# Patient Record
Sex: MALE | Race: WHITE | NOT HISPANIC OR LATINO | Employment: OTHER | URBAN - METROPOLITAN AREA
[De-identification: names, ages, dates, MRNs, and addresses within clinical notes are randomized per-mention and may not be internally consistent; named-entity substitution may affect disease eponyms.]

---

## 2018-01-05 PROBLEM — K83.1 OBSTRUCTIVE JAUNDICE: Status: ACTIVE | Noted: 2018-01-05

## 2018-01-05 NOTE — PLAN OF CARE
[facility] Hospital Medicine Transfer Acceptance Note    Date of Acceptance: 1/5  [facility] Nurse: Marisa Guadarrama  Transferring Provider and Specialty: Evangelista Izaguirre  Transferring Facility/Hospital: [facility]  Reason for transfer to [facility]: EUS/ERCP  Code Status:     Accepting Physician:  Report from Transferring Physician or Mid-Level Provider/Hospital Course:  To do list upon patient arrival:      Please call extension [phone] upon patient arrival to floor for Hospital Medicine admit team assignment and for additional admit orders. If patient is coming from another [facility] facility please also call [phone] to inform the admit team/office that patient has arrived from the [facility] facility to the floor so patient can be evaluated.

## 2018-01-05 NOTE — PLAN OF CARE
Outside Transfer Acceptance Note    Transferring Physician or Mid Level Provider/Speciality: Dr Evangelista Izaguirre    Accepting Physician: Malinda Alejandra     Date of Acceptance: 01/05/2018     Code Status: Full    Transferring Facility/Hospital: [facility]     Reason for Transfer to [facility]: Obstructive Jaundice. Needs EUS/ ERCP    Report from Transferring Physician or Mid-Level provider/Hospital course: 87 M with myelodysplastic syndrome, aortic aneurysm, HTN, renal artery stenosis, CAD s/p stent placement, and gout. He was recently treated for endocarditis (unknown culture) who completed a 6 week course of antibiotics (unknown which antibiotic).     On 12/5, pt presented with abd pain and elevated Bilirubin 8 and elevated LFTs.  Thought to be possible side effect of the antibiotics and was sent home. He represented on the 19th and bilirubin has continued to trend up to 21. ERCP showed biliary stenosis that was not amenable to stenting.     Discussed with GI who recommend transfer here for ERCP / EUS    To do list upon patient arrival: Consult [facility] / GI    Please call extension [phone] upon patient arrival to floor for Hospital Medicine admit team assignment and for additional admit orders. If patient is coming from another [facility] facility please also call [phone] to inform the admit team/office that patient has arrived from the [facility] facility to the floor so patient can be evaluated.

## 2018-01-06 PROBLEM — I25.10 CAD (CORONARY ARTERY DISEASE): Status: ACTIVE | Noted: 2018-01-06

## 2018-01-06 PROBLEM — M10.9 GOUT: Chronic | Status: ACTIVE | Noted: 2018-01-06

## 2018-01-06 PROBLEM — D46.9 MDS (MYELODYSPLASTIC SYNDROME): Status: ACTIVE | Noted: 2018-01-06

## 2018-01-06 PROBLEM — N40.1 BENIGN PROSTATIC HYPERPLASIA WITH LOWER URINARY TRACT SYMPTOMS: Chronic | Status: ACTIVE | Noted: 2018-01-06

## 2018-01-06 PROBLEM — I71.9 AORTIC ANEURYSM: Status: ACTIVE | Noted: 2018-01-06

## 2018-01-06 PROBLEM — K22.4 ESOPHAGEAL DYSMOTILITIES: Status: ACTIVE | Noted: 2018-01-06

## 2018-01-06 PROBLEM — I10 ESSENTIAL HYPERTENSION: Status: ACTIVE | Noted: 2018-01-06

## 2018-01-06 RX ORDER — GLUCAGON 1 MG
1 KIT INJECTION
Status: DISCONTINUED | OUTPATIENT
Start: 2018-01-07 | End: 2018-01-12 | Stop reason: HOSPADM

## 2018-01-06 RX ORDER — IBUPROFEN 200 MG
24 TABLET ORAL
Status: DISCONTINUED | OUTPATIENT
Start: 2018-01-07 | End: 2018-01-12 | Stop reason: HOSPADM

## 2018-01-06 RX ORDER — IBUPROFEN 200 MG
16 TABLET ORAL
Status: DISCONTINUED | OUTPATIENT
Start: 2018-01-07 | End: 2018-01-12 | Stop reason: HOSPADM

## 2018-01-06 RX ORDER — INSULIN ASPART 100 [IU]/ML
0-5 INJECTION, SOLUTION INTRAVENOUS; SUBCUTANEOUS
Status: DISCONTINUED | OUTPATIENT
Start: 2018-01-07 | End: 2018-01-12 | Stop reason: HOSPADM

## 2018-01-06 RX ORDER — SODIUM CHLORIDE 0.9 % (FLUSH) 0.9 %
5 SYRINGE (ML) INJECTION
Status: DISCONTINUED | OUTPATIENT
Start: 2018-01-07 | End: 2018-01-12 | Stop reason: HOSPADM

## 2018-01-07 ENCOUNTER — HOSPITAL ENCOUNTER (INPATIENT)
Facility: HOSPITAL | Age: 83
LOS: 5 days | Discharge: HOME HEALTH | DRG: 374 | End: 2018-01-12
Attending: HOSPITALIST | Admitting: HOSPITALIST
Payer: MEDICARE

## 2018-01-07 DIAGNOSIS — Z95.0 PACEMAKER: ICD-10-CM

## 2018-01-07 DIAGNOSIS — K31.5 DUODENAL STRICTURE: Primary | ICD-10-CM

## 2018-01-07 DIAGNOSIS — B95.8 BACTERIAL INFECTION DUE TO STAPHYLOCOCCUS: ICD-10-CM

## 2018-01-07 DIAGNOSIS — K92.0 HEMATEMESIS WITH NAUSEA: ICD-10-CM

## 2018-01-07 DIAGNOSIS — K83.1 OBSTRUCTIVE JAUNDICE: ICD-10-CM

## 2018-01-07 DIAGNOSIS — D46.9 MDS (MYELODYSPLASTIC SYNDROME): ICD-10-CM

## 2018-01-07 PROBLEM — R78.81 BACTEREMIA DUE TO STAPHYLOCOCCUS: Status: ACTIVE | Noted: 2018-01-07

## 2018-01-07 LAB
ALBUMIN SERPL BCP-MCNC: 2.2 G/DL
ALBUMIN SERPL BCP-MCNC: 2.3 G/DL
ALP SERPL-CCNC: 405 U/L
ALP SERPL-CCNC: 450 U/L
ALT SERPL W/O P-5'-P-CCNC: 57 U/L
ALT SERPL W/O P-5'-P-CCNC: 66 U/L
ANION GAP SERPL CALC-SCNC: 10 MMOL/L
ANION GAP SERPL CALC-SCNC: 8 MMOL/L
ANISOCYTOSIS BLD QL SMEAR: SLIGHT
AST SERPL-CCNC: 69 U/L
AST SERPL-CCNC: 80 U/L
BASO STIPL BLD QL SMEAR: ABNORMAL
BASOPHILS # BLD AUTO: 0 K/UL
BASOPHILS # BLD AUTO: 0.02 K/UL
BASOPHILS NFR BLD: 0 %
BASOPHILS NFR BLD: 0.5 %
BILIRUB DIRECT SERPL-MCNC: >14 MG/DL
BILIRUB SERPL-MCNC: 27.7 MG/DL
BILIRUB SERPL-MCNC: 30.8 MG/DL
BILIRUB UR QL STRIP: ABNORMAL
BUN SERPL-MCNC: 19 MG/DL
BUN SERPL-MCNC: 20 MG/DL
CALCIUM SERPL-MCNC: 8.8 MG/DL
CALCIUM SERPL-MCNC: 9 MG/DL
CANCER AG19-9 SERPL-ACNC: ABNORMAL U/ML
CHLORIDE SERPL-SCNC: 108 MMOL/L
CHLORIDE SERPL-SCNC: 109 MMOL/L
CK SERPL-CCNC: 24 U/L
CLARITY UR REFRACT.AUTO: ABNORMAL
CO2 SERPL-SCNC: 23 MMOL/L
CO2 SERPL-SCNC: 24 MMOL/L
COLOR UR AUTO: ABNORMAL
CREAT SERPL-MCNC: 1.4 MG/DL
CREAT SERPL-MCNC: 1.5 MG/DL
DIFFERENTIAL METHOD: ABNORMAL
DIFFERENTIAL METHOD: ABNORMAL
EOSINOPHIL # BLD AUTO: 0 K/UL
EOSINOPHIL # BLD AUTO: 0 K/UL
EOSINOPHIL NFR BLD: 0.8 %
EOSINOPHIL NFR BLD: 0.9 %
ERYTHROCYTE [DISTWIDTH] IN BLOOD BY AUTOMATED COUNT: 22.5 %
ERYTHROCYTE [DISTWIDTH] IN BLOOD BY AUTOMATED COUNT: 22.5 %
EST. GFR  (AFRICAN AMERICAN): 47.7 ML/MIN/1.73 M^2
EST. GFR  (AFRICAN AMERICAN): 51.9 ML/MIN/1.73 M^2
EST. GFR  (NON AFRICAN AMERICAN): 41.3 ML/MIN/1.73 M^2
EST. GFR  (NON AFRICAN AMERICAN): 44.9 ML/MIN/1.73 M^2
ESTIMATED AVG GLUCOSE: ABNORMAL MG/DL
GGT SERPL-CCNC: 625 U/L
GLUCOSE SERPL-MCNC: 115 MG/DL
GLUCOSE SERPL-MCNC: 95 MG/DL
GLUCOSE UR QL STRIP: NEGATIVE
HBA1C MFR BLD HPLC: <4 %
HCT VFR BLD AUTO: 22.4 %
HCT VFR BLD AUTO: 24.4 %
HGB BLD-MCNC: 7.3 G/DL
HGB BLD-MCNC: 8 G/DL
HGB UR QL STRIP: NEGATIVE
HYPOCHROMIA BLD QL SMEAR: ABNORMAL
IMM GRANULOCYTES # BLD AUTO: 0.13 K/UL
IMM GRANULOCYTES # BLD AUTO: 0.14 K/UL
IMM GRANULOCYTES NFR BLD AUTO: 3.8 %
IMM GRANULOCYTES NFR BLD AUTO: 4 %
INR PPP: 1.2
KETONES UR QL STRIP: NEGATIVE
LEUKOCYTE ESTERASE UR QL STRIP: NEGATIVE
LIPASE SERPL-CCNC: 7 U/L
LYMPHOCYTES # BLD AUTO: 0.9 K/UL
LYMPHOCYTES # BLD AUTO: 1 K/UL
LYMPHOCYTES NFR BLD: 23.4 %
LYMPHOCYTES NFR BLD: 29.1 %
MAGNESIUM SERPL-MCNC: 1.6 MG/DL
MCH RBC QN AUTO: 31.6 PG
MCH RBC QN AUTO: 31.6 PG
MCHC RBC AUTO-ENTMCNC: 32.6 G/DL
MCHC RBC AUTO-ENTMCNC: 32.8 G/DL
MCV RBC AUTO: 96 FL
MCV RBC AUTO: 97 FL
MONOCYTES # BLD AUTO: 0.4 K/UL
MONOCYTES # BLD AUTO: 0.4 K/UL
MONOCYTES NFR BLD: 10.5 %
MONOCYTES NFR BLD: 11.9 %
NEUTROPHILS # BLD AUTO: 1.8 K/UL
NEUTROPHILS # BLD AUTO: 2.3 K/UL
NEUTROPHILS NFR BLD: 54.1 %
NEUTROPHILS NFR BLD: 61 %
NITRITE UR QL STRIP: NEGATIVE
NRBC BLD-RTO: 0 /100 WBC
NRBC BLD-RTO: 0 /100 WBC
PH UR STRIP: 5 [PH] (ref 5–8)
PHOSPHATE SERPL-MCNC: 2 MG/DL
PHOSPHATE SERPL-MCNC: 2 MG/DL
PLATELET # BLD AUTO: 102 K/UL
PLATELET # BLD AUTO: 117 K/UL
PLATELET BLD QL SMEAR: ABNORMAL
PMV BLD AUTO: 11.1 FL
PMV BLD AUTO: 11.4 FL
POLYCHROMASIA BLD QL SMEAR: ABNORMAL
POTASSIUM SERPL-SCNC: 3.4 MMOL/L
POTASSIUM SERPL-SCNC: 3.5 MMOL/L
PROT SERPL-MCNC: 5.5 G/DL
PROT SERPL-MCNC: 5.9 G/DL
PROT UR QL STRIP: NEGATIVE
PROTHROMBIN TIME: 12.4 SEC
RBC # BLD AUTO: 2.31 M/UL
RBC # BLD AUTO: 2.53 M/UL
SODIUM SERPL-SCNC: 141 MMOL/L
SODIUM SERPL-SCNC: 141 MMOL/L
SP GR UR STRIP: 1.02 (ref 1–1.03)
URN SPEC COLLECT METH UR: ABNORMAL
UROBILINOGEN UR STRIP-ACNC: 1 EU/DL
WBC # BLD AUTO: 3.27 K/UL
WBC # BLD AUTO: 3.72 K/UL

## 2018-01-07 PROCEDURE — 83690 ASSAY OF LIPASE: CPT

## 2018-01-07 PROCEDURE — 63600175 PHARM REV CODE 636 W HCPCS: Performed by: HOSPITALIST

## 2018-01-07 PROCEDURE — 99223 1ST HOSP IP/OBS HIGH 75: CPT | Mod: GC,,, | Performed by: INTERNAL MEDICINE

## 2018-01-07 PROCEDURE — 84100 ASSAY OF PHOSPHORUS: CPT | Mod: 91

## 2018-01-07 PROCEDURE — 83036 HEMOGLOBIN GLYCOSYLATED A1C: CPT

## 2018-01-07 PROCEDURE — 87040 BLOOD CULTURE FOR BACTERIA: CPT | Mod: 59

## 2018-01-07 PROCEDURE — 85025 COMPLETE CBC W/AUTO DIFF WBC: CPT | Mod: 91

## 2018-01-07 PROCEDURE — 99223 1ST HOSP IP/OBS HIGH 75: CPT | Mod: ,,, | Performed by: INTERNAL MEDICINE

## 2018-01-07 PROCEDURE — 92610 EVALUATE SWALLOWING FUNCTION: CPT

## 2018-01-07 PROCEDURE — 99222 1ST HOSP IP/OBS MODERATE 55: CPT | Mod: GC,,, | Performed by: INTERNAL MEDICINE

## 2018-01-07 PROCEDURE — 80053 COMPREHEN METABOLIC PANEL: CPT | Mod: 91

## 2018-01-07 PROCEDURE — 20600001 HC STEP DOWN PRIVATE ROOM

## 2018-01-07 PROCEDURE — 86301 IMMUNOASSAY TUMOR CA 19-9: CPT

## 2018-01-07 PROCEDURE — 81003 URINALYSIS AUTO W/O SCOPE: CPT

## 2018-01-07 PROCEDURE — 36415 COLL VENOUS BLD VENIPUNCTURE: CPT

## 2018-01-07 PROCEDURE — 85610 PROTHROMBIN TIME: CPT

## 2018-01-07 PROCEDURE — 82248 BILIRUBIN DIRECT: CPT

## 2018-01-07 PROCEDURE — 84100 ASSAY OF PHOSPHORUS: CPT

## 2018-01-07 PROCEDURE — 82977 ASSAY OF GGT: CPT

## 2018-01-07 PROCEDURE — 99223 1ST HOSP IP/OBS HIGH 75: CPT | Mod: AI,GC,, | Performed by: HOSPITALIST

## 2018-01-07 PROCEDURE — 93010 ELECTROCARDIOGRAM REPORT: CPT | Mod: ,,, | Performed by: INTERNAL MEDICINE

## 2018-01-07 PROCEDURE — 80053 COMPREHEN METABOLIC PANEL: CPT

## 2018-01-07 PROCEDURE — 25000003 PHARM REV CODE 250: Performed by: HOSPITALIST

## 2018-01-07 PROCEDURE — 63600175 PHARM REV CODE 636 W HCPCS: Mod: JG | Performed by: STUDENT IN AN ORGANIZED HEALTH CARE EDUCATION/TRAINING PROGRAM

## 2018-01-07 PROCEDURE — 25000003 PHARM REV CODE 250: Performed by: STUDENT IN AN ORGANIZED HEALTH CARE EDUCATION/TRAINING PROGRAM

## 2018-01-07 PROCEDURE — 83735 ASSAY OF MAGNESIUM: CPT

## 2018-01-07 PROCEDURE — 82550 ASSAY OF CK (CPK): CPT

## 2018-01-07 RX ORDER — TAMSULOSIN HYDROCHLORIDE 0.4 MG/1
0.4 CAPSULE ORAL DAILY
Status: DISCONTINUED | OUTPATIENT
Start: 2018-01-07 | End: 2018-01-08

## 2018-01-07 RX ORDER — MAGNESIUM SULFATE HEPTAHYDRATE 40 MG/ML
2 INJECTION, SOLUTION INTRAVENOUS ONCE
Status: COMPLETED | OUTPATIENT
Start: 2018-01-07 | End: 2018-01-07

## 2018-01-07 RX ORDER — SOTALOL HYDROCHLORIDE 80 MG/1
40 TABLET ORAL 2 TIMES DAILY
Status: DISCONTINUED | OUTPATIENT
Start: 2018-01-07 | End: 2018-01-08

## 2018-01-07 RX ORDER — PRAVASTATIN SODIUM 20 MG/1
20 TABLET ORAL DAILY
Status: DISCONTINUED | OUTPATIENT
Start: 2018-01-07 | End: 2018-01-07

## 2018-01-07 RX ORDER — AMLODIPINE BESYLATE 5 MG/1
5 TABLET ORAL DAILY
Status: DISCONTINUED | OUTPATIENT
Start: 2018-01-07 | End: 2018-01-12 | Stop reason: HOSPADM

## 2018-01-07 RX ORDER — MONTELUKAST SODIUM 10 MG/1
10 TABLET ORAL DAILY
Status: DISCONTINUED | OUTPATIENT
Start: 2018-01-07 | End: 2018-01-12 | Stop reason: HOSPADM

## 2018-01-07 RX ORDER — RAMELTEON 8 MG/1
8 TABLET ORAL NIGHTLY PRN
Status: DISCONTINUED | OUTPATIENT
Start: 2018-01-07 | End: 2018-01-12 | Stop reason: HOSPADM

## 2018-01-07 RX ORDER — POLYETHYLENE GLYCOL 3350 17 G/17G
17 POWDER, FOR SOLUTION ORAL DAILY PRN
Status: DISCONTINUED | OUTPATIENT
Start: 2018-01-07 | End: 2018-01-07

## 2018-01-07 RX ORDER — POTASSIUM CHLORIDE 750 MG/1
40 CAPSULE, EXTENDED RELEASE ORAL ONCE
Status: DISCONTINUED | OUTPATIENT
Start: 2018-01-07 | End: 2018-01-07

## 2018-01-07 RX ORDER — NAPROXEN SODIUM 220 MG/1
81 TABLET, FILM COATED ORAL DAILY
Status: ON HOLD | COMMUNITY
End: 2018-01-11

## 2018-01-07 RX ORDER — POTASSIUM CHLORIDE 20 MEQ/1
20 TABLET, EXTENDED RELEASE ORAL ONCE
Status: COMPLETED | OUTPATIENT
Start: 2018-01-07 | End: 2018-01-07

## 2018-01-07 RX ORDER — POLYETHYLENE GLYCOL 3350 17 G/17G
17 POWDER, FOR SOLUTION ORAL DAILY
Status: DISCONTINUED | OUTPATIENT
Start: 2018-01-07 | End: 2018-01-12 | Stop reason: HOSPADM

## 2018-01-07 RX ORDER — ASPIRIN 81 MG/1
81 TABLET ORAL DAILY
Status: DISCONTINUED | OUTPATIENT
Start: 2018-01-07 | End: 2018-01-09

## 2018-01-07 RX ORDER — PANTOPRAZOLE SODIUM 40 MG/1
40 TABLET, DELAYED RELEASE ORAL DAILY
Status: DISCONTINUED | OUTPATIENT
Start: 2018-01-07 | End: 2018-01-08

## 2018-01-07 RX ORDER — DIPHENHYDRAMINE HCL 25 MG
25 CAPSULE ORAL EVERY 6 HOURS PRN
Status: DISCONTINUED | OUTPATIENT
Start: 2018-01-07 | End: 2018-01-12 | Stop reason: HOSPADM

## 2018-01-07 RX ADMIN — POLYETHYLENE GLYCOL 3350 17 G: 17 POWDER, FOR SOLUTION ORAL at 10:01

## 2018-01-07 RX ADMIN — POTASSIUM CHLORIDE 20 MEQ: 1500 TABLET, EXTENDED RELEASE ORAL at 03:01

## 2018-01-07 RX ADMIN — TAMSULOSIN HYDROCHLORIDE 0.4 MG: 0.4 CAPSULE ORAL at 10:01

## 2018-01-07 RX ADMIN — DIPHENHYDRAMINE HYDROCHLORIDE 25 MG: 25 CAPSULE ORAL at 03:01

## 2018-01-07 RX ADMIN — DAPTOMYCIN 500 MG: 500 INJECTION, POWDER, LYOPHILIZED, FOR SOLUTION INTRAVENOUS at 02:01

## 2018-01-07 RX ADMIN — AMLODIPINE BESYLATE 5 MG: 5 TABLET ORAL at 10:01

## 2018-01-07 RX ADMIN — MONTELUKAST SODIUM 10 MG: 10 TABLET, FILM COATED ORAL at 10:01

## 2018-01-07 RX ADMIN — ASPIRIN 81 MG: 81 TABLET, COATED ORAL at 10:01

## 2018-01-07 RX ADMIN — PANTOPRAZOLE SODIUM 40 MG: 40 TABLET, DELAYED RELEASE ORAL at 10:01

## 2018-01-07 RX ADMIN — SOTALOL HYDROCHLORIDE 40 MG: 80 TABLET ORAL at 09:01

## 2018-01-07 RX ADMIN — MAGNESIUM SULFATE IN WATER 2 G: 40 INJECTION, SOLUTION INTRAVENOUS at 10:01

## 2018-01-07 NOTE — HPI
87 year old male with a history of HTN and MDS on who [facility] is being consulted for obstructive jaundice.    History provided by patient and family.  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  Admitted now after being found to have increasing bili. He had a Ct, ERCP, and attempt PTC placement all reported below.  Now at [facility] he denies any nausea, emesis, or abdominal pain. He is tolerating liquids without an issue.    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

## 2018-01-07 NOTE — CONSULTS
[facility]  Hematology/Oncology  Consult Note    Patient Name: Hal Sultana  MRN: [id number]  Admission Date: 1/7/2018  Hospital Length of Stay: 0 days  Code Status: Full Code   Attending Provider: Levar Rosado MD  Consulting Provider: Lashawn Hoffmann MD  Primary Care Physician: Kaycee Ortega MD  Principal Problem:Obstructive jaundice    Inpatient consult to Hematology/Oncology  Consult performed by: LASHAWN HOFFMANN  Consult ordered by: KAY MCNULTY IV        Subjective:     HPI:  Hal Sultana is a  87 year old gentleman with MDS on Vidaza, aortic aneurysm, stable CAD, esophageal dysmotility, gout, hypertension.  He is a transfer from [facility] [place]. He was accepted for transfer by Dr. Sanchez from [facility] for possible intervention for obstructive jaundice.  He was noted in outpatient workup with infectious disease to have significant jaundice with T. Bili of 19.   He has overall had 2 admissions, the first of which determined to have bacteremia, of which he is currently being treated.  In addition, he had an MRCP performed, which was inconclusive 2/2 motion artifact.  EGD also performed, where there was no evidence of malignancy.  He went to ID follow up with noted worsening hyperbilirubinemia, prompting further work-up.  CT abdomen on 1/3/2018 showed interval development of intrahepatic biliary ductal dilation with continued dilatation of CBD, no pancreas lesion. On 1/3/2018 he underwent ERCP which showed tight duodenal stricture in the post bulbar area with inability to get the scope to pass the stricture with concern this could be a malignant duodenal lesion. On 1/5/2018, he underwent percutaneous biliary drain despite multiple phases, and possibly due to intrahepatic duct dilation made the procedure difficult.     He was diagnosed with MDS 18 months ago, primarily when his cardiologist noted his anemia.  He then seen Dr. Carrasco in [facility] for further evaluation,  where he notes to have had a bone marrow biopsy, confirming MDS.  He was then started on Vidaza (5 days every month).  He would note that he would develop fevers every 2 weeks out with a maximum temperature of 101, of which would last for approximately 5 days; the fevers were thought to have been 2/2 Vidaza.  He also notes weight loss starting with therapy, where he initially weighed 175 lbs, and now has dropped to 140 lbs.  Interestingly, most of his weight loss and his associated generalized fatigue began approximately 1 month ago, in addition to his jaundice.  He is a former smoker, quit 40 years ago, 1 PPD.  Worked as an .  Prior to hospitalization, he was able to complete all his ADLs.  Currently spends > 50 % of time out of bed.      Oncology Treatment Plan:   [No treatment plan]    Medications:  Continuous Infusions:  Scheduled Meds:   amLODIPine  5 mg Oral Daily    aspirin  81 mg Oral Daily    DAPTOmycin (CUBICIN)  IV  500 mg Intravenous Q24H    magnesium sulfate IVPB  2 g Intravenous Once    montelukast  10 mg Oral Daily    pantoprazole  40 mg Oral Daily    polyethylene glycol  17 g Oral Daily    potassium chloride  40 mEq Oral Once    pravastatin  20 mg Oral Daily    sotalol  40 mg Oral BID    tamsulosin  0.4 mg Oral Daily     PRN Meds:dextrose 50%, dextrose 50%, diphenhydrAMINE, diphenhydrAMINE-zinc acetate 1-0.1%, glucagon (human recombinant), glucose, glucose, insulin aspart, sodium chloride 0.9%     Review of patient's allergies indicates:   Allergen Reactions    Avelox [moxifloxacin]     Darvocet a500 [propoxyphene n-acetaminophen]     Ibudone [hydrocodone-ibuprofen]     Levaquin [levofloxacin]         Past Medical History:   Diagnosis Date    AAA (abdominal aortic aneurysm)     BPH (benign prostatic hyperplasia)     Duodenal stricture     Hypertension      Past Surgical History:   Procedure Laterality Date    ABDOMINAL AORTIC ANEURYSM REPAIR      CORONARY  ARTERY BYPASS GRAFT      ERCP      ESOPHAGOGASTRODUODENOSCOPY  12/13/2017     Family History     Problem Relation (Age of Onset)    No Known Problems Mother, Father        Social History Main Topics    Smoking status: Current Every Day Smoker     Types: Cigarettes    Smokeless tobacco: Not on file    Alcohol use No    Drug use: No    Sexual activity: Not Currently       Review of Systems   Constitutional: Positive for unexpected weight change. Negative for activity change, appetite change, chills, diaphoresis, fatigue and fever.   HENT: Negative for dental problem and drooling.    Respiratory: Negative for cough, choking, shortness of breath, wheezing and stridor.    Cardiovascular: Negative for chest pain, palpitations and leg swelling.   Gastrointestinal: Negative for abdominal distention, abdominal pain, constipation and diarrhea.   Genitourinary: Negative for difficulty urinating, dysuria and hematuria.   Musculoskeletal: Negative for arthralgias and back pain.   Neurological: Negative for weakness.   Psychiatric/Behavioral: Negative for agitation and behavioral problems.     Objective:     Vital Signs (Most Recent):  Temp: 98.2 °F (36.8 °C) (01/07/18 0752)  Pulse: 74 (01/07/18 0752)  Resp: 18 (01/07/18 0752)  BP: 134/65 (01/07/18 0752)  SpO2: 100 % (01/07/18 0752) Vital Signs (24h Range):  Temp:  [97.6 °F (36.4 °C)-98.2 °F (36.8 °C)] 98.2 °F (36.8 °C)  Pulse:  [70-75] 74  Resp:  [18-20] 18  SpO2:  [97 %-100 %] 100 %  BP: (108-135)/(53-65) 134/65     Weight: 67.9 kg (149 lb 11.1 oz)  Body mass index is 22.11 kg/m².  Body surface area is 1.82 meters squared.      Intake/Output Summary (Last 24 hours) at 01/07/18 0950  Last data filed at 01/07/18 0426   Gross per 24 hour   Intake              170 ml   Output              100 ml   Net               70 ml       Physical Exam   Constitutional: He is oriented to person, place, and time. He appears well-developed and well-nourished. No distress.   Jaundice     HENT:   Head: Normocephalic and atraumatic.   Mouth/Throat: No oropharyngeal exudate.   Eyes: Scleral icterus is present.   Cardiovascular: Normal rate and regular rhythm.  Exam reveals no friction rub.    3/6 systolic murmur appreciated, radiating to R carotid.   Pulmonary/Chest: Effort normal and breath sounds normal. No respiratory distress. He has no wheezes.   Abdominal: Soft. Bowel sounds are normal. He exhibits distension. There is no tenderness. There is no guarding.   Musculoskeletal: Normal range of motion. He exhibits no edema or deformity.   Neurological: He is alert and oriented to person, place, and time. No cranial nerve deficit. Coordination normal.   Skin: He is not diaphoretic. There is pallor.   Vitals reviewed.      Significant Labs:     Recent Results (from the past 24 hour(s))   Hemoglobin A1c    Collection Time: 01/07/18 12:53 AM   Result Value Ref Range    Hemoglobin A1C <4.0 (A) 4.0 - 5.6 %    Estimated Avg Glucose Unable to calculate 68 - 131 mg/dL   Comprehensive Metabolic Panel (CMP)    Collection Time: 01/07/18 12:53 AM   Result Value Ref Range    Sodium 141 136 - 145 mmol/L    Potassium 3.5 3.5 - 5.1 mmol/L    Chloride 108 95 - 110 mmol/L    CO2 23 23 - 29 mmol/L    Glucose 115 (H) 70 - 110 mg/dL    BUN, Bld 19 8 - 23 mg/dL    Creatinine 1.5 (H) 0.5 - 1.4 mg/dL    Calcium 9.0 8.7 - 10.5 mg/dL    Total Protein 5.9 (L) 6.0 - 8.4 g/dL    Albumin 2.3 (L) 3.5 - 5.2 g/dL    Total Bilirubin 30.8 (H) 0.1 - 1.0 mg/dL    Alkaline Phosphatase 450 (H) 55 - 135 U/L    AST 80 (H) 10 - 40 U/L    ALT 66 (H) 10 - 44 U/L    Anion Gap 10 8 - 16 mmol/L    eGFR if African American 47.7 (A) >60 mL/min/1.73 m^2    eGFR if non  41.3 (A) >60 mL/min/1.73 m^2   Phosphorus    Collection Time: 01/07/18 12:53 AM   Result Value Ref Range    Phosphorus 2.0 (L) 2.7 - 4.5 mg/dL   Bilirubin, direct    Collection Time: 01/07/18 12:53 AM   Result Value Ref Range    Bilirubin, Direct >14.0 (H) 0.1 - 0.3  mg/dL   Lipase    Collection Time: 01/07/18 12:53 AM   Result Value Ref Range    Lipase 7 4 - 60 U/L   Gamma GT    Collection Time: 01/07/18 12:53 AM   Result Value Ref Range     (H) 8 - 55 U/L   CBC with Automated Differential    Collection Time: 01/07/18 12:53 AM   Result Value Ref Range    WBC 3.72 (L) 3.90 - 12.70 K/uL    RBC 2.53 (L) 4.60 - 6.20 M/uL    Hemoglobin 8.0 (L) 14.0 - 18.0 g/dL    Hematocrit 24.4 (L) 40.0 - 54.0 %    MCV 96 82 - 98 fL    MCH 31.6 (H) 27.0 - 31.0 pg    MCHC 32.8 32.0 - 36.0 g/dL    RDW 22.5 (H) 11.5 - 14.5 %    Platelets 117 (L) 150 - 350 K/uL    MPV 11.1 9.2 - 12.9 fL    Immature Granulocytes 3.8 (H) 0.0 - 0.5 %    Gran # 2.3 1.8 - 7.7 K/uL    Immature Grans (Abs) 0.14 (H) 0.00 - 0.04 K/uL    Lymph # 0.9 (L) 1.0 - 4.8 K/uL    Mono # 0.4 0.3 - 1.0 K/uL    Eos # 0.0 0.0 - 0.5 K/uL    Baso # 0.02 0.00 - 0.20 K/uL    nRBC 0 0 /100 WBC    Gran% 61.0 38.0 - 73.0 %    Lymph% 23.4 18.0 - 48.0 %    Mono% 10.5 4.0 - 15.0 %    Eosinophil% 0.8 0.0 - 8.0 %    Basophil% 0.5 0.0 - 1.9 %    Platelet Estimate Decreased (A)     Aniso Slight     Poly Occasional     Hypo Occasional     Basophilic Stippling Occasional     Differential Method Automated    Cancer antigen 19-9    Collection Time: 01/07/18 12:53 AM   Result Value Ref Range    CA 19-9 >87749.0 (H) 2.0 - 40.0 U/mL   CK    Collection Time: 01/07/18 12:53 AM   Result Value Ref Range    CPK 24 20 - 200 U/L   Comprehensive Metabolic Panel (CMP)    Collection Time: 01/07/18  4:21 AM   Result Value Ref Range    Sodium 141 136 - 145 mmol/L    Potassium 3.4 (L) 3.5 - 5.1 mmol/L    Chloride 109 95 - 110 mmol/L    CO2 24 23 - 29 mmol/L    Glucose 95 70 - 110 mg/dL    BUN, Bld 20 8 - 23 mg/dL    Creatinine 1.4 0.5 - 1.4 mg/dL    Calcium 8.8 8.7 - 10.5 mg/dL    Total Protein 5.5 (L) 6.0 - 8.4 g/dL    Albumin 2.2 (L) 3.5 - 5.2 g/dL    Total Bilirubin 27.7 (H) 0.1 - 1.0 mg/dL    Alkaline Phosphatase 405 (H) 55 - 135 U/L    AST 69 (H) 10 - 40 U/L     ALT 57 (H) 10 - 44 U/L    Anion Gap 8 8 - 16 mmol/L    eGFR if African American 51.9 (A) >60 mL/min/1.73 m^2    eGFR if non African American 44.9 (A) >60 mL/min/1.73 m^2   Magnesium    Collection Time: 01/07/18  4:21 AM   Result Value Ref Range    Magnesium 1.6 1.6 - 2.6 mg/dL   Phosphorus    Collection Time: 01/07/18  4:21 AM   Result Value Ref Range    Phosphorus 2.0 (L) 2.7 - 4.5 mg/dL   CBC with Automated Differential    Collection Time: 01/07/18  4:21 AM   Result Value Ref Range    WBC 3.27 (L) 3.90 - 12.70 K/uL    RBC 2.31 (L) 4.60 - 6.20 M/uL    Hemoglobin 7.3 (L) 14.0 - 18.0 g/dL    Hematocrit 22.4 (L) 40.0 - 54.0 %    MCV 97 82 - 98 fL    MCH 31.6 (H) 27.0 - 31.0 pg    MCHC 32.6 32.0 - 36.0 g/dL    RDW 22.5 (H) 11.5 - 14.5 %    Platelets 102 (L) 150 - 350 K/uL    MPV 11.4 9.2 - 12.9 fL    Immature Granulocytes 4.0 (H) 0.0 - 0.5 %    Gran # 1.8 1.8 - 7.7 K/uL    Immature Grans (Abs) 0.13 (H) 0.00 - 0.04 K/uL    Lymph # 1.0 1.0 - 4.8 K/uL    Mono # 0.4 0.3 - 1.0 K/uL    Eos # 0.0 0.0 - 0.5 K/uL    Baso # 0.00 0.00 - 0.20 K/uL    nRBC 0 0 /100 WBC    Gran% 54.1 38.0 - 73.0 %    Lymph% 29.1 18.0 - 48.0 %    Mono% 11.9 4.0 - 15.0 %    Eosinophil% 0.9 0.0 - 8.0 %    Basophil% 0.0 0.0 - 1.9 %    Differential Method Automated    Urinalysis    Collection Time: 01/07/18  4:26 AM   Result Value Ref Range    Specimen UA Urine, Clean Catch     Color, UA Samira Yellow, Straw, Samira    Appearance, UA Hazy (A) Clear    pH, UA 5.0 5.0 - 8.0    Specific Gravity, UA 1.025 1.005 - 1.030    Protein, UA Negative Negative    Glucose, UA Negative Negative    Ketones, UA Negative Negative    Bilirubin (UA) 2+ (A) Negative    Occult Blood UA Negative Negative    Nitrite, UA Negative Negative    Urobilinogen, UA 1.0 <2.0 EU/dL    Leukocytes, UA Negative Negative         Diagnostic Results:  I have reviewed all pertinent imaging results/findings within the past 24 hours.     CXR 1/7/2018    Right-sided PICC line with tip projecting  over the SVC.  Pacemaker with cardiac leads projecting over the right atrial appendage and right ventricle.  Surgical clips project over the cardiac silhouette in keeping with previous CABG.  Additional surgical clips project over the right apical.     Mediastinal structures are midline the cardiac silhouette is normal in size.  Lung volumes are symmetric.  No focal consolidation.  No pneumothorax or pleural effusions.  No free air beneath the diaphragm.  Degenerative changes of the spine and shoulders noted.  There is scattered gas within slightly prominent loops of colon within the upper abdomen.    Assessment/Plan:     * Obstructive jaundice    Patient transferred for [facility] evaluation for biliary stricture concerning for malignancy.  Overall, patient with high likelihood for malignancy given symptoms.  Overall, agree with [facility] consult for biopsy.  Please contact Hematology/Oncology consult service after pathology has been obtained.  Uncertain at this time if patient would be candidate for further treatment, given advanced age.  ECOG score of 2.  Would also recommend:    · Ca 19-9    For further evaluation.         MDS (myelodysplastic syndrome)    Hal Sultana is a 87 y.o. gentleman with recently diagnosed MDS (18 months ago) who presents for evaluation of painless obstructive jaundice.  Overall, while we do not have work-up, we do know the patient is on Vidaza for treatment.  As of now, we would agree to continue holding medication in setting of additional malignancy work-up.  Transfuse as needed per normal guidelines.            Staff attestation to follow.    Thank you for your consult. I will follow-up with patient. Please contact us if you have any additional questions.    Junior Hoffmann MD  Hematology/Oncology  [facility]    Attending Addendum:  The patient was seen, examined, and discussed on rounds with the team.  I agree with the assessment and plan as outlined for Hal Sultana.   We'll await findings of EUS and ERCP.  We'll continue to hold dacogen.  Transfuse if clinically indicated.    Leon Haile DO, FACP  Hematology & Oncology  [facility] [place] [address]  ph. [phone]  Fax. [phone]

## 2018-01-07 NOTE — SUBJECTIVE & OBJECTIVE
Past Medical History:   Diagnosis Date    AAA (abdominal aortic aneurysm)     BPH (benign prostatic hyperplasia)     Duodenal stricture     Hypertension        Past Surgical History:   Procedure Laterality Date    ABDOMINAL AORTIC ANEURYSM REPAIR      CORONARY ARTERY BYPASS GRAFT      ERCP      ESOPHAGOGASTRODUODENOSCOPY  12/13/2017       Review of patient's allergies indicates:   Allergen Reactions    Avelox [moxifloxacin]     Darvocet a500 [propoxyphene n-acetaminophen]     Ibudone [hydrocodone-ibuprofen]     Levaquin [levofloxacin]        No current facility-administered medications on file prior to encounter.      No current outpatient prescriptions on file prior to encounter.     Family History     None        Social History Main Topics    Smoking status: Current Every Day Smoker     Types: Cigarettes    Smokeless tobacco: Not on file    Alcohol use No    Drug use: No    Sexual activity: Not Currently     Review of Systems   Constitutional: Negative for activity change, appetite change, chills, diaphoresis, fatigue and fever.   HENT: Negative for dental problem and drooling.    Respiratory: Negative for cough, choking, shortness of breath, wheezing and stridor.    Cardiovascular: Negative for chest pain, palpitations and leg swelling.   Gastrointestinal: Negative for abdominal distention, abdominal pain, constipation and diarrhea.   Genitourinary: Negative for difficulty urinating, dysuria and hematuria.   Musculoskeletal: Negative for arthralgias and back pain.   Neurological: Negative for weakness.   Psychiatric/Behavioral: Negative for agitation and behavioral problems.     Objective:     Vital Signs (Most Recent):  Temp: 97.6 °F (36.4 °C) (01/06/18 2320)  Pulse: 75 (01/06/18 2320)  Resp: 20 (01/06/18 2320)  BP: 135/64 (01/06/18 2320)  SpO2: 97 % (01/06/18 2320) Vital Signs (24h Range):  Temp:  [97.6 °F (36.4 °C)] 97.6 °F (36.4 °C)  Pulse:  [75] 75  Resp:  [20] 20  SpO2:  [97 %] 97 %  BP:  (135)/(64) 135/64     Weight: 67.9 kg (149 lb 11.1 oz)  Body mass index is 22.11 kg/m².    Physical Exam   Constitutional: He is oriented to person, place, and time. He appears well-developed and well-nourished. No distress.   HENT:   Head: Normocephalic and atraumatic.   Mouth/Throat: No oropharyngeal exudate.   Cardiovascular: Normal rate and regular rhythm.  Exam reveals no friction rub.    No murmur heard.  Pulmonary/Chest: Effort normal and breath sounds normal. No respiratory distress. He has no wheezes.   Abdominal: Soft. Bowel sounds are normal. He exhibits distension. There is no tenderness. There is no guarding.   Musculoskeletal: Normal range of motion. He exhibits no edema or deformity.   Neurological: He is alert and oriented to person, place, and time. No cranial nerve deficit. Coordination normal.   Skin: He is not diaphoretic. There is pallor.   Vitals reviewed.          Significant Labs: All pertinent labs within the past 24 hours have been reviewed.    Significant Imaging: I have reviewed and interpreted all pertinent imaging results/findings within the past 24 hours.

## 2018-01-07 NOTE — ASSESSMENT & PLAN NOTE
- Unclear etiology for why he had duodenal stricture, underwent biopsy showed normal finding, other etiology could be pancreatic mass compressing the duodenal   - Underwent ERCP at [facility] with no successful attempt to pass through the stricture   - Consult AES for possible intervention (duodenal stent)   - No signs of GI obstruction, no nausea or vomiting, and he is having bowel movement   - He might need CT abdomen with pancreatic protocol to assess his pancreas, defer to day team

## 2018-01-07 NOTE — ASSESSMENT & PLAN NOTE
Patient transferred for [facility] evaluation for biliary stricture concerning for malignancy.  Overall, patient with high likelihood for malignancy given symptoms.  Overall, agree with [facility] consult for biopsy.  Please contact Hematology/Oncology consult service after pathology has been obtained.  Uncertain at this time if patient would be candidate for further treatment, given advanced age.  ECOG score of 2.  Would also recommend:    · Ca 19-9    For further evaluation.

## 2018-01-07 NOTE — SUBJECTIVE & OBJECTIVE
Interval History: afebrile feels overall well    Social history, fam history and surgical history reviewed with patient and his family    Review of Systems   Constitutional: Negative for activity change, appetite change, chills, fatigue and fever.   HENT: Negative for congestion, dental problem, mouth sores and sinus pressure.    Eyes: Negative for pain, redness and visual disturbance.   Respiratory: Negative for cough, shortness of breath and wheezing.    Cardiovascular: Negative for chest pain and leg swelling.   Gastrointestinal: Negative for abdominal distention, abdominal pain, diarrhea, nausea and vomiting.   Endocrine: Negative for polyuria.   Genitourinary: Negative for decreased urine volume, dysuria and frequency.   Musculoskeletal: Negative for joint swelling.   Skin: Negative for color change.   Allergic/Immunologic: Negative for food allergies.   Neurological: Negative for dizziness, weakness and headaches.   Hematological: Negative for adenopathy.   Psychiatric/Behavioral: Negative for agitation and confusion. The patient is not nervous/anxious.      Objective:     Vital Signs (Most Recent):  Temp: 98.2 °F (36.8 °C) (01/07/18 0752)  Pulse: 74 (01/07/18 0752)  Resp: 18 (01/07/18 0752)  BP: 134/65 (01/07/18 0752)  SpO2: 100 % (01/07/18 0752) Vital Signs (24h Range):  Temp:  [97.6 °F (36.4 °C)-98.2 °F (36.8 °C)] 98.2 °F (36.8 °C)  Pulse:  [70-75] 74  Resp:  [18-20] 18  SpO2:  [97 %-100 %] 100 %  BP: (108-135)/(53-65) 134/65     Weight: 67.9 kg (149 lb 11.1 oz)  Body mass index is 22.11 kg/m².    Estimated Creatinine Clearance: 35.7 mL/min (based on SCr of 1.4 mg/dL).    Physical Exam   Constitutional: He is oriented to person, place, and time. He appears well-developed and well-nourished.   HENT:   Head: Normocephalic and atraumatic.   Mouth/Throat: Oropharynx is clear and moist.   jaundiced   Eyes: Conjunctivae are normal.   Neck: Neck supple.   Cardiovascular: Normal rate, regular rhythm and normal  heart sounds.    No murmur heard.  Pulmonary/Chest: Effort normal and breath sounds normal. No respiratory distress. He has no wheezes.   Abdominal: Soft. Bowel sounds are normal. He exhibits no distension. There is no tenderness.   Musculoskeletal: Normal range of motion. He exhibits no edema or tenderness.   Lymphadenopathy:     He has no cervical adenopathy.   Neurological: He is alert and oriented to person, place, and time. Coordination normal.   Skin: Skin is warm and dry. No rash noted.   Psychiatric: He has a normal mood and affect. His behavior is normal.       Significant Labs:   CBC:   Recent Labs  Lab 01/07/18 0053 01/07/18 0421   WBC 3.72* 3.27*   HGB 8.0* 7.3*   HCT 24.4* 22.4*   * 102*     CMP:   Recent Labs  Lab 01/07/18 0053 01/07/18 0421    141   K 3.5 3.4*    109   CO2 23 24   * 95   BUN 19 20   CREATININE 1.5* 1.4   CALCIUM 9.0 8.8   PROT 5.9* 5.5*   ALBUMIN 2.3* 2.2*   BILITOT 30.8* 27.7*   ALKPHOS 450* 405*   AST 80* 69*   ALT 66* 57*   ANIONGAP 10 8   EGFRNONAA 41.3* 44.9*       Significant Imaging: I have reviewed all pertinent imaging results/findings within the past 24 hours.     OSH cultures:  12/12  Staph scuri 1/4 bottles  dapto APOLONIA 0.5  vanco 1.0  linezolid Sensitive  Vanco sensitive     1/2 sputum cx   Pseudomonas S to amkicin, cefepime, ceftaz, cipro, austin, pip/tazo, tobra, Intermediate to levo and aztreonam and gent

## 2018-01-07 NOTE — PT/OT/SLP EVAL
"Speech Language Pathology Evaluation  Bedside Swallow/Discharge Note    Patient Name:  Hal Sultana   MRN:  [id number]  Admitting Diagnosis: Obstructive jaundice    Recommendations:                 General Recommendations:  none  Diet recommendations:  Regular, Other (see comments) (when cleared by MD to begin solids), Thin   Aspiration Precautions: HOB to 90 degrees, Meds whole buried in puree, Remain upright 30 minutes post meal, Small bites/sips and Standard aspiration precautions   General Precautions: Standard,    Communication strategies:  none    History:     Past Medical History:   Diagnosis Date    AAA (abdominal aortic aneurysm)     BPH (benign prostatic hyperplasia)     Duodenal stricture     Hypertension        Past Surgical History:   Procedure Laterality Date    ABDOMINAL AORTIC ANEURYSM REPAIR      CORONARY ARTERY BYPASS GRAFT      ERCP      ESOPHAGOGASTRODUODENOSCOPY  12/13/2017       Social History: Patient lives with wife    Prior diet: regular/thin        Subjective     " I don't have any problems. They just don't let me eat solids because of the blockage"  Patient goals: to eat    Objective:     Oral Musculature Evaluation  · Oral Musculature: WFL  · Dentition: present and adequate  · Mucosal Quality: good  · Lingual Strength and Mobility: WFL  · Velar Elevation: WFL  · Buccal Strength and Mobility: WFL  · Volitional Cough: adequate  · Voice Prior to PO Intake: clear; slightly hoarse; baseline per family    Bedside Swallow Eval:   Consistencies Assessed:  · Thin liquids 2 cups via straw  · Puree teaspoon   · Solids 1 bite of cracker     Oral Phase:   · WFL    Pharyngeal Phase:   · no overt clinical signs/symptoms of pharyngeal dysphagia    Compensatory Strategies  · None    Treatment: Nursing cleared pt for solid trials. Nursing reported difficulty with pt taking pills this am. Pt expressed difficulty with large pills. Pt with bowel obstruction and on full liquid diet. Pt with no overt s/s " of aspiration following any of the consistencies. Discussed that large pills can be buried whole in pureed bolus or crushed if needed. Reviewed s/s fo aspiration and general swallowing precautions. Pt and family with good understanding. White board updated. No further speech tx recommended. Recommend regular diet when cleared for solids.     Assessment:     Hal Sultana is a 87 y.o. male with an SLP diagnosis of adequate oral pharyngeal phase of swallowing.  He presents with no overt s/s of aspiration.     Goals:    SLP Goals        Problem: SLP Goal    Goal Priority Disciplines Outcome   SLP Goal     SLP                    Plan:     · Patient to be seen:      · Plan of Care expires:     · Plan of Care reviewed with:  patient, family   · SLP Follow-Up:  No       Discharge recommendations:   (no further speech tx recommended)     Time Tracking:     SLP Treatment Date:   01/07/18  Speech Start Time:  1352  Speech Stop Time:  1405     Speech Total Time (min):  13 min    Billable Minutes: Eval Swallow and Oral Function 13    EPIFANIO Vázquez, CCC-SLP  01/07/2018

## 2018-01-07 NOTE — CONSULTS
[facility]  Infectious Disease  Consult Note    Patient Name: Hal Sultana  MRN: [id number]  Admission Date: 1/7/2018  Hospital Length of Stay: 0 days  Attending Physician: Levar Rosado MD  Primary Care Provider: Kaycee Ortega MD     Isolation Status: No active isolations        Inpatient consult to Infectious Diseases  Consult performed by: SARAH CELIS  Consult ordered by: KENNETH OROZCO  Reason for consult: h/o bacteremia  Assessment/Recommendations: Consult received, see prog note

## 2018-01-07 NOTE — PLAN OF CARE
Problem: SLP Goal  Goal: SLP Goal  Swallow eval completed. Pt with no overt s/s of aspiration with any consistency.     EPIFANIO Vázquez, CCC-SLP  1/7/2018

## 2018-01-07 NOTE — ASSESSMENT & PLAN NOTE
- Remote history of CAD and CABG  - Currently stable, no chest pain, on Aspirin and Statin, will continue

## 2018-01-07 NOTE — SUBJECTIVE & OBJECTIVE
Past Medical History:   Diagnosis Date    AAA (abdominal aortic aneurysm)     BPH (benign prostatic hyperplasia)     Duodenal stricture     Hypertension        Past Surgical History:   Procedure Laterality Date    ABDOMINAL AORTIC ANEURYSM REPAIR      CORONARY ARTERY BYPASS GRAFT      ERCP      ESOPHAGOGASTRODUODENOSCOPY  12/13/2017       Review of patient's allergies indicates:   Allergen Reactions    Avelox [moxifloxacin]     Darvocet a500 [propoxyphene n-acetaminophen]     Ibudone [hydrocodone-ibuprofen]     Levaquin [levofloxacin]      Family History     Problem Relation (Age of Onset)    No Known Problems Mother, Father        Social History Main Topics    Smoking status: Current Every Day Smoker     Types: Cigarettes    Smokeless tobacco: Not on file    Alcohol use No    Drug use: No    Sexual activity: Not Currently     Review of Systems   Constitutional: Positive for appetite change, fatigue and unexpected weight change. Negative for activity change, chills, diaphoresis and fever.   HENT: Negative for trouble swallowing.    Eyes: Negative.    Respiratory: Negative.    Cardiovascular: Negative.    Gastrointestinal: Negative for abdominal distention, abdominal pain, anal bleeding, blood in stool, constipation, diarrhea, nausea, rectal pain and vomiting.   Genitourinary: Negative.    Musculoskeletal: Negative.    Skin: Positive for color change.   Neurological: Negative.      Objective:     Vital Signs (Most Recent):  Temp: 98.2 °F (36.8 °C) (01/07/18 0752)  Pulse: 74 (01/07/18 0752)  Resp: 18 (01/07/18 0752)  BP: 134/65 (01/07/18 0752)  SpO2: 100 % (01/07/18 0752) Vital Signs (24h Range):  Temp:  [97.6 °F (36.4 °C)-98.2 °F (36.8 °C)] 98.2 °F (36.8 °C)  Pulse:  [70-75] 74  Resp:  [18-20] 18  SpO2:  [97 %-100 %] 100 %  BP: (108-135)/(53-65) 134/65     Weight: 67.9 kg (149 lb 11.1 oz) (01/06/18 2320)  Body mass index is 22.11 kg/m².      Intake/Output Summary (Last 24 hours) at 01/07/18  1156  Last data filed at 01/07/18 0426   Gross per 24 hour   Intake              170 ml   Output              100 ml   Net               70 ml       Lines/Drains/Airways     Peripherally Inserted Central Catheter Line                 PICC Double Lumen 12/31/17 right brachial 7 days                Physical Exam   Constitutional: He is oriented to person, place, and time. No distress.   Eyes: Scleral icterus is present.   Cardiovascular: Normal rate and regular rhythm.    Pulmonary/Chest: Effort normal and breath sounds normal.   Abdominal: Soft. Bowel sounds are normal. He exhibits no distension and no mass. There is no tenderness. There is no rebound and no guarding. No hernia.   Musculoskeletal: Normal range of motion. He exhibits no edema.   Neurological: He is alert and oriented to person, place, and time.   Skin: He is not diaphoretic.       Significant Labs:  CBC:   Recent Labs  Lab 01/07/18  0053 01/07/18 0421   WBC 3.72* 3.27*   HGB 8.0* 7.3*   HCT 24.4* 22.4*   * 102*     CMP:   Recent Labs  Lab 01/07/18 0421   GLU 95   CALCIUM 8.8   ALBUMIN 2.2*   PROT 5.5*      K 3.4*   CO2 24      BUN 20   CREATININE 1.4   ALKPHOS 405*   ALT 57*   AST 69*   BILITOT 27.7*     Coagulation: No results for input(s): PT, INR, APTT in the last 48 hours.    Significant Imaging:  Imaging results within the past 24 hours have been reviewed.

## 2018-01-07 NOTE — HPI
This is Mr. Hal Sultana, 87 year old male with PMHx is significant for acquired pancytopenia, aortic aneurysm, stable CAD, esophageal dysmotility, gout, hypertension. Myelodysplastic syndrome. He is a transfer from [place], [place]. He was accepted for transfer by Dr. Sanchez from [facility] for possible intervention for obstructive jaundice.     He presented to ID clinic on January 1st, 2018 with worsening jaundice and T. Bili reached 19. He was discharged from the same hospital on 12/29/2017 after admission for pruritus and jaundice, underwent MRCP was inconclusive because of motion artifact. Plan to do ERCP; however not done because of improvement in his LFTs. During his December stay, where he initially presented with abdominal bloating and weight loss, he was found to have Staphylococcus sciuri bacteremia which was treated with Doxycycline and Daptomycin (with plan to end date on 2/2018 and intention to complete the therapy on with home infusion care). He underwent EGD and biopsy on 12/13/2017 with result showed no evidence of tumor or dysplasia. He also found to have severe duodenal stricture with plan for intervention ([facility] versus surgically).    For the January 2018 admission, he underwent different investigation for his worsening LFTs. CT abdomen on 1/3/2018 showed interval development of intrahepatic biliary ductal dilation with continued dilatation of CBD, no pancreas lesion. On 1/3/2018 he underwent ERCP which showed Tight duodenal stricture in the post bulbar area with inability to get the scope to pass the stricture with concern this could be a malignant duodenal lesion. On 1/5/2018, he underwent percutaneous biliary drain despite multiple phases, and possibly due to intrahepatic duct dilation made the procedure difficult. On 1/5/2018 he underwent Based on labs for the last couple of days were T. Bili 19 and Direct Bili 14, Alk Phosph 469 ALT 52 AST 50. Renal function panel is within normal  range. I reviewed medical charts from [facility] [place] [facility] and summarize his charts.

## 2018-01-07 NOTE — CONSULTS
[facility]  Gastroenterology  Consult Note    Patient Name: Hal Sultana  MRN: [id number]  Admission Date: 1/7/2018  Hospital Length of Stay: 0 days  Code Status: Full Code   Attending Provider: Levar Rosado MD   Consulting Provider: Zoe Stevenson MD  Primary Care Physician: Kaycee Ortega MD  Principal Problem:Obstructive jaundice    Inpatient consult to [facility]  Consult performed by: ZOE STEVENSON  Consult ordered by: KENNETH OROZCO        Subjective:     HPI:  87 year old male with a history of HTN and MDS on who [facility] is being consulted for obstructive jaundice.    History provided by patient and family.  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  Admitted now after being found to have increasing bili. He had a Ct, ERCP, and attempt PTC placement all reported below.  Now [place] [facility] he denies any nausea, emesis, or abdominal pain. He is tolerating liquids without an issue.    Imaging/procedures from outside hospital:  CT abdomen/pelvis on 1/3/18  Interval development of intrahepatic biliary ductal dilation with CBD dilation  ERCP on 1/3/18  Tight duodenal stricture in the post bulbar area with inability to get the scope past the stricture  PTC on 1/5/18 attempted but unsuccessful    Past Medical History:   Diagnosis Date    AAA (abdominal aortic aneurysm)     BPH (benign prostatic hyperplasia)     Duodenal stricture     Hypertension        Past Surgical History:   Procedure Laterality Date    ABDOMINAL AORTIC ANEURYSM REPAIR      CORONARY ARTERY BYPASS GRAFT      ERCP      ESOPHAGOGASTRODUODENOSCOPY  12/13/2017       Review of patient's allergies indicates:   Allergen Reactions    Avelox [moxifloxacin]     Darvocet a500 [propoxyphene n-acetaminophen]     Ibudone [hydrocodone-ibuprofen]     Levaquin  [levofloxacin]      Family History     Problem Relation (Age of Onset)    No Known Problems Mother, Father        Social History Main Topics    Smoking status: Current Every Day Smoker     Types: Cigarettes    Smokeless tobacco: Not on file    Alcohol use No    Drug use: No    Sexual activity: Not Currently     Review of Systems   Constitutional: Positive for appetite change, fatigue and unexpected weight change. Negative for activity change, chills, diaphoresis and fever.   HENT: Negative for trouble swallowing.    Eyes: Negative.    Respiratory: Negative.    Cardiovascular: Negative.    Gastrointestinal: Negative for abdominal distention, abdominal pain, anal bleeding, blood in stool, constipation, diarrhea, nausea, rectal pain and vomiting.   Genitourinary: Negative.    Musculoskeletal: Negative.    Skin: Positive for color change.   Neurological: Negative.      Objective:     Vital Signs (Most Recent):  Temp: 98.2 °F (36.8 °C) (01/07/18 0752)  Pulse: 74 (01/07/18 0752)  Resp: 18 (01/07/18 0752)  BP: 134/65 (01/07/18 0752)  SpO2: 100 % (01/07/18 0752) Vital Signs (24h Range):  Temp:  [97.6 °F (36.4 °C)-98.2 °F (36.8 °C)] 98.2 °F (36.8 °C)  Pulse:  [70-75] 74  Resp:  [18-20] 18  SpO2:  [97 %-100 %] 100 %  BP: (108-135)/(53-65) 134/65     Weight: 67.9 kg (149 lb 11.1 oz) (01/06/18 2320)  Body mass index is 22.11 kg/m².      Intake/Output Summary (Last 24 hours) at 01/07/18 1156  Last data filed at 01/07/18 0426   Gross per 24 hour   Intake              170 ml   Output              100 ml   Net               70 ml       Lines/Drains/Airways     Peripherally Inserted Central Catheter Line                 PICC Double Lumen 12/31/17 right brachial 7 days                Physical Exam   Constitutional: He is oriented to person, place, and time. No distress.   Eyes: Scleral icterus is present.   Cardiovascular: Normal rate and regular rhythm.    Pulmonary/Chest: Effort normal and breath sounds normal.   Abdominal:  Soft. Bowel sounds are normal. He exhibits no distension and no mass. There is no tenderness. There is no rebound and no guarding. No hernia.   Musculoskeletal: Normal range of motion. He exhibits no edema.   Neurological: He is alert and oriented to person, place, and time.   Skin: He is not diaphoretic.       Significant Labs:  CBC:   Recent Labs  Lab 01/07/18  0053 01/07/18  0421   WBC 3.72* 3.27*   HGB 8.0* 7.3*   HCT 24.4* 22.4*   * 102*     CMP:   Recent Labs  Lab 01/07/18  0421   GLU 95   CALCIUM 8.8   ALBUMIN 2.2*   PROT 5.5*      K 3.4*   CO2 24      BUN 20   CREATININE 1.4   ALKPHOS 405*   ALT 57*   AST 69*   BILITOT 27.7*     Coagulation: No results for input(s): PT, INR, APTT in the last 48 hours.    Significant Imaging:  Imaging results within the past 24 hours have been reviewed.    Assessment/Plan:     * Obstructive jaundice    87 year old male with a history of HTN and MDS on who [facility] is being consulted for obstructive jaundice. His outside Ct, ERCP, and records of failed PTC drained were reviewed. At this point no need for further imaging, we will proceed with EUS +/- ERCP tomorrow to further investigate case of obstructive jaundice ([place] no large pancreatic mass, malignancy of that area remains high).    Recommendations:  -NPO after MN for EUS +/- ERCP  -Check INR today if elevated can have vitamin K IV, also check it in the AM            Thank you for your consult. I will follow-up with patient. Please contact us if you have any additional questions.    Carla Ramos M.D.  Gastroenterology Fellow, PGY-IV  Pager: [phone]  [facility]

## 2018-01-07 NOTE — ASSESSMENT & PLAN NOTE
87 year old male with a history of HTN and MDS on who [facility] is being consulted for obstructive jaundice. His outside Ct, ERCP, and records of failed PTC drained were reviewed. At this point no need for further imaging, we will proceed with EUS +/- ERCP tomorrow to further investigate case of obstructive jaundice ([place] no large pancreatic mass, malignancy of that area remains high).    Recommendations:  -NPO after MN for EUS +/- ERCP  -Check INR today if elevated can have vitamin K IV, also check it in the AM

## 2018-01-07 NOTE — ASSESSMENT & PLAN NOTE
- Underwent EGD, and no anatomic abnormalities in EGD   - Stable on Pantoprazole for symptomatic GERD

## 2018-01-07 NOTE — ASSESSMENT & PLAN NOTE
88 y/o M h/o AAA, CAD, PPM, esophageal dysmotility, gouth, HTN, MDS on azacidatine  transferred 1/7 from [facility] in [place] for higher level intervention for obstructive jaundice  and duodenal stricture. Pt recently hospitalized 12/2017 with staph sciuri bacteremia treated with daptomycin. Pt overall clinically improved.  Given patients story of intermittent fevers over past year unclear etiology but may be from MDS, cholangitic obstruction, drug fevers.  Staph grew from 1/4 bottles (i have discussed with micro lab at OSH) pt was discharged with no therapy then readmitted with fevers and reportedly admit cultures were negative. So to me it is not clear that staph sp that grew was truly pathogenic.  Pt does have PPM so may be at some risk for seeding but would have expected more prolonged period of bacteremia.  At this time would finish 4 weeks of daptomycin (finishes 1/12) and would have patient f/u with his primary ID doctor in [place].  Monitor weekly CPKs while on dapto.  And call back if admit blood cultures here are positive  - all of the above were discussed with patient and his family at length pros and cons of abx/PICC line and also potential need in future for repeat bcx if febrile given possibility of PPM seeding  - discussed with team  - will sign off please call back if questions

## 2018-01-07 NOTE — ASSESSMENT & PLAN NOTE
- On Decemebr 2017 admission found to have Staphylococcus sciuri bactremia   - Was treated with Doxycycline and Daptomycin (with plan to end date on 2/2018 and intention to complete the therapy on with home infusion care).  - ID consulted and appreciate recs  - Home infusion center called and pt was on daptomycin 430mg IV with end date scheduled for 2/7/18  - Will continue Daptomycin 500mg IV qday with weekly CPK labs

## 2018-01-07 NOTE — PROGRESS NOTES
[facility]  Infectious Disease  Progress Note    Patient Name: Hal Sultana  MRN: [id number]  Admission Date: 1/7/2018  Length of Stay: 0 days  Attending Physician: Levar Rosado MD  Primary Care Provider: Kaycee Ortega MD    Isolation Status: No active isolations  Assessment/Plan:      Bacteremia due to Staphylococcus    86 y/o M h/o AAA, CAD, PPM, esophageal dysmotility, gouth, HTN, MDS on azacidatine  transferred 1/7 from [facility] in [place] for higher level intervention for obstructive jaundice  and duodenal stricture. Pt recently hospitalized 12/2017 with staph sciuri bacteremia treated with daptomycin. Pt overall clinically improved.  Given patients story of intermittent fevers over past year unclear etiology but may be from MDS, cholangitic obstruction, drug fevers.  Staph grew from 1/4 bottles (i have discussed with micro lab at OSH) pt was discharged with no therapy then readmitted with fevers and reportedly admit cultures were negative. So to me it is not clear that staph sp that grew was truly pathogenic.  Pt does have PPM so may be at some risk for seeding but would have expected more prolonged period of bacteremia.  At this time would finish 4 weeks of daptomycin (finishes 1/12) and would have patient f/u with his primary ID doctor in [place].  Monitor weekly CPKs while on dapto.  And call back if admit blood cultures here are positive  - all of the above were discussed with patient and his family at length pros and cons of abx/PICC line and also potential need in future for repeat bcx if febrile given possibility of PPM seeding  - discussed with team  - will sign off please call back if questions            Anticipated Disposition: pending    Thank you for your consult. I will sign off. Please contact us if you have any additional questions.    Best Naqvi MD  Infectious Disease  [facility]    Subjective:     Principal Problem:Obstructive  jaundice    HPI: No notes on file  Interval History: afebrile feels overall well    Social history, fam history and surgical history reviewed with patient and his family    Review of Systems   Constitutional: Negative for activity change, appetite change, chills, fatigue and fever.   HENT: Negative for congestion, dental problem, mouth sores and sinus pressure.    Eyes: Negative for pain, redness and visual disturbance.   Respiratory: Negative for cough, shortness of breath and wheezing.    Cardiovascular: Negative for chest pain and leg swelling.   Gastrointestinal: Negative for abdominal distention, abdominal pain, diarrhea, nausea and vomiting.   Endocrine: Negative for polyuria.   Genitourinary: Negative for decreased urine volume, dysuria and frequency.   Musculoskeletal: Negative for joint swelling.   Skin: Negative for color change.   Allergic/Immunologic: Negative for food allergies.   Neurological: Negative for dizziness, weakness and headaches.   Hematological: Negative for adenopathy.   Psychiatric/Behavioral: Negative for agitation and confusion. The patient is not nervous/anxious.      Objective:     Vital Signs (Most Recent):  Temp: 98.2 °F (36.8 °C) (01/07/18 0752)  Pulse: 74 (01/07/18 0752)  Resp: 18 (01/07/18 0752)  BP: 134/65 (01/07/18 0752)  SpO2: 100 % (01/07/18 0752) Vital Signs (24h Range):  Temp:  [97.6 °F (36.4 °C)-98.2 °F (36.8 °C)] 98.2 °F (36.8 °C)  Pulse:  [70-75] 74  Resp:  [18-20] 18  SpO2:  [97 %-100 %] 100 %  BP: (108-135)/(53-65) 134/65     Weight: 67.9 kg (149 lb 11.1 oz)  Body mass index is 22.11 kg/m².    Estimated Creatinine Clearance: 35.7 mL/min (based on SCr of 1.4 mg/dL).    Physical Exam   Constitutional: He is oriented to person, place, and time. He appears well-developed and well-nourished.   HENT:   Head: Normocephalic and atraumatic.   Mouth/Throat: Oropharynx is clear and moist.   jaundiced   Eyes: Conjunctivae are normal.   Neck: Neck supple.   Cardiovascular: Normal  rate, regular rhythm and normal heart sounds.    No murmur heard.  Pulmonary/Chest: Effort normal and breath sounds normal. No respiratory distress. He has no wheezes.   Abdominal: Soft. Bowel sounds are normal. He exhibits no distension. There is no tenderness.   Musculoskeletal: Normal range of motion. He exhibits no edema or tenderness.   Lymphadenopathy:     He has no cervical adenopathy.   Neurological: He is alert and oriented to person, place, and time. Coordination normal.   Skin: Skin is warm and dry. No rash noted.   Psychiatric: He has a normal mood and affect. His behavior is normal.       Significant Labs:   CBC:   Recent Labs  Lab 01/07/18 0053 01/07/18 0421   WBC 3.72* 3.27*   HGB 8.0* 7.3*   HCT 24.4* 22.4*   * 102*     CMP:   Recent Labs  Lab 01/07/18 0053 01/07/18 0421    141   K 3.5 3.4*    109   CO2 23 24   * 95   BUN 19 20   CREATININE 1.5* 1.4   CALCIUM 9.0 8.8   PROT 5.9* 5.5*   ALBUMIN 2.3* 2.2*   BILITOT 30.8* 27.7*   ALKPHOS 450* 405*   AST 80* 69*   ALT 66* 57*   ANIONGAP 10 8   EGFRNONAA 41.3* 44.9*       Significant Imaging: I have reviewed all pertinent imaging results/findings within the past 24 hours.     OSH cultures:  12/12  Staph scuri 1/4 bottles  dapto APOLONIA 0.5  vanco 1.0  linezolid Sensitive  Vanco sensitive     1/2 sputum cx   Pseudomonas S to amkicin, cefepime, ceftaz, cipro, austin, pip/tazo, tobra, Intermediate to levo and aztreonam and gent

## 2018-01-07 NOTE — ASSESSMENT & PLAN NOTE
- On Amlodipine 5 mg and Sotalol 40 mg PO BID   - Stable blood pressure will continue home medications

## 2018-01-07 NOTE — HPI
Hal Sultana is a  87 year old gentleman with MDS on Vidaza, aortic aneurysm, stable CAD, esophageal dysmotility, gout, hypertension.  He is a transfer from [facility] at [place], [place]. He was accepted for transfer by Dr. Sanchez from [facility] for possible intervention for obstructive jaundice.  He was noted in outpatient workup with infectious disease to have significant jaundice with T. Bili of 19.   He has overall had 2 admissions, the first of which determined to have bacteremia, of which he is currently being treated.  In addition, he had an MRCP performed, which was inconclusive 2/2 motion artifact.  EGD also performed, where there was no evidence of malignancy.  He went to ID follow up with noted worsening hyperbilirubinemia, prompting further work-up.  CT abdomen on 1/3/2018 showed interval development of intrahepatic biliary ductal dilation with continued dilatation of CBD, no pancreas lesion. On 1/3/2018 he underwent ERCP which showed tight duodenal stricture in the post bulbar area with inability to get the scope to pass the stricture with concern this could be a malignant duodenal lesion. On 1/5/2018, he underwent percutaneous biliary drain despite multiple phases, and possibly due to intrahepatic duct dilation made the procedure difficult.     He was diagnosed with MDS 18 months ago, primarily when his cardiologist noted his anemia.  He then seen Dr. Carrasco in [facility] for further evaluation, where he notes to have had a bone marrow biopsy, confirming MDS.  He was then started on Vidaza (5 days every month).  He would note that he would develop fevers every 2 weeks out with a maximum temperature of 101, of which would last for approximately 5 days; the fevers were thought to have been 2/2 Vidaza.  He also notes weight loss starting with therapy, where he initially weighed 175 lbs, and now has dropped to 140 lbs.  Interestingly, most of his weight loss and his associated generalized fatigue began  approximately 1 month ago, in addition to his jaundice.  He is a former smoker, quit 40 years ago, 1 PPD.  Worked as an .  Prior to hospitalization, he was able to complete all his ADLs.  Currently spends > 50 % of time out of bed.

## 2018-01-07 NOTE — ASSESSMENT & PLAN NOTE
Hal Sultana is a 87 y.o. gentleman with recently diagnosed MDS (18 months ago) who presents for evaluation of painless obstructive jaundice.  Overall, while we do not have work-up, we do know the patient is on Vidaza for treatment.  As of now, we would agree to continue holding medication in setting of additional malignancy work-up.  Transfuse as needed per normal guidelines.

## 2018-01-07 NOTE — PLAN OF CARE
Problem: Patient Care Overview  Goal: Plan of Care Review  Outcome: Ongoing (interventions implemented as appropriate)  Patient transferred via ambulance from [place], [place] in stable condition.  AAOx4 VS wnl.  Complaints of mild right upper quad tenderness.  Out of bed with standby assist.  Daughter is staying in [facility] and left her phone number in a sticky note.  Consults made for infectious disease and AES for ERCP and dueduonal stent placement, pt aware of npo status.  Reports only able to tolerate clear liquids x 2 weeks and unknown amount of weight loss.  Cough present and loose sounding states that productive sputum is at times clear and frothy. Speech to eval and treat.  Has pacemaker to left upper chest and picc line to right upper arm placement verified by xray and has good blood return dressing changed.  Iv antibiotics given as ordered.  Patient jaundiced and c/o itching benadryl given po with mild relief obtained.  Bilateral hearing aids and glasses at bedside.

## 2018-01-07 NOTE — SUBJECTIVE & OBJECTIVE
Oncology Treatment Plan:   [No treatment plan]    Medications:  Continuous Infusions:  Scheduled Meds:   amLODIPine  5 mg Oral Daily    aspirin  81 mg Oral Daily    DAPTOmycin (CUBICIN)  IV  500 mg Intravenous Q24H    magnesium sulfate IVPB  2 g Intravenous Once    montelukast  10 mg Oral Daily    pantoprazole  40 mg Oral Daily    polyethylene glycol  17 g Oral Daily    potassium chloride  40 mEq Oral Once    pravastatin  20 mg Oral Daily    sotalol  40 mg Oral BID    tamsulosin  0.4 mg Oral Daily     PRN Meds:dextrose 50%, dextrose 50%, diphenhydrAMINE, diphenhydrAMINE-zinc acetate 1-0.1%, glucagon (human recombinant), glucose, glucose, insulin aspart, sodium chloride 0.9%     Review of patient's allergies indicates:   Allergen Reactions    Avelox [moxifloxacin]     Darvocet a500 [propoxyphene n-acetaminophen]     Ibudone [hydrocodone-ibuprofen]     Levaquin [levofloxacin]         Past Medical History:   Diagnosis Date    AAA (abdominal aortic aneurysm)     BPH (benign prostatic hyperplasia)     Duodenal stricture     Hypertension      Past Surgical History:   Procedure Laterality Date    ABDOMINAL AORTIC ANEURYSM REPAIR      CORONARY ARTERY BYPASS GRAFT      ERCP      ESOPHAGOGASTRODUODENOSCOPY  12/13/2017     Family History     Problem Relation (Age of Onset)    No Known Problems Mother, Father        Social History Main Topics    Smoking status: Current Every Day Smoker     Types: Cigarettes    Smokeless tobacco: Not on file    Alcohol use No    Drug use: No    Sexual activity: Not Currently       Review of Systems   Constitutional: Positive for unexpected weight change. Negative for activity change, appetite change, chills, diaphoresis, fatigue and fever.   HENT: Negative for dental problem and drooling.    Respiratory: Negative for cough, choking, shortness of breath, wheezing and stridor.    Cardiovascular: Negative for chest pain, palpitations and leg swelling.   Gastrointestinal:  Negative for abdominal distention, abdominal pain, constipation and diarrhea.   Genitourinary: Negative for difficulty urinating, dysuria and hematuria.   Musculoskeletal: Negative for arthralgias and back pain.   Neurological: Negative for weakness.   Psychiatric/Behavioral: Negative for agitation and behavioral problems.     Objective:     Vital Signs (Most Recent):  Temp: 98.2 °F (36.8 °C) (01/07/18 0752)  Pulse: 74 (01/07/18 0752)  Resp: 18 (01/07/18 0752)  BP: 134/65 (01/07/18 0752)  SpO2: 100 % (01/07/18 0752) Vital Signs (24h Range):  Temp:  [97.6 °F (36.4 °C)-98.2 °F (36.8 °C)] 98.2 °F (36.8 °C)  Pulse:  [70-75] 74  Resp:  [18-20] 18  SpO2:  [97 %-100 %] 100 %  BP: (108-135)/(53-65) 134/65     Weight: 67.9 kg (149 lb 11.1 oz)  Body mass index is 22.11 kg/m².  Body surface area is 1.82 meters squared.      Intake/Output Summary (Last 24 hours) at 01/07/18 0950  Last data filed at 01/07/18 0426   Gross per 24 hour   Intake              170 ml   Output              100 ml   Net               70 ml       Physical Exam   Constitutional: He is oriented to person, place, and time. He appears well-developed and well-nourished. No distress.   Jaundice    HENT:   Head: Normocephalic and atraumatic.   Mouth/Throat: No oropharyngeal exudate.   Eyes: Scleral icterus is present.   Cardiovascular: Normal rate and regular rhythm.  Exam reveals no friction rub.    No murmur heard.  Pulmonary/Chest: Effort normal and breath sounds normal. No respiratory distress. He has no wheezes.   Abdominal: Soft. Bowel sounds are normal. He exhibits distension. There is no tenderness. There is no guarding.   Musculoskeletal: Normal range of motion. He exhibits no edema or deformity.   Neurological: He is alert and oriented to person, place, and time. No cranial nerve deficit. Coordination normal.   Skin: He is not diaphoretic. There is pallor.   Vitals reviewed.      Significant Labs:     Recent Results (from the past 24 hour(s))    Hemoglobin A1c    Collection Time: 01/07/18 12:53 AM   Result Value Ref Range    Hemoglobin A1C <4.0 (A) 4.0 - 5.6 %    Estimated Avg Glucose Unable to calculate 68 - 131 mg/dL   Comprehensive Metabolic Panel (CMP)    Collection Time: 01/07/18 12:53 AM   Result Value Ref Range    Sodium 141 136 - 145 mmol/L    Potassium 3.5 3.5 - 5.1 mmol/L    Chloride 108 95 - 110 mmol/L    CO2 23 23 - 29 mmol/L    Glucose 115 (H) 70 - 110 mg/dL    BUN, Bld 19 8 - 23 mg/dL    Creatinine 1.5 (H) 0.5 - 1.4 mg/dL    Calcium 9.0 8.7 - 10.5 mg/dL    Total Protein 5.9 (L) 6.0 - 8.4 g/dL    Albumin 2.3 (L) 3.5 - 5.2 g/dL    Total Bilirubin 30.8 (H) 0.1 - 1.0 mg/dL    Alkaline Phosphatase 450 (H) 55 - 135 U/L    AST 80 (H) 10 - 40 U/L    ALT 66 (H) 10 - 44 U/L    Anion Gap 10 8 - 16 mmol/L    eGFR if African American 47.7 (A) >60 mL/min/1.73 m^2    eGFR if non  41.3 (A) >60 mL/min/1.73 m^2   Phosphorus    Collection Time: 01/07/18 12:53 AM   Result Value Ref Range    Phosphorus 2.0 (L) 2.7 - 4.5 mg/dL   Bilirubin, direct    Collection Time: 01/07/18 12:53 AM   Result Value Ref Range    Bilirubin, Direct >14.0 (H) 0.1 - 0.3 mg/dL   Lipase    Collection Time: 01/07/18 12:53 AM   Result Value Ref Range    Lipase 7 4 - 60 U/L   Gamma GT    Collection Time: 01/07/18 12:53 AM   Result Value Ref Range     (H) 8 - 55 U/L   CBC with Automated Differential    Collection Time: 01/07/18 12:53 AM   Result Value Ref Range    WBC 3.72 (L) 3.90 - 12.70 K/uL    RBC 2.53 (L) 4.60 - 6.20 M/uL    Hemoglobin 8.0 (L) 14.0 - 18.0 g/dL    Hematocrit 24.4 (L) 40.0 - 54.0 %    MCV 96 82 - 98 fL    MCH 31.6 (H) 27.0 - 31.0 pg    MCHC 32.8 32.0 - 36.0 g/dL    RDW 22.5 (H) 11.5 - 14.5 %    Platelets 117 (L) 150 - 350 K/uL    MPV 11.1 9.2 - 12.9 fL    Immature Granulocytes 3.8 (H) 0.0 - 0.5 %    Gran # 2.3 1.8 - 7.7 K/uL    Immature Grans (Abs) 0.14 (H) 0.00 - 0.04 K/uL    Lymph # 0.9 (L) 1.0 - 4.8 K/uL    Mono # 0.4 0.3 - 1.0 K/uL    Eos # 0.0  0.0 - 0.5 K/uL    Baso # 0.02 0.00 - 0.20 K/uL    nRBC 0 0 /100 WBC    Gran% 61.0 38.0 - 73.0 %    Lymph% 23.4 18.0 - 48.0 %    Mono% 10.5 4.0 - 15.0 %    Eosinophil% 0.8 0.0 - 8.0 %    Basophil% 0.5 0.0 - 1.9 %    Platelet Estimate Decreased (A)     Aniso Slight     Poly Occasional     Hypo Occasional     Basophilic Stippling Occasional     Differential Method Automated    Cancer antigen 19-9    Collection Time: 01/07/18 12:53 AM   Result Value Ref Range    CA 19-9 >97000.0 (H) 2.0 - 40.0 U/mL   CK    Collection Time: 01/07/18 12:53 AM   Result Value Ref Range    CPK 24 20 - 200 U/L   Comprehensive Metabolic Panel (CMP)    Collection Time: 01/07/18  4:21 AM   Result Value Ref Range    Sodium 141 136 - 145 mmol/L    Potassium 3.4 (L) 3.5 - 5.1 mmol/L    Chloride 109 95 - 110 mmol/L    CO2 24 23 - 29 mmol/L    Glucose 95 70 - 110 mg/dL    BUN, Bld 20 8 - 23 mg/dL    Creatinine 1.4 0.5 - 1.4 mg/dL    Calcium 8.8 8.7 - 10.5 mg/dL    Total Protein 5.5 (L) 6.0 - 8.4 g/dL    Albumin 2.2 (L) 3.5 - 5.2 g/dL    Total Bilirubin 27.7 (H) 0.1 - 1.0 mg/dL    Alkaline Phosphatase 405 (H) 55 - 135 U/L    AST 69 (H) 10 - 40 U/L    ALT 57 (H) 10 - 44 U/L    Anion Gap 8 8 - 16 mmol/L    eGFR if African American 51.9 (A) >60 mL/min/1.73 m^2    eGFR if non African American 44.9 (A) >60 mL/min/1.73 m^2   Magnesium    Collection Time: 01/07/18  4:21 AM   Result Value Ref Range    Magnesium 1.6 1.6 - 2.6 mg/dL   Phosphorus    Collection Time: 01/07/18  4:21 AM   Result Value Ref Range    Phosphorus 2.0 (L) 2.7 - 4.5 mg/dL   CBC with Automated Differential    Collection Time: 01/07/18  4:21 AM   Result Value Ref Range    WBC 3.27 (L) 3.90 - 12.70 K/uL    RBC 2.31 (L) 4.60 - 6.20 M/uL    Hemoglobin 7.3 (L) 14.0 - 18.0 g/dL    Hematocrit 22.4 (L) 40.0 - 54.0 %    MCV 97 82 - 98 fL    MCH 31.6 (H) 27.0 - 31.0 pg    MCHC 32.6 32.0 - 36.0 g/dL    RDW 22.5 (H) 11.5 - 14.5 %    Platelets 102 (L) 150 - 350 K/uL    MPV 11.4 9.2 - 12.9 fL     Immature Granulocytes 4.0 (H) 0.0 - 0.5 %    Gran # 1.8 1.8 - 7.7 K/uL    Immature Grans (Abs) 0.13 (H) 0.00 - 0.04 K/uL    Lymph # 1.0 1.0 - 4.8 K/uL    Mono # 0.4 0.3 - 1.0 K/uL    Eos # 0.0 0.0 - 0.5 K/uL    Baso # 0.00 0.00 - 0.20 K/uL    nRBC 0 0 /100 WBC    Gran% 54.1 38.0 - 73.0 %    Lymph% 29.1 18.0 - 48.0 %    Mono% 11.9 4.0 - 15.0 %    Eosinophil% 0.9 0.0 - 8.0 %    Basophil% 0.0 0.0 - 1.9 %    Differential Method Automated    Urinalysis    Collection Time: 01/07/18  4:26 AM   Result Value Ref Range    Specimen UA Urine, Clean Catch     Color, UA Samira Yellow, Straw, Samira    Appearance, UA Hazy (A) Clear    pH, UA 5.0 5.0 - 8.0    Specific Gravity, UA 1.025 1.005 - 1.030    Protein, UA Negative Negative    Glucose, UA Negative Negative    Ketones, UA Negative Negative    Bilirubin (UA) 2+ (A) Negative    Occult Blood UA Negative Negative    Nitrite, UA Negative Negative    Urobilinogen, UA 1.0 <2.0 EU/dL    Leukocytes, UA Negative Negative         Diagnostic Results:  I have reviewed all pertinent imaging results/findings within the past 24 hours.     CXR 1/7/2018    Right-sided PICC line with tip projecting over the SVC.  Pacemaker with cardiac leads projecting over the right atrial appendage and right ventricle.  Surgical clips project over the cardiac silhouette in keeping with previous CABG.  Additional surgical clips project over the right apical.     Mediastinal structures are midline the cardiac silhouette is normal in size.  Lung volumes are symmetric.  No focal consolidation.  No pneumothorax or pleural effusions.  No free air beneath the diaphragm.  Degenerative changes of the spine and shoulders noted.  There is scattered gas within slightly prominent loops of colon within the upper abdomen.

## 2018-01-07 NOTE — PLAN OF CARE
Problem: Patient Care Overview  Goal: Plan of Care Review  Outcome: Ongoing (interventions implemented as appropriate)  Pt will be NPO @ [facility] for ERCP tomorrow. Seen by ID, Hem/Onc, Speech, and AES. Diet advanced to full liquid. Family at bedside, attentive to patient.

## 2018-01-07 NOTE — H&P
[facility] Hospital Medicine  History & Physical    Patient Name: Hal Sultana  MRN: [id number]  Admission Date: 1/7/2018  Attending Physician: Levar Rosado MD   Primary Care Provider: Kaycee Ortega MD    [facility] Medicine Team: [facility] MED 4 Michoacano Choe MD     Patient information was obtained from patient, relative(s) and ER records.     Subjective:     Principal Problem:Obstructive jaundice    Chief Complaint: Obstructive Jaundice      HPI: This is Mr. Hal Sultana, 87 year old male with PMHx is significant for acquired pancytopenia, aortic aneurysm, stable CAD, esophageal dysmotility, gout, hypertension. Myelodysplastic syndrome. He is a transfer from [place], [place]. He was accepted for transfer by Dr. Sanchez from [facility] for possible intervention for obstructive jaundice.     He presented to ID clinic on January 1st, 2018 with worsening jaundice and T. Bili reached 19. He was discharged from the same hospital on 12/29/2017 after admission for pruritus and jaundice, underwent MRCP was inconclusive because of motion artifact. Plan to do ERCP; however not done because of improvement in his LFTs. During his December stay, where he initially presented with abdominal bloating and weight loss, he was found to have Staphylococcus sciuri bacteremia which was treated with Doxycycline and Daptomycin (with plan to end date on 2/2018 and intention to complete the therapy on with home infusion care). He underwent EGD and biopsy on 12/13/2017 with result showed no evidence of tumor or dysplasia. He also found to have severe duodenal stricture with plan for intervention (AES versus surgically).    For the January 2018 admission, he underwent different investigation for his worsening LFTs. CT abdomen on 1/3/2018 showed interval development of intrahepatic biliary ductal dilation with continued dilatation of CBD, no pancreas lesion. On 1/3/2018 he underwent ERCP which showed  Tight duodenal stricture in the post bulbar area with inability to get the scope to pass the stricture with concern this could be a malignant duodenal lesion. On 1/5/2018, he underwent percutaneous biliary drain despite multiple phases, and possibly due to intrahepatic duct dilation made the procedure difficult. On 1/5/2018 he underwent Based on labs for the last couple of days were T. Bili 19 and Direct Bili 14, Alk Phosph 469 ALT 52 AST 50. Renal function panel is within normal range. I reviewed medical charts from [facility] and summarize his charts.     Past Medical History:   Diagnosis Date    AAA (abdominal aortic aneurysm)     BPH (benign prostatic hyperplasia)     Duodenal stricture     Hypertension        Past Surgical History:   Procedure Laterality Date    ABDOMINAL AORTIC ANEURYSM REPAIR      CORONARY ARTERY BYPASS GRAFT      ERCP      ESOPHAGOGASTRODUODENOSCOPY  12/13/2017       Review of patient's allergies indicates:   Allergen Reactions    Avelox [moxifloxacin]     Darvocet a500 [propoxyphene n-acetaminophen]     Ibudone [hydrocodone-ibuprofen]     Levaquin [levofloxacin]        No current facility-administered medications on file prior to encounter.      No current outpatient prescriptions on file prior to encounter.     Family History     None        Social History Main Topics    Smoking status: Current Every Day Smoker     Types: Cigarettes    Smokeless tobacco: Not on file    Alcohol use No    Drug use: No    Sexual activity: Not Currently     Review of Systems   Constitutional: Negative for activity change, appetite change, chills, diaphoresis, fatigue and fever.   HENT: Negative for dental problem and drooling.    Respiratory: Negative for cough, choking, shortness of breath, wheezing and stridor.    Cardiovascular: Negative for chest pain, palpitations and leg swelling.   Gastrointestinal: Negative for abdominal distention, abdominal pain, constipation and  diarrhea.   Genitourinary: Negative for difficulty urinating, dysuria and hematuria.   Musculoskeletal: Negative for arthralgias and back pain.   Neurological: Negative for weakness.   Psychiatric/Behavioral: Negative for agitation and behavioral problems.     Objective:     Vital Signs (Most Recent):  Temp: 97.6 °F (36.4 °C) (01/06/18 2320)  Pulse: 75 (01/06/18 2320)  Resp: 20 (01/06/18 2320)  BP: 135/64 (01/06/18 2320)  SpO2: 97 % (01/06/18 2320) Vital Signs (24h Range):  Temp:  [97.6 °F (36.4 °C)] 97.6 °F (36.4 °C)  Pulse:  [75] 75  Resp:  [20] 20  SpO2:  [97 %] 97 %  BP: (135)/(64) 135/64     Weight: 67.9 kg (149 lb 11.1 oz)  Body mass index is 22.11 kg/m².    Physical Exam   Constitutional: He is oriented to person, place, and time. He appears well-developed and severly jaundiced . No distress.   HENT:   Head: Normocephalic and atraumatic.   Mouth/Throat: No oropharyngeal exudate.   Cardiovascular: Normal rate and regular rhythm.  Exam reveals no friction rub.    No murmur heard.  Pulmonary/Chest: Effort normal and breath sounds normal. Left lung has some rhonchi. No respiratory distress. He has no wheezes.   Abdominal: Soft. Bowel sounds are normal. He exhibits distension. There is no tenderness. There is no guarding.   Musculoskeletal: Normal range of motion. He exhibits no edema or deformity.   Neurological: He is alert and oriented to person, place, and time. No cranial nerve deficit. Coordination normal.   Skin: He is not diaphoretic. There is pallor.   Vitals reviewed.          Significant Labs: All pertinent labs within the past 24 hours have been reviewed.    Significant Imaging: I have reviewed and interpreted all pertinent imaging results/findings within the past 24 hours.    Assessment/Plan:     * Obstructive jaundice    - Extensive workup at [facility]. Unsuccessful ERCP and Percutaneous biliary drain   - Based on CT abdomen, ERCP at [facility] showed severe duodenal stricture and unable  to pass the scope through it   - Need consult AES for possible ERCP and Duodenal stent placement.  - NPO midnight, no Heparin product, only on mechanical DVT PPx.         Duodenal stricture    - Unclear etiology for why he had duodenal stricture, underwent biopsy showed normal finding, other etiology could be pancreatic mass compressing the duodenal   - Underwent ERCP at [facility] with no successful attempt to pass through the stricture   - Consult AES for possible intervention (duodenal stent)   - No signs of GI obstruction, no nausea or vomiting, and he is having bowel movement   - He might need CT abdomen with pancreatic protocol to assess his pancreas, defer to day team         Staph bacteriemia     -  On Decemebr 2017 admission found to have Staphylococcus sciuri bactremia   - Was treated with Doxycycline and Daptomycin (with plan to end date on 2/2018 and intention to complete the therapy on with home infusion care).  - Continue on it after checking CPK, ID consult for their recommendations         CAD (coronary artery disease)    - Remote history of CAD and CABG  - Currently stable, no chest pain, on Aspirin and Statin, will continue         Essential hypertension    - On Amlodipine 5 mg and Sotalol 40 mg PO BID   - Stable blood pressure will continue home medications          Aortic aneurysm    - Remote history and underwent repair         Esophageal dysmotilities    - Underwent EGD, and no anatomic abnormalities in EGD   - Stable on Pantoprazole for symptomatic GERD         Benign prostatic hyperplasia with lower urinary tract symptoms    - Stable on Flomax for BPH, will continue           VTE Risk Mitigation         Ordered     Medium Risk of VTE  Once      01/06/18 2339     Place SANGITA hose  Until discontinued      01/06/18 2339     Place sequential compression device  Until discontinued      01/06/18 2339          Michoacano Choe MD  Department of Hospital Medicine   [facility]  [place]

## 2018-01-07 NOTE — ASSESSMENT & PLAN NOTE
- Extensive workup at [facility]. Unsuccessful ERCP and Percutaneous biliary drain   - Based on CT abdomen, ERCP at [facility] showed severe duodenal stricture and unable to pass the scope through it   - Need consult AES for possible ERCP and Duodenal stent placement.  - NPO midnight, no Heparin product, only on mechanical DVT PPx.

## 2018-01-08 ENCOUNTER — SURGERY (OUTPATIENT)
Age: 83
End: 2018-01-08

## 2018-01-08 ENCOUNTER — ANESTHESIA EVENT (OUTPATIENT)
Dept: ENDOSCOPY | Facility: HOSPITAL | Age: 83
DRG: 374 | End: 2018-01-08
Payer: MEDICARE

## 2018-01-08 ENCOUNTER — ANESTHESIA (OUTPATIENT)
Dept: ENDOSCOPY | Facility: HOSPITAL | Age: 83
DRG: 374 | End: 2018-01-08
Payer: MEDICARE

## 2018-01-08 LAB
ABO + RH BLD: NORMAL
ALBUMIN SERPL BCP-MCNC: 2 G/DL
ALP SERPL-CCNC: 383 U/L
ALT SERPL W/O P-5'-P-CCNC: 49 U/L
ANION GAP SERPL CALC-SCNC: 9 MMOL/L
AST SERPL-CCNC: 56 U/L
BASOPHILS # BLD AUTO: 0.01 K/UL
BASOPHILS NFR BLD: 0.3 %
BILIRUB SERPL-MCNC: 29.8 MG/DL
BLD GP AB SCN CELLS X3 SERPL QL: NORMAL
BLD PROD TYP BPU: NORMAL
BLOOD UNIT EXPIRATION DATE: NORMAL
BLOOD UNIT TYPE CODE: 6200
BLOOD UNIT TYPE: NORMAL
BUN SERPL-MCNC: 18 MG/DL
CALCIUM SERPL-MCNC: 8.8 MG/DL
CHLORIDE SERPL-SCNC: 108 MMOL/L
CO2 SERPL-SCNC: 21 MMOL/L
CODING SYSTEM: NORMAL
CREAT SERPL-MCNC: 1.2 MG/DL
DIFFERENTIAL METHOD: ABNORMAL
DISPENSE STATUS: NORMAL
EOSINOPHIL # BLD AUTO: 0 K/UL
EOSINOPHIL NFR BLD: 0.9 %
ERYTHROCYTE [DISTWIDTH] IN BLOOD BY AUTOMATED COUNT: 22.1 %
EST. GFR  (AFRICAN AMERICAN): >60 ML/MIN/1.73 M^2
EST. GFR  (NON AFRICAN AMERICAN): 54 ML/MIN/1.73 M^2
GLUCOSE SERPL-MCNC: 91 MG/DL
HCT VFR BLD AUTO: 21.6 %
HGB BLD-MCNC: 7.3 G/DL
IMM GRANULOCYTES # BLD AUTO: 0.14 K/UL
IMM GRANULOCYTES NFR BLD AUTO: 4.4 %
INR PPP: 1.2
LYMPHOCYTES # BLD AUTO: 0.7 K/UL
LYMPHOCYTES NFR BLD: 22.1 %
MAGNESIUM SERPL-MCNC: 1.8 MG/DL
MCH RBC QN AUTO: 33.3 PG
MCHC RBC AUTO-ENTMCNC: 33.8 G/DL
MCV RBC AUTO: 99 FL
MONOCYTES # BLD AUTO: 0.4 K/UL
MONOCYTES NFR BLD: 12 %
NEUTROPHILS # BLD AUTO: 1.9 K/UL
NEUTROPHILS NFR BLD: 60.3 %
NRBC BLD-RTO: 0 /100 WBC
PHOSPHATE SERPL-MCNC: 1.5 MG/DL
PLATELET # BLD AUTO: 97 K/UL
PMV BLD AUTO: 10.8 FL
POTASSIUM SERPL-SCNC: 3.8 MMOL/L
PROT SERPL-MCNC: 5.3 G/DL
PROTHROMBIN TIME: 12.9 SEC
RBC # BLD AUTO: 2.19 M/UL
SODIUM SERPL-SCNC: 138 MMOL/L
TRANS ERYTHROCYTES VOL PATIENT: NORMAL ML
WBC # BLD AUTO: 3.17 K/UL

## 2018-01-08 PROCEDURE — 97802 MEDICAL NUTRITION INDIV IN: CPT

## 2018-01-08 PROCEDURE — 25000003 PHARM REV CODE 250: Performed by: STUDENT IN AN ORGANIZED HEALTH CARE EDUCATION/TRAINING PROGRAM

## 2018-01-08 PROCEDURE — 63600175 PHARM REV CODE 636 W HCPCS: Performed by: NURSE ANESTHETIST, CERTIFIED REGISTERED

## 2018-01-08 PROCEDURE — C1726 CATH, BAL DIL, NON-VASCULAR: HCPCS | Performed by: INTERNAL MEDICINE

## 2018-01-08 PROCEDURE — 99232 SBSQ HOSP IP/OBS MODERATE 35: CPT | Mod: GC,,, | Performed by: HOSPITALIST

## 2018-01-08 PROCEDURE — D9220A PRA ANESTHESIA: Mod: CRNA,,, | Performed by: NURSE ANESTHETIST, CERTIFIED REGISTERED

## 2018-01-08 PROCEDURE — 27202059 HC NEEDLE, FNA (ANY): Performed by: INTERNAL MEDICINE

## 2018-01-08 PROCEDURE — 0D798ZZ DILATION OF DUODENUM, VIA NATURAL OR ARTIFICIAL OPENING ENDOSCOPIC: ICD-10-PCS | Performed by: INTERNAL MEDICINE

## 2018-01-08 PROCEDURE — 43242 EGD US FINE NEEDLE BX/ASPIR: CPT | Performed by: INTERNAL MEDICINE

## 2018-01-08 PROCEDURE — 43245 EGD DILATE STRICTURE: CPT | Mod: 59,,, | Performed by: INTERNAL MEDICINE

## 2018-01-08 PROCEDURE — 88173 CYTOPATH EVAL FNA REPORT: CPT | Mod: 26,,, | Performed by: PATHOLOGY

## 2018-01-08 PROCEDURE — 86901 BLOOD TYPING SEROLOGIC RH(D): CPT

## 2018-01-08 PROCEDURE — 88305 TISSUE EXAM BY PATHOLOGIST: CPT | Performed by: PATHOLOGY

## 2018-01-08 PROCEDURE — 63600175 PHARM REV CODE 636 W HCPCS: Mod: JG | Performed by: STUDENT IN AN ORGANIZED HEALTH CARE EDUCATION/TRAINING PROGRAM

## 2018-01-08 PROCEDURE — 25000003 PHARM REV CODE 250: Performed by: NURSE ANESTHETIST, CERTIFIED REGISTERED

## 2018-01-08 PROCEDURE — 36430 TRANSFUSION BLD/BLD COMPNT: CPT

## 2018-01-08 PROCEDURE — 43245 EGD DILATE STRICTURE: CPT | Performed by: INTERNAL MEDICINE

## 2018-01-08 PROCEDURE — C9113 INJ PANTOPRAZOLE SODIUM, VIA: HCPCS | Performed by: STUDENT IN AN ORGANIZED HEALTH CARE EDUCATION/TRAINING PROGRAM

## 2018-01-08 PROCEDURE — P9021 RED BLOOD CELLS UNIT: HCPCS

## 2018-01-08 PROCEDURE — D9220A PRA ANESTHESIA: Mod: ANES,,, | Performed by: ANESTHESIOLOGY

## 2018-01-08 PROCEDURE — 20600001 HC STEP DOWN PRIVATE ROOM

## 2018-01-08 PROCEDURE — 88305 TISSUE EXAM BY PATHOLOGIST: CPT | Mod: 26,,, | Performed by: PATHOLOGY

## 2018-01-08 PROCEDURE — 0DB98ZX EXCISION OF DUODENUM, VIA NATURAL OR ARTIFICIAL OPENING ENDOSCOPIC, DIAGNOSTIC: ICD-10-PCS | Performed by: INTERNAL MEDICINE

## 2018-01-08 PROCEDURE — 37000008 HC ANESTHESIA 1ST 15 MINUTES: Performed by: INTERNAL MEDICINE

## 2018-01-08 PROCEDURE — 25000003 PHARM REV CODE 250: Performed by: HOSPITALIST

## 2018-01-08 PROCEDURE — 43242 EGD US FINE NEEDLE BX/ASPIR: CPT | Mod: ,,, | Performed by: INTERNAL MEDICINE

## 2018-01-08 PROCEDURE — 83735 ASSAY OF MAGNESIUM: CPT

## 2018-01-08 PROCEDURE — 85025 COMPLETE CBC W/AUTO DIFF WBC: CPT

## 2018-01-08 PROCEDURE — 80053 COMPREHEN METABOLIC PANEL: CPT

## 2018-01-08 PROCEDURE — 37000009 HC ANESTHESIA EA ADD 15 MINS: Performed by: INTERNAL MEDICINE

## 2018-01-08 PROCEDURE — 85610 PROTHROMBIN TIME: CPT

## 2018-01-08 PROCEDURE — A4216 STERILE WATER/SALINE, 10 ML: HCPCS | Performed by: NURSE ANESTHETIST, CERTIFIED REGISTERED

## 2018-01-08 PROCEDURE — 88172 CYTP DX EVAL FNA 1ST EA SITE: CPT | Performed by: PATHOLOGY

## 2018-01-08 PROCEDURE — 88172 CYTP DX EVAL FNA 1ST EA SITE: CPT | Mod: 26,,, | Performed by: PATHOLOGY

## 2018-01-08 PROCEDURE — 63600175 PHARM REV CODE 636 W HCPCS: Performed by: STUDENT IN AN ORGANIZED HEALTH CARE EDUCATION/TRAINING PROGRAM

## 2018-01-08 PROCEDURE — 84100 ASSAY OF PHOSPHORUS: CPT

## 2018-01-08 PROCEDURE — 86920 COMPATIBILITY TEST SPIN: CPT

## 2018-01-08 RX ORDER — POLYETHYLENE GLYCOL 3350 17 G/17G
17 POWDER, FOR SOLUTION ORAL DAILY PRN
COMMUNITY

## 2018-01-08 RX ORDER — TAMSULOSIN HYDROCHLORIDE 0.4 MG/1
0.4 CAPSULE ORAL NIGHTLY
COMMUNITY

## 2018-01-08 RX ORDER — PRAVASTATIN SODIUM 20 MG/1
20 TABLET ORAL NIGHTLY
Status: ON HOLD | COMMUNITY
End: 2018-01-11 | Stop reason: HOSPADM

## 2018-01-08 RX ORDER — HYDROCODONE BITARTRATE AND ACETAMINOPHEN 500; 5 MG/1; MG/1
TABLET ORAL
Status: DISCONTINUED | OUTPATIENT
Start: 2018-01-08 | End: 2018-01-09

## 2018-01-08 RX ORDER — LIDOCAINE HCL/PF 100 MG/5ML
SYRINGE (ML) INTRAVENOUS
Status: DISCONTINUED | OUTPATIENT
Start: 2018-01-08 | End: 2018-01-08

## 2018-01-08 RX ORDER — PHENYLEPHRINE HYDROCHLORIDE 10 MG/ML
INJECTION INTRAVENOUS
Status: DISCONTINUED | OUTPATIENT
Start: 2018-01-08 | End: 2018-01-08

## 2018-01-08 RX ORDER — SODIUM CHLORIDE 9 MG/ML
INJECTION, SOLUTION INTRAVENOUS CONTINUOUS PRN
Status: DISCONTINUED | OUTPATIENT
Start: 2018-01-08 | End: 2018-01-08

## 2018-01-08 RX ORDER — FLUTICASONE PROPIONATE 50 MCG
2 SPRAY, SUSPENSION (ML) NASAL DAILY PRN
COMMUNITY

## 2018-01-08 RX ORDER — MONTELUKAST SODIUM 10 MG/1
10 TABLET ORAL NIGHTLY
COMMUNITY

## 2018-01-08 RX ORDER — PANTOPRAZOLE SODIUM 40 MG/10ML
80 INJECTION, POWDER, LYOPHILIZED, FOR SOLUTION INTRAVENOUS ONCE
Status: COMPLETED | OUTPATIENT
Start: 2018-01-08 | End: 2018-01-08

## 2018-01-08 RX ORDER — ALBUTEROL SULFATE 2 MG/5ML
2.5 SYRUP ORAL EVERY 4 HOURS PRN
COMMUNITY

## 2018-01-08 RX ORDER — NITROGLYCERIN 400 UG/1
1 SPRAY ORAL EVERY 5 MIN PRN
COMMUNITY

## 2018-01-08 RX ORDER — HYDROCODONE BITARTRATE AND ACETAMINOPHEN 500; 5 MG/1; MG/1
TABLET ORAL
Status: DISCONTINUED | OUTPATIENT
Start: 2018-01-09 | End: 2018-01-09

## 2018-01-08 RX ORDER — TIOTROPIUM BROMIDE 18 UG/1
18 CAPSULE ORAL; RESPIRATORY (INHALATION) DAILY
COMMUNITY

## 2018-01-08 RX ORDER — AMLODIPINE BESYLATE 5 MG/1
5 TABLET ORAL NIGHTLY
COMMUNITY
End: 2018-01-22

## 2018-01-08 RX ORDER — OMEPRAZOLE 40 MG/1
40 CAPSULE, DELAYED RELEASE ORAL NIGHTLY
COMMUNITY

## 2018-01-08 RX ORDER — PANTOPRAZOLE SODIUM 40 MG/1
40 TABLET, DELAYED RELEASE ORAL
Status: DISCONTINUED | OUTPATIENT
Start: 2018-01-08 | End: 2018-01-09

## 2018-01-08 RX ORDER — TOLTERODINE 4 MG/1
4 CAPSULE, EXTENDED RELEASE ORAL DAILY
Status: ON HOLD | COMMUNITY
End: 2018-01-11 | Stop reason: HOSPADM

## 2018-01-08 RX ORDER — SODIUM CHLORIDE 0.9 % (FLUSH) 0.9 %
3 SYRINGE (ML) INJECTION
Status: DISCONTINUED | OUTPATIENT
Start: 2018-01-08 | End: 2018-01-11

## 2018-01-08 RX ORDER — SOTALOL HYDROCHLORIDE 80 MG/1
40 TABLET ORAL 2 TIMES DAILY
COMMUNITY

## 2018-01-08 RX ORDER — PROPOFOL 10 MG/ML
VIAL (ML) INTRAVENOUS CONTINUOUS PRN
Status: DISCONTINUED | OUTPATIENT
Start: 2018-01-08 | End: 2018-01-08

## 2018-01-08 RX ORDER — PROPOFOL 10 MG/ML
VIAL (ML) INTRAVENOUS
Status: DISCONTINUED | OUTPATIENT
Start: 2018-01-08 | End: 2018-01-08

## 2018-01-08 RX ORDER — FINASTERIDE 5 MG/1
5 TABLET, FILM COATED ORAL EVERY OTHER DAY
COMMUNITY

## 2018-01-08 RX ADMIN — PHENYLEPHRINE HYDROCHLORIDE 200 MCG: 10 INJECTION INTRAVENOUS at 10:01

## 2018-01-08 RX ADMIN — EPHEDRINE SULFATE 15 MG: 50 INJECTION, SOLUTION INTRAMUSCULAR; INTRAVENOUS; SUBCUTANEOUS at 11:01

## 2018-01-08 RX ADMIN — PROPOFOL 10 MG: 10 INJECTION, EMULSION INTRAVENOUS at 10:01

## 2018-01-08 RX ADMIN — LIDOCAINE HYDROCHLORIDE 50 MG: 20 INJECTION, SOLUTION INTRAVENOUS at 10:01

## 2018-01-08 RX ADMIN — POTASSIUM PHOSPHATE, MONOBASIC AND POTASSIUM PHOSPHATE, DIBASIC 30 MMOL: 224; 236 INJECTION, SOLUTION INTRAVENOUS at 08:01

## 2018-01-08 RX ADMIN — EPHEDRINE SULFATE 5 MG: 50 INJECTION, SOLUTION INTRAMUSCULAR; INTRAVENOUS; SUBCUTANEOUS at 10:01

## 2018-01-08 RX ADMIN — PANTOPRAZOLE SODIUM 80 MG: 40 INJECTION, POWDER, FOR SOLUTION INTRAVENOUS at 11:01

## 2018-01-08 RX ADMIN — DIPHENHYDRAMINE HYDROCHLORIDE 25 MG: 25 CAPSULE ORAL at 08:01

## 2018-01-08 RX ADMIN — PROPOFOL 100 MCG/KG/MIN: 10 INJECTION, EMULSION INTRAVENOUS at 10:01

## 2018-01-08 RX ADMIN — ASPIRIN 81 MG: 81 TABLET, COATED ORAL at 08:01

## 2018-01-08 RX ADMIN — PHENYLEPHRINE HYDROCHLORIDE 100 MCG: 10 INJECTION INTRAVENOUS at 10:01

## 2018-01-08 RX ADMIN — DEXMEDETOMIDINE HYDROCHLORIDE 0.5 MCG/KG/HR: 100 INJECTION, SOLUTION, CONCENTRATE INTRAVENOUS at 11:01

## 2018-01-08 RX ADMIN — DAPTOMYCIN 500 MG: 500 INJECTION, POWDER, LYOPHILIZED, FOR SOLUTION INTRAVENOUS at 01:01

## 2018-01-08 RX ADMIN — MONTELUKAST SODIUM 10 MG: 10 TABLET, FILM COATED ORAL at 08:01

## 2018-01-08 RX ADMIN — PANTOPRAZOLE SODIUM 40 MG: 40 TABLET, DELAYED RELEASE ORAL at 05:01

## 2018-01-08 RX ADMIN — PANTOPRAZOLE SODIUM 40 MG: 40 TABLET, DELAYED RELEASE ORAL at 08:01

## 2018-01-08 RX ADMIN — SODIUM CHLORIDE: 0.9 INJECTION, SOLUTION INTRAVENOUS at 09:01

## 2018-01-08 RX ADMIN — EPHEDRINE SULFATE 15 MG: 50 INJECTION, SOLUTION INTRAMUSCULAR; INTRAVENOUS; SUBCUTANEOUS at 10:01

## 2018-01-08 RX ADMIN — SOTALOL HYDROCHLORIDE 40 MG: 80 TABLET ORAL at 08:01

## 2018-01-08 RX ADMIN — PHENYLEPHRINE HYDROCHLORIDE 200 MCG: 10 INJECTION INTRAVENOUS at 11:01

## 2018-01-08 RX ADMIN — PROPOFOL 20 MG: 10 INJECTION, EMULSION INTRAVENOUS at 10:01

## 2018-01-08 RX ADMIN — TAMSULOSIN HYDROCHLORIDE 0.4 MG: 0.4 CAPSULE ORAL at 08:01

## 2018-01-08 RX ADMIN — AMLODIPINE BESYLATE 5 MG: 5 TABLET ORAL at 08:01

## 2018-01-08 NOTE — PROVATION PATIENT INSTRUCTIONS
Discharge Summary/Instructions after an Endoscopic Procedure  Patient Name: Hal Sultana  Patient MRN: [id number]  Patient YOB: 1930 Monday, January 08, 2018  Lance Sanchez MD  RESTRICTIONS:  During your procedure today, you received medications for sedation.  These   medications may affect your judgment, balance and coordination.  Therefore,   for 24 hours, you have the following restrictions:   - DO NOT drive a car, operate machinery, make legal/financial decisions,   sign important papers or drink alcohol.    ACTIVITY:  The following day: return to full activity including work, except no heavy   lifting, straining or running for 3 days if polyps were removed.  DIET:  Eat and drink normally unless instructed otherwise.     TREATMENT FOR COMMON SIDE EFFECTS:  - Mild abdominal pain, belching, bloating or excessive gas: rest, eat   lightly and use a heating pad.  - Sore Throat: treat with throat lozenges and/or gargle with warm salt   water.  SYMPTOMS TO WATCH FOR AND REPORT TO YOUR PHYSICIAN:  1. Abdominal pain or bloating, other than gas cramps.  2. Chest pain.  3. Back pain.  4. Chills or fever occurring within 24 hours after the procedure.  5. Rectal bleeding, which would show as bright red, maroon, or black stools.   (A tablespoon of blood from the rectum is not serious, especially if   hemorrhoids are present.)  6. Vomiting.  7. Weakness or dizziness.  8. Because air was used during the procedure, expelling large amounts of air   from your rectum or belching is normal.  9. If a bowel prep was taken, you may not have a bowel movement for 1-3   days.  This is normal.  GO DIRECTLY TO THE NEAREST EMERGENCY ROOM IF YOU HAVE ANY OF THE FOLLOWING:      Difficulty breathing  Chills and/or fever over 101 F   Persistent vomiting and/or vomiting blood   Severe abdominal pain   Severe chest pain   Black, tarry stools   Bleeding- more than one tablespoon   Any other symptom or condition that you may feel needs  urgent attention  Your doctor recommends these additional instructions:  If any biopsies were taken, your doctor s clinic will contact you in 1 to 2   weeks with any results.  We are waiting for cytology results.   Your physician has recommended an ERCP today.  For questions, problems or results please call your physician - Lance Sanchez MD at Work:  [phone].  [facility], EMERGENCY ROOM PHONE NUMBER: [phone]  IF A COMPLICATION OR EMERGENCY SITUATION ARISES AND YOU ARE UNABLE TO REACH   YOUR PHYSICIAN - GO DIRECTLY TO THE EMERGENCY ROOM.  Lance Sanchez MD  1/8/2018 11:50:22 AM  This report has been verified and signed electronically.

## 2018-01-08 NOTE — TRANSFER OF CARE
"Anesthesia Transfer of Care Note    Patient: Hal Sultana    Procedure(s) Performed: Procedure(s) (LRB):  ULTRASOUND-ENDOSCOPIC-UPPER (N/A)  ERCP (N/A)    Patient location: [facility]    Anesthesia Type: general    Transport from OR: Transported from OR on 2-3 L/min O2 by NC with adequate spontaneous ventilation    Post pain: adequate analgesia    Post assessment: no apparent anesthetic complications    Post vital signs: stable    Level of consciousness: responds to stimulation and sedated    Nausea/Vomiting: no nausea/vomiting    Complications: none    Transfer of care protocol was followed      Last vitals:   Visit Vitals  BP (!) 91/45 (BP Location: Left arm, Patient Position: Lying)   Pulse 70   Temp 36.6 °C (97.9 °F) (Temporal)   Resp 16   Ht 5' 9" (1.753 m)   Wt 66.5 kg (146 lb 9.7 oz)   SpO2 100%   BMI 21.65 kg/m²     "

## 2018-01-08 NOTE — PLAN OF CARE
Problem: Patient Care Overview  Goal: Plan of Care Review  Outcome: Ongoing (interventions implemented as appropriate)  Patient went for EUS, ERCP today, although ERCP unable to be completed. Patient tolerating well after, vitals WNL. Patient has poor appetite, urine very dark and concentrated. Fall precautions in place. Blood to be given tonight.

## 2018-01-08 NOTE — PLAN OF CARE
Problem: Patient Care Overview  Goal: Plan of Care Review  Outcome: Ongoing (interventions implemented as appropriate)  Pt AAOx4, VSS, afebrile.  Denies c/o pain.  Pt NPO since midnight for ERCP.  Home medications updated from Son-in-law Paddy.  IV cubicin administered during shift.  Fall risk precautions initiated.  Pt in lowest bed position setting, lighting adjusted, pt to wear nonskid socks when ambulating, side rails up x2, urinal at bedside within reach.  Pt remain free from falls during shift.   Call light within reach. Will continue to monitor.

## 2018-01-08 NOTE — PLAN OF CARE
Problem: Patient Care Overview  Goal: Plan of Care Review  Outcome: Ongoing (interventions implemented as appropriate)  Recommendations     1. When medically appropriate, resume full liquid diet. ADAT to regular.   2. Add Boost Plus ONS (strawberry or vanilla) to aid in caloric intake.   3. RD to monitor & follow-up.

## 2018-01-08 NOTE — ASSESSMENT & PLAN NOTE
- Extensive workup at [facility]. Unsuccessful ERCP and Percutaneous biliary drain   - Based on CT abdomen, ERCP at [facility] showed severe duodenal stricture and unable to pass the scope through it   - CA 19-9 >60,000, suggestive of pancreatic or biliary malignancy  - AES consulted and performed EUS & ERCP (1/8/18), which was unsuccessful because were not able to pass the ERCP scope to 2nd portion of the duodenum despite duodenal dilation.  - Biopsy was taken, f/u  - Surgical Oncology consulted, appreciate recs

## 2018-01-08 NOTE — NURSING TRANSFER
Nursing Transfer Note      1/8/2018     Transfer To: 10th floor from [facility]   *    Transfer via stretcher    Transfer with chart, no cardiac monitoring required     Transported by PCT    Medicines sent: none    Chart send with patient: Yes    Notified: VIVIAN Cisse    Patient reassessed at: 1/8/2017, 1155    Upon arrival to floor: patient oriented to room, call bell in reach and bed in lowest position

## 2018-01-08 NOTE — ASSESSMENT & PLAN NOTE
- On Amlodipine 5 mg and Sotalol 40 mg PO BID   - Stable blood pressure, will continue home medications

## 2018-01-08 NOTE — CONSULTS
"  [facility]  Adult Nutrition  Consult Note    SUMMARY     Recommendations    1. When medically appropriate, resume full liquid diet. ADAT to regular.   2. Add Boost Plus ONS (strawberry or vanilla) to aid in caloric intake.   3. RD to monitor & follow-up.    Goals: PO intake >75%  Nutrition Goal Status: new  Communication of RD Recs: reviewed with RN    Reason for Assessment    Reason for Assessment: nurse/nurse practitioner consult  Diagnosis: other (see comments) (Obstructive jaundice)  Relevent Medical History: HTN   Interdisciplinary Rounds: did not attend     General Information Comments: ST recommends regular diet w/ thin liquids. Pt currently NPO for ERCP. Prior to NPO status, pt on full liquid diet & tolerating. Willing to try ONS. Wt loss noted.    Nutrition Discharge Planning: Adequate PO intake    Nutrition Prescription Ordered    Current Diet Order: NPO; was on full liquid w/ adequate PO intake     Nutrition/Diet History    Patient Reported Diet/Restrictions/Preferences: general     Supplemental Drinks or Food Habits: Ensure     Factors Affecting Nutritional Intake: NPO, early satiety, decreased appetite    Labs/Tests/Procedures/Meds    Pertinent Labs Reviewed: reviewed, pertinent  Pertinent Labs Comments: Stable  Pertinent Medications Reviewed: reviewed, pertinent    Physical Findings    Overall Physical Appearance: other (see comments) (MEKHI; pt ARLYN)  Skin: intact    Anthropometrics    Temp: 97.9 °F (36.6 °C)     Height: 5' 9" (175.3 cm)  Weight Method: Bed Scale  Weight: 66.5 kg (146 lb 9.7 oz)    Ideal Body Weight (IBW), Male: 160 lb  % Ideal Body Weight, Male (lb): 91.63 lb     BMI (Calculated): 21.7  BMI Grade: 18.5-24.9 - normal  Weight Loss: unintentional  Usual Body Weight (UBW), k.3 kg     % Usual Body Weight: 92.17  % Weight Change From Usual Weight: -8.02 %    Estimated/Assessed Needs    Weight Used For Calorie Calculations: 66.5 kg (146 lb 9.7 oz)      Energy Calorie " Requirements (kcal): 1663 kcal/d  Energy Need Method: [place] (1.25 PAL)     Weight Used For Protein Calculations: 66.5 kg (146 lb 9.7 oz)  Protein Requirements: 67-80 g/d (1-1.2 g/kg)     Fluid Need Method: RDA Method, other (see comments) (Per MD or 1 mL/kcal)    Assessment and Plan    Inadequate energy intake r/t decreased appetite AEB poor PO intake, wt loss PTA.  Status: New    Monitor and Evaluation    Food and Nutrient Intake: energy intake, food and beverage intake  Food and Nutrient Adminstration: diet order     Physical Activity and Function: nutrition-related ADLs and IADLs  Anthropometric Measurements: weight, weight change  Biochemical Data, Medical Tests and Procedures: lipid profile, inflammatory profile, glucose/endocrine profile, gastrointestinal profile, electrolyte and renal panel  Nutrition-Focused Physical Findings: overall appearance    Nutrition Risk    Level of Risk: other (see comments) (2x/week)    Nutrition Follow-Up    RD Follow-up?: Yes

## 2018-01-08 NOTE — PLAN OF CARE
[facility] p [phone] p [phone] f [phone]    Pt was currently on Daptomycin 430mg q 24hrs IV thru 2/7/18 - CM will relay info to IM4 team.

## 2018-01-08 NOTE — PLAN OF CARE
"CM to bedside - pt at ERCP procedure; pt's son-in-law, Paddy present and provided assessment info. Pt w/ DME in place, lives w/ dgtr & son-in-law. Pt is [facility]/ [facility] p [phone] f [phone] f [phone]; pt receives IV abx thru [facility]. CM contacted [facility] for clarification of order - pt currently on daptomycin 430mg q 24hrs IV thru 2/7/18. CM relayed info to IM4 team during afternoon huddle.       CM provided patient anticipated RONEN which will be update by nursing staff. Patient provided a Blue "My Health Packet" for d/c planning and health tool. Patient verbalized understanding.     01/08/18 1426   Discharge Assessment   Assessment Type Discharge Planning Assessment   Confirmed/corrected address and phone number on facesheet? Yes   Assessment information obtained from? Caregiver;Patient  (pt at ERCP; son-in-law gave info)   Expected Length of Stay (days) 3   Communicated expected length of stay with patient/caregiver yes   Prior to hospitilization cognitive status: Unable to Assess   Prior to hospitalization functional status: Assistive Equipment;Needs Assistance   Current cognitive status: Alert/Oriented   Current Functional Status: Assistive Equipment;Needs Assistance   Facility Arrived From: Transferring Facility/Hospital: [facility]    Lives With child(nallely), adult   Able to Return to Prior Arrangements unable to determine at this time (comments)   Is patient able to care for self after discharge? Unable to determine at this time (comments)   Who are your caregiver(s) and their phone number(s)? dgtr - [place] [phone]   Patient's perception of discharge disposition home health   Readmission Within The Last 30 Days unable to assess   Patient currently being followed by outpatient case management? No   Patient currently receives any other outside agency services? Yes   Name and contact number of agency or person providing outside " services active w/ [facility]; also active w/ [facility] for IV abx p [phone]   Is it the patient/care giver preference to resume care with the current outside agency? Yes   Equipment Currently Used at Home cane, straight;walker, standard;shower chair;bedside commode;CPAP;nebulizer   Do you have any problems affording any of your prescribed medications? No   Is the patient taking medications as prescribed? yes   Does the patient have transportation home? Yes   Transportation Available car;family or friend will provide   Dialysis Name and Scheduled days N/A   Does the patient receive services at the [facility]madin Clinic? No   Discharge Plan A Home with family;Home Health   Discharge Plan B Skilled Nursing Facility   Patient/Family In Agreement With Plan yes

## 2018-01-08 NOTE — SUBJECTIVE & OBJECTIVE
Interval History: Patient with no complaints overnight except that he just feels very weak.  Plan for EUS & ERCP today by AES.     Review of Systems   Constitutional: Positive for appetite change, fatigue and unexpected weight change. Negative for activity change, chills, diaphoresis and fever.   HENT: Negative for trouble swallowing.    Eyes: Negative.    Respiratory: Negative.    Cardiovascular: Negative.    Gastrointestinal: Negative for abdominal distention, abdominal pain, anal bleeding, blood in stool, constipation, diarrhea, nausea, rectal pain and vomiting.   Genitourinary: Negative.    Musculoskeletal: Negative.    Skin: Positive for color change.   Neurological: Negative.           Objective:     Vital Signs (Most Recent):  Temp: 97.6 °F (36.4 °C) (01/08/18 1310)  Pulse: 69 (01/08/18 1310)  Resp: 18 (01/08/18 1310)  BP: (!) 112/57 (01/08/18 1310)  SpO2: 98 % (01/08/18 1310) Vital Signs (24h Range):  Temp:  [97.6 °F (36.4 °C)-98.8 °F (37.1 °C)] 97.6 °F (36.4 °C)  Pulse:  [] 69  Resp:  [16-18] 18  SpO2:  [96 %-100 %] 98 %  BP: ()/(42-68) 112/57     Weight: 66.5 kg (146 lb 9.7 oz)  Body mass index is 21.65 kg/m².    Intake/Output Summary (Last 24 hours) at 01/08/18 1537  Last data filed at 01/08/18 1122   Gross per 24 hour   Intake             1130 ml   Output              125 ml   Net             1005 ml      Physical Exam   Constitutional: He is oriented to person, place, and time. No distress.   Eyes: Scleral icterus is present.   Cardiovascular: Normal rate and regular rhythm.    Pulmonary/Chest: Effort normal. No respiratory distress.   Mild crackles at lung base b/l   Abdominal: Soft. Bowel sounds are normal. He exhibits no distension and no mass. There is no tenderness. There is no rebound and no guarding.   Musculoskeletal: Normal range of motion. He exhibits no edema.   Neurological: He is alert and oriented to person, place, and time.   Skin: He is not diaphoretic.   jaundiced        Significant Labs:  Recent Results (from the past 24 hour(s))   Protime-INR    Collection Time: 01/08/18  4:30 AM   Result Value Ref Range    Prothrombin Time 12.9 (H) 9.0 - 12.5 sec    INR 1.2 0.8 - 1.2   Comprehensive Metabolic Panel (CMP)    Collection Time: 01/08/18  4:30 AM   Result Value Ref Range    Sodium 138 136 - 145 mmol/L    Potassium 3.8 3.5 - 5.1 mmol/L    Chloride 108 95 - 110 mmol/L    CO2 21 (L) 23 - 29 mmol/L    Glucose 91 70 - 110 mg/dL    BUN, Bld 18 8 - 23 mg/dL    Creatinine 1.2 0.5 - 1.4 mg/dL    Calcium 8.8 8.7 - 10.5 mg/dL    Total Protein 5.3 (L) 6.0 - 8.4 g/dL    Albumin 2.0 (L) 3.5 - 5.2 g/dL    Total Bilirubin 29.8 (H) 0.1 - 1.0 mg/dL    Alkaline Phosphatase 383 (H) 55 - 135 U/L    AST 56 (H) 10 - 40 U/L    ALT 49 (H) 10 - 44 U/L    Anion Gap 9 8 - 16 mmol/L    eGFR if African American >60.0 >60 mL/min/1.73 m^2    eGFR if non African American 54.0 (A) >60 mL/min/1.73 m^2   Magnesium    Collection Time: 01/08/18  4:30 AM   Result Value Ref Range    Magnesium 1.8 1.6 - 2.6 mg/dL   Phosphorus    Collection Time: 01/08/18  4:30 AM   Result Value Ref Range    Phosphorus 1.5 (L) 2.7 - 4.5 mg/dL   CBC with Automated Differential    Collection Time: 01/08/18  4:30 AM   Result Value Ref Range    WBC 3.17 (L) 3.90 - 12.70 K/uL    RBC 2.19 (L) 4.60 - 6.20 M/uL    Hemoglobin 7.3 (L) 14.0 - 18.0 g/dL    Hematocrit 21.6 (L) 40.0 - 54.0 %    MCV 99 (H) 82 - 98 fL    MCH 33.3 (H) 27.0 - 31.0 pg    MCHC 33.8 32.0 - 36.0 g/dL    RDW 22.1 (H) 11.5 - 14.5 %    Platelets 97 (L) 150 - 350 K/uL    MPV 10.8 9.2 - 12.9 fL    Immature Granulocytes 4.4 (H) 0.0 - 0.5 %    Gran # 1.9 1.8 - 7.7 K/uL    Immature Grans (Abs) 0.14 (H) 0.00 - 0.04 K/uL    Lymph # 0.7 (L) 1.0 - 4.8 K/uL    Mono # 0.4 0.3 - 1.0 K/uL    Eos # 0.0 0.0 - 0.5 K/uL    Baso # 0.01 0.00 - 0.20 K/uL    nRBC 0 0 /100 WBC    Gran% 60.3 38.0 - 73.0 %    Lymph% 22.1 18.0 - 48.0 %    Mono% 12.0 4.0 - 15.0 %    Eosinophil% 0.9 0.0 - 8.0 %     Basophil% 0.3 0.0 - 1.9 %    Differential Method Automated

## 2018-01-08 NOTE — ANESTHESIA POSTPROCEDURE EVALUATION
"Anesthesia Post Evaluation    Patient: Hal Sultana    Procedure(s) Performed: Procedure(s) (LRB):  ULTRASOUND-ENDOSCOPIC-UPPER (N/A)  ERCP (N/A)    Final Anesthesia Type: general  Patient location during evaluation: PACU  Patient participation: Yes- Able to Participate  Level of consciousness: awake and alert  Post-procedure vital signs: reviewed and stable  Pain management: adequate  Airway patency: patent  PONV status at discharge: No PONV  Anesthetic complications: no      Cardiovascular status: hemodynamically stable  Respiratory status: unassisted, spontaneous ventilation and room air  Hydration status: euvolemic  Follow-up not needed.        Visit Vitals  BP (!) 92/42 (BP Location: Left arm, Patient Position: Lying)   Pulse 70   Temp 36.6 °C (97.9 °F) (Temporal)   Resp 16   Ht 5' 9" (1.753 m)   Wt 66.5 kg (146 lb 9.7 oz)   SpO2 99%   BMI 21.65 kg/m²       Pain/Nikhil Score: Pain Assessment Performed: Yes (1/8/2018 11:45 AM)  Presence of Pain: denies (1/8/2018 11:45 AM)  Nikhil Score: 10 (1/8/2018 11:45 AM)      "

## 2018-01-08 NOTE — PROVATION PATIENT INSTRUCTIONS
Discharge Summary/Instructions after an Endoscopic Procedure  Patient Name: Hal Sultana  Patient MRN: [id number]  Patient YOB: 1930 Monday, January 08, 2018  Lance Sanchez MD  RESTRICTIONS:  During your procedure today, you received medications for sedation.  These   medications may affect your judgment, balance and coordination.  Therefore,   for 24 hours, you have the following restrictions:   - DO NOT drive a car, operate machinery, make legal/financial decisions,   sign important papers or drink alcohol.    ACTIVITY:  The following day: return to full activity including work, except no heavy   lifting, straining or running for 3 days if polyps were removed.  DIET:  Eat and drink normally unless instructed otherwise.     TREATMENT FOR COMMON SIDE EFFECTS:  - Mild abdominal pain, belching, bloating or excessive gas: rest, eat   lightly and use a heating pad.  - Sore Throat: treat with throat lozenges and/or gargle with warm salt   water.  SYMPTOMS TO WATCH FOR AND REPORT TO YOUR PHYSICIAN:  1. Abdominal pain or bloating, other than gas cramps.  2. Chest pain.  3. Back pain.  4. Chills or fever occurring within 24 hours after the procedure.  5. Rectal bleeding, which would show as bright red, maroon, or black stools.   (A tablespoon of blood from the rectum is not serious, especially if   hemorrhoids are present.)  6. Vomiting.  7. Weakness or dizziness.  8. Because air was used during the procedure, expelling large amounts of air   from your rectum or belching is normal.  9. If a bowel prep was taken, you may not have a bowel movement for 1-3   days.  This is normal.  GO DIRECTLY TO THE NEAREST EMERGENCY ROOM IF YOU HAVE ANY OF THE FOLLOWING:      Difficulty breathing  Chills and/or fever over 101 F   Persistent vomiting and/or vomiting blood   Severe abdominal pain   Severe chest pain   Black, tarry stools   Bleeding- more than one tablespoon   Any other symptom or condition that you may feel needs  urgent attention  Your doctor recommends these additional instructions:  If any biopsies were taken, your doctor s clinic will contact you in 1 to 2   weeks with any results.  None  For questions, problems or results please call your physician - Lance Sanchez MD at Work:  [phone].  [facility], EMERGENCY ROOM PHONE NUMBER: [phone]  IF A COMPLICATION OR EMERGENCY SITUATION ARISES AND YOU ARE UNABLE TO REACH   YOUR PHYSICIAN - GO DIRECTLY TO THE EMERGENCY ROOM.  Lance Sanchez MD  1/8/2018 12:05:55 PM  This report has been verified and signed electronically.

## 2018-01-08 NOTE — PROGRESS NOTES
[facility] Hospital Medicine  Progress Note    Patient Name: Hal Sultana  MRN: [id number]  Patient Class: IP- Inpatient   Admission Date: 1/7/2018  Length of Stay: 1 days  Attending Physician: Levar Rosado MD  Primary Care Provider: Kaycee Ortega MD    [facility] Medicine Team: [facility] HOSP MED 4 Sahara Nazario MD    Subjective:     Principal Problem:Obstructive jaundice    HPI:  This is Mr. Hal Sultana, 87 year old male with PMHx is significant for acquired pancytopenia, aortic aneurysm, stable CAD, esophageal dysmotility, gout, hypertension. Myelodysplastic syndrome. He is a transfer from [place], [place]. He was accepted for transfer by Dr. Sanchez from [facility] for possible intervention for obstructive jaundice.     He presented to ID clinic on January 1st, 2018 with worsening jaundice and T. Bili reached 19. He was discharged from the same hospital on 12/29/2017 after admission for pruritus and jaundice, underwent MRCP was inconclusive because of motion artifact. Plan to do ERCP; however not done because of improvement in his LFTs. During his December stay, where he initially presented with abdominal bloating and weight loss, he was found to have Staphylococcus sciuri bacteremia which was treated with Doxycycline and Daptomycin (with plan to end date on 2/2018 and intention to complete the therapy on with home infusion care). He underwent EGD and biopsy on 12/13/2017 with result showed no evidence of tumor or dysplasia. He also found to have severe duodenal stricture with plan for intervention (AES versus surgically).    For the January 2018 admission, he underwent different investigation for his worsening LFTs. CT abdomen on 1/3/2018 showed interval development of intrahepatic biliary ductal dilation with continued dilatation of CBD, no pancreas lesion. On 1/3/2018 he underwent ERCP which showed Tight duodenal stricture in the post bulbar area with inability to get  the scope to pass the stricture with concern this could be a malignant duodenal lesion. On 1/5/2018, he underwent percutaneous biliary drain despite multiple phases, and possibly due to intrahepatic duct dilation made the procedure difficult. On 1/5/2018 he underwent Based on labs for the last couple of days were T. Bili 19 and Direct Bili 14, Alk Phosph 469 ALT 52 AST 50. Renal function panel is within normal range. I reviewed medical charts from [facility] and summarize his charts.     Hospital Course:  Patient was accepted to hospital medicine as a transfer for AES evaluation for a duodenal stricture.  Upon presentation, patient found to be pancytopenic, although this is chronic due to his diagnosis of MDS.  His TBili was also found to be 27.7, which continues to rise. His vitals were stable, but blood cultures were drawn due to recent history of staph bacteremia.  These have been NGTD.  Patient was on daptomycin at home for the bactermia, which was continued throughout his stay.  Patient underwent EUS and ERCP on 1/8/18, but was unsuccessful as GI was unable to pass the ERCP scope to 2nd portion of the duodenum despite duodenal dilation. A biopsy was taken. Recommended consulting surgical oncology.     Interval History: Patient with no complaints overnight except that he just feels very weak.  Plan for EUS & ERCP today by AES.     Review of Systems   Constitutional: Positive for appetite change, fatigue and unexpected weight change. Negative for activity change, chills, diaphoresis and fever.   HENT: Negative for trouble swallowing.    Eyes: Negative.    Respiratory: Negative.    Cardiovascular: Negative.    Gastrointestinal: Negative for abdominal distention, abdominal pain, anal bleeding, blood in stool, constipation, diarrhea, nausea, rectal pain and vomiting.   Genitourinary: Negative.    Musculoskeletal: Negative.    Skin: Positive for color change.   Neurological: Negative.            Objective:     Vital Signs (Most Recent):  Temp: 97.6 °F (36.4 °C) (01/08/18 1310)  Pulse: 69 (01/08/18 1310)  Resp: 18 (01/08/18 1310)  BP: (!) 112/57 (01/08/18 1310)  SpO2: 98 % (01/08/18 1310) Vital Signs (24h Range):  Temp:  [97.6 °F (36.4 °C)-98.8 °F (37.1 °C)] 97.6 °F (36.4 °C)  Pulse:  [] 69  Resp:  [16-18] 18  SpO2:  [96 %-100 %] 98 %  BP: ()/(42-68) 112/57     Weight: 66.5 kg (146 lb 9.7 oz)  Body mass index is 21.65 kg/m².    Intake/Output Summary (Last 24 hours) at 01/08/18 1537  Last data filed at 01/08/18 1122   Gross per 24 hour   Intake             1130 ml   Output              125 ml   Net             1005 ml      Physical Exam   Constitutional: He is oriented to person, place, and time. No distress.   Eyes: Scleral icterus is present.   Cardiovascular: Normal rate and regular rhythm.    Pulmonary/Chest: Effort normal. No respiratory distress.   Mild crackles at lung base b/l   Abdominal: Soft. Bowel sounds are normal. He exhibits no distension and no mass. There is no tenderness. There is no rebound and no guarding.   Musculoskeletal: Normal range of motion. He exhibits no edema.   Neurological: He is alert and oriented to person, place, and time.   Skin: He is not diaphoretic.   jaundiced       Significant Labs:  Recent Results (from the past 24 hour(s))   Protime-INR    Collection Time: 01/08/18  4:30 AM   Result Value Ref Range    Prothrombin Time 12.9 (H) 9.0 - 12.5 sec    INR 1.2 0.8 - 1.2   Comprehensive Metabolic Panel (CMP)    Collection Time: 01/08/18  4:30 AM   Result Value Ref Range    Sodium 138 136 - 145 mmol/L    Potassium 3.8 3.5 - 5.1 mmol/L    Chloride 108 95 - 110 mmol/L    CO2 21 (L) 23 - 29 mmol/L    Glucose 91 70 - 110 mg/dL    BUN, Bld 18 8 - 23 mg/dL    Creatinine 1.2 0.5 - 1.4 mg/dL    Calcium 8.8 8.7 - 10.5 mg/dL    Total Protein 5.3 (L) 6.0 - 8.4 g/dL    Albumin 2.0 (L) 3.5 - 5.2 g/dL    Total Bilirubin 29.8 (H) 0.1 - 1.0 mg/dL    Alkaline Phosphatase 383  (H) 55 - 135 U/L    AST 56 (H) 10 - 40 U/L    ALT 49 (H) 10 - 44 U/L    Anion Gap 9 8 - 16 mmol/L    eGFR if African American >60.0 >60 mL/min/1.73 m^2    eGFR if non African American 54.0 (A) >60 mL/min/1.73 m^2   Magnesium    Collection Time: 01/08/18  4:30 AM   Result Value Ref Range    Magnesium 1.8 1.6 - 2.6 mg/dL   Phosphorus    Collection Time: 01/08/18  4:30 AM   Result Value Ref Range    Phosphorus 1.5 (L) 2.7 - 4.5 mg/dL   CBC with Automated Differential    Collection Time: 01/08/18  4:30 AM   Result Value Ref Range    WBC 3.17 (L) 3.90 - 12.70 K/uL    RBC 2.19 (L) 4.60 - 6.20 M/uL    Hemoglobin 7.3 (L) 14.0 - 18.0 g/dL    Hematocrit 21.6 (L) 40.0 - 54.0 %    MCV 99 (H) 82 - 98 fL    MCH 33.3 (H) 27.0 - 31.0 pg    MCHC 33.8 32.0 - 36.0 g/dL    RDW 22.1 (H) 11.5 - 14.5 %    Platelets 97 (L) 150 - 350 K/uL    MPV 10.8 9.2 - 12.9 fL    Immature Granulocytes 4.4 (H) 0.0 - 0.5 %    Gran # 1.9 1.8 - 7.7 K/uL    Immature Grans (Abs) 0.14 (H) 0.00 - 0.04 K/uL    Lymph # 0.7 (L) 1.0 - 4.8 K/uL    Mono # 0.4 0.3 - 1.0 K/uL    Eos # 0.0 0.0 - 0.5 K/uL    Baso # 0.01 0.00 - 0.20 K/uL    nRBC 0 0 /100 WBC    Gran% 60.3 38.0 - 73.0 %    Lymph% 22.1 18.0 - 48.0 %    Mono% 12.0 4.0 - 15.0 %    Eosinophil% 0.9 0.0 - 8.0 %    Basophil% 0.3 0.0 - 1.9 %    Differential Method Automated            Assessment/Plan:      * Obstructive jaundice    - Extensive workup at [facility]. Unsuccessful ERCP and Percutaneous biliary drain   - Based on CT abdomen, ERCP at [facility] showed severe duodenal stricture and unable to pass the scope through it   - CA 19-9 >60,000, suggestive of pancreatic or biliary malignancy  - AES consulted and performed EUS & ERCP (1/8/18), which was unsuccessful because were not able to pass the ERCP scope to 2nd portion of the duodenum despite duodenal dilation.  - Biopsy was taken, f/u  - Surgical Oncology consulted, appreciate recs          Bacterial infection due to Staphylococcus    - On  Decemebr 2017 admission found to have Staphylococcus sciuri bactremia   - Was treated with Doxycycline and Daptomycin (with plan to end date on 2/2018 and intention to complete the therapy on with home infusion care).  - ID consulted and appreciate recs  - Home infusion center called and pt was on daptomycin 430mg IV with end date scheduled for 2/7/18  - Will continue Daptomycin 500mg IV qday with weekly CPK labs        Duodenal stricture    See obstructive jaundice        Benign prostatic hyperplasia with lower urinary tract symptoms    - Stable on Flomax for BPH, will continue      CAD (coronary artery disease)    - Remote history of CAD and CABG  - Currently stable, no chest pain, on Aspirin and Statin, will continue         Esophageal dysmotilities    - Underwent EGD, and no anatomic abnormalities in EGD   - Stable on Pantoprazole for symptomatic GERD         MDS (myelodysplastic syndrome)    Diagnosed 18 mos ago.  Patient pancytopenic. On Vidaza at home, but hemo onc consulted and recommend holding in setting of additional malignancy workup.   - Will continue to monitor CBCs and transfuse prn          Essential hypertension    - On Amlodipine 5 mg and Sotalol 40 mg PO BID   - Stable blood pressure, will continue home medications            VTE Risk Mitigation         Ordered     Medium Risk of VTE  Once      01/06/18 2339     Place SANGITA hose  Until discontinued      01/06/18 2339     Place sequential compression device  Until discontinued      01/06/18 2339              Sahara Nazario MD  Department of Hospital Medicine   [facility]

## 2018-01-08 NOTE — ANESTHESIA PREPROCEDURE EVALUATION
01/08/2018  Hal Sultana is a 87 y.o., male.  Past Medical History:   Diagnosis Date    AAA (abdominal aortic aneurysm)     BPH (benign prostatic hyperplasia)     Duodenal stricture     Hypertension        Anesthesia Evaluation    I have reviewed the Patient Summary Reports.    I have reviewed the Nursing Notes.   I have reviewed the Medications.     Review of Systems  Anesthesia Hx:  No problems with previous Anesthesia  History of prior surgery of interest to airway management or planning: Previous anesthesia: General Denies Family Hx of Anesthesia complications.   Denies Personal Hx of Anesthesia complications.   Social:  Former Smoker Quit 1980s   Cardiovascular:   Exercise tolerance: good Pacemaker Hypertension CAD   H/o AAA repair   Pulmonary:  Pulmonary Normal    Renal/:  Renal/ Normal     Hepatic/GI:   Obstructive jaundice, duodenal stricture. Denies N/V, tolerating liquids   Neurological:  Neurology Normal    Endocrine:  Endocrine Normal        Physical Exam  General:  Well nourished    Airway/Jaw/Neck:  Airway Findings: Mouth Opening: Normal Tongue: Normal  General Airway Assessment: Adult  Mallampati: II  Improves to I with phonation.  TM Distance: Normal, at least 6 cm  Jaw/Neck Findings:  Neck ROM: Normal ROM      Dental:  Dental Findings: In tact        Mental Status:  Mental Status Findings:  Cooperative, Alert and Oriented       Lab Results   Component Value Date    WBC 3.17 (L) 01/08/2018    HGB 7.3 (L) 01/08/2018    HCT 21.6 (L) 01/08/2018    MCV 99 (H) 01/08/2018    PLT 97 (L) 01/08/2018         Anesthesia Plan  Type of Anesthesia, risks & benefits discussed:  Anesthesia Type:  general  Patient's Preference:   Intra-op Monitoring Plan:   Intra-op Monitoring Plan Comments:   Post Op Pain Control Plan:   Post Op Pain Control Plan Comments:   Induction:   IV  Beta Blocker:          Informed Consent: Patient understands risks and agrees with Anesthesia plan.  Questions answered. Anesthesia consent signed with patient.  ASA Score: 3     Day of Surgery Review of History & Physical:    H&P update referred to the surgeon.         Ready For Surgery From Anesthesia Perspective.

## 2018-01-08 NOTE — ANESTHESIA RELEASE NOTE
"Anesthesia Release from PACU Note    Patient: Hal Sultana    Procedure(s) Performed: Procedure(s) (LRB):  ULTRASOUND-ENDOSCOPIC-UPPER (N/A)  ERCP (N/A)    Anesthesia type: general    Post pain: Adequate analgesia    Post assessment: no apparent anesthetic complications and tolerated procedure well    Last Vitals:   Visit Vitals  BP (!) 92/42 (BP Location: Left arm, Patient Position: Lying)   Pulse 70   Temp 36.6 °C (97.9 °F) (Temporal)   Resp 16   Ht 5' 9" (1.753 m)   Wt 66.5 kg (146 lb 9.7 oz)   SpO2 99%   BMI 21.65 kg/m²       Post vital signs: stable    Level of consciousness: awake and alert     Nausea/Vomiting: no nausea/no vomiting    Complications: none    Airway Patency: patent    Respiratory: unassisted, spontaneous ventilation, room air    Cardiovascular: stable and blood pressure at baseline    Hydration: euvolemic  "

## 2018-01-08 NOTE — HOSPITAL COURSE
Patient was accepted to hospital medicine as a transfer for AES evaluation for a duodenal stricture.  Upon presentation, patient found to be pancytopenic, although this is chronic due to his diagnosis of MDS.  His TBili was also found to be 27.7, which continues to rise. His vitals were stable, but blood cultures were drawn due to recent history of staph bacteremia.  These have been NGTD.  Patient was on daptomycin at home for the bactermia, which was continued throughout his stay.  Patient underwent EUS and ERCP on 1/8/18, but was unsuccessful as GI was unable to pass the ERCP scope to 2nd portion of the duodenum despite duodenal dilation. A biopsy was taken, and surgical oncology was consulted who recommended a CT with contrast pancreas protocol and a PTC by IR.  They are awaiting biopsy results to determine next step in plan. During the night after the ERCP,  patient was transfused 1 unit of pRBCs per AES recs and began to have hematemesis (300 cc total) close to midnight towards end of transfusion. His SBP was in the 90s/50s, HR 80s.  Patient started on protonix IV and repeat CBC showed a Hb 7.2.  Another unit of pRBCs was given with follow up Hb 7.7. Patient's BP improved, and his mentation was never altered.  Towards the end of the 2nd unit, patient had 2 melenic BMs with no changes in BP.  He continued to deny abdominal pain.  On 1/10/18, patient had another melenic BM and was transfused 1 unit of pRBCs for Hb 7.0.  He also underwent duodenal stenting and choledochoduodenostomy placement by AES.  Because the pathology result from the initial biopsy did not report malignancy, AES will repeat biopsy. On 1/11/18, patient's bilirubin started improving, and he reported feeling a little better.  Marinol was started to help with appetite.  On 1/12/18, patient was in better spirits and felt like he had more energy.  He was stable for discharge home and will follow up with heme-onc for pathology results and further  management.

## 2018-01-08 NOTE — ASSESSMENT & PLAN NOTE
Diagnosed 18 mos ago.  Patient pancytopenic. On Vidaza at home, but hemo onc consulted and recommend holding in setting of additional malignancy workup.   - Will continue to monitor CBCs and transfuse prn

## 2018-01-09 PROBLEM — K92.0 HEMATEMESIS: Status: ACTIVE | Noted: 2018-01-09

## 2018-01-09 PROBLEM — K92.1 MELENA: Status: ACTIVE | Noted: 2018-01-09

## 2018-01-09 PROBLEM — N17.9 AKI (ACUTE KIDNEY INJURY): Status: ACTIVE | Noted: 2018-01-09

## 2018-01-09 LAB
AFP SERPL-MCNC: 1 NG/ML
ALBUMIN SERPL BCP-MCNC: 1.9 G/DL
ALP SERPL-CCNC: 321 U/L
ALT SERPL W/O P-5'-P-CCNC: 41 U/L
ANION GAP SERPL CALC-SCNC: 7 MMOL/L
ANISOCYTOSIS BLD QL SMEAR: SLIGHT
AST SERPL-CCNC: 60 U/L
BASO STIPL BLD QL SMEAR: ABNORMAL
BASOPHILS # BLD AUTO: 0.02 K/UL
BASOPHILS NFR BLD: 0.1 %
BASOPHILS NFR BLD: 0.2 %
BASOPHILS NFR BLD: 0.3 %
BILIRUB SERPL-MCNC: 34.6 MG/DL
BUN SERPL-MCNC: 44 MG/DL
CALCIUM SERPL-MCNC: 8 MG/DL
CHLORIDE SERPL-SCNC: 111 MMOL/L
CO2 SERPL-SCNC: 21 MMOL/L
CREAT SERPL-MCNC: 1.7 MG/DL
DIFFERENTIAL METHOD: ABNORMAL
EOSINOPHIL # BLD AUTO: 0 K/UL
EOSINOPHIL # BLD AUTO: 0.1 K/UL
EOSINOPHIL # BLD AUTO: 0.1 K/UL
EOSINOPHIL NFR BLD: 0.2 %
EOSINOPHIL NFR BLD: 0.4 %
EOSINOPHIL NFR BLD: 1.1 %
ERYTHROCYTE [DISTWIDTH] IN BLOOD BY AUTOMATED COUNT: 20.1 %
ERYTHROCYTE [DISTWIDTH] IN BLOOD BY AUTOMATED COUNT: 20.6 %
ERYTHROCYTE [DISTWIDTH] IN BLOOD BY AUTOMATED COUNT: 21.1 %
EST. GFR  (AFRICAN AMERICAN): 41 ML/MIN/1.73 M^2
EST. GFR  (NON AFRICAN AMERICAN): 35.5 ML/MIN/1.73 M^2
GIANT PLATELETS BLD QL SMEAR: PRESENT
GLUCOSE SERPL-MCNC: 131 MG/DL
HCT VFR BLD AUTO: 21.9 %
HCT VFR BLD AUTO: 22.4 %
HCT VFR BLD AUTO: 23.2 %
HGB BLD-MCNC: 7.2 G/DL
HGB BLD-MCNC: 7.5 G/DL
HGB BLD-MCNC: 7.7 G/DL
HYPOCHROMIA BLD QL SMEAR: ABNORMAL
IMM GRANULOCYTES # BLD AUTO: 0.25 K/UL
IMM GRANULOCYTES # BLD AUTO: 0.29 K/UL
IMM GRANULOCYTES # BLD AUTO: 0.43 K/UL
IMM GRANULOCYTES NFR BLD AUTO: 3 %
IMM GRANULOCYTES NFR BLD AUTO: 3.2 %
IMM GRANULOCYTES NFR BLD AUTO: 4 %
LYMPHOCYTES # BLD AUTO: 1 K/UL
LYMPHOCYTES # BLD AUTO: 1.1 K/UL
LYMPHOCYTES # BLD AUTO: 1.3 K/UL
LYMPHOCYTES NFR BLD: 11.5 %
LYMPHOCYTES NFR BLD: 15.4 %
LYMPHOCYTES NFR BLD: 9.5 %
MAGNESIUM SERPL-MCNC: 1.7 MG/DL
MCH RBC QN AUTO: 31.6 PG
MCH RBC QN AUTO: 31.7 PG
MCH RBC QN AUTO: 32.3 PG
MCHC RBC AUTO-ENTMCNC: 32.9 G/DL
MCHC RBC AUTO-ENTMCNC: 33.2 G/DL
MCHC RBC AUTO-ENTMCNC: 33.5 G/DL
MCV RBC AUTO: 95 FL
MCV RBC AUTO: 96 FL
MCV RBC AUTO: 98 FL
MONOCYTES # BLD AUTO: 0.7 K/UL
MONOCYTES # BLD AUTO: 0.8 K/UL
MONOCYTES # BLD AUTO: 0.9 K/UL
MONOCYTES NFR BLD: 11 %
MONOCYTES NFR BLD: 6.6 %
MONOCYTES NFR BLD: 7.9 %
NEUTROPHILS # BLD AUTO: 10.8 K/UL
NEUTROPHILS # BLD AUTO: 4.2 K/UL
NEUTROPHILS # BLD AUTO: 7.4 K/UL
NEUTROPHILS NFR BLD: 68.2 %
NEUTROPHILS NFR BLD: 77.2 %
NEUTROPHILS NFR BLD: 80.2 %
NRBC BLD-RTO: 0 /100 WBC
NRBC BLD-RTO: 0 /100 WBC
NRBC BLD-RTO: 1 /100 WBC
PHOSPHATE SERPL-MCNC: 2.8 MG/DL
PLATELET # BLD AUTO: 121 K/UL
PLATELET # BLD AUTO: 148 K/UL
PLATELET # BLD AUTO: 97 K/UL
PLATELET BLD QL SMEAR: ABNORMAL
PMV BLD AUTO: 10.3 FL
PMV BLD AUTO: 10.8 FL
PMV BLD AUTO: 11.3 FL
POIKILOCYTOSIS BLD QL SMEAR: SLIGHT
POLYCHROMASIA BLD QL SMEAR: ABNORMAL
POTASSIUM SERPL-SCNC: 4.7 MMOL/L
PREALB SERPL-MCNC: 9 MG/DL
PROT SERPL-MCNC: 4.5 G/DL
RBC # BLD AUTO: 2.23 M/UL
RBC # BLD AUTO: 2.37 M/UL
RBC # BLD AUTO: 2.43 M/UL
SODIUM SERPL-SCNC: 139 MMOL/L
TARGETS BLD QL SMEAR: ABNORMAL
WBC # BLD AUTO: 13.42 K/UL
WBC # BLD AUTO: 6.18 K/UL
WBC # BLD AUTO: 9.6 K/UL

## 2018-01-09 PROCEDURE — 97161 PT EVAL LOW COMPLEX 20 MIN: CPT

## 2018-01-09 PROCEDURE — 84100 ASSAY OF PHOSPHORUS: CPT

## 2018-01-09 PROCEDURE — 99233 SBSQ HOSP IP/OBS HIGH 50: CPT | Mod: ,,, | Performed by: INTERNAL MEDICINE

## 2018-01-09 PROCEDURE — 99232 SBSQ HOSP IP/OBS MODERATE 35: CPT | Mod: GC,,, | Performed by: SURGERY

## 2018-01-09 PROCEDURE — 85025 COMPLETE CBC W/AUTO DIFF WBC: CPT | Mod: 91

## 2018-01-09 PROCEDURE — 63600175 PHARM REV CODE 636 W HCPCS: Mod: JG | Performed by: STUDENT IN AN ORGANIZED HEALTH CARE EDUCATION/TRAINING PROGRAM

## 2018-01-09 PROCEDURE — P9021 RED BLOOD CELLS UNIT: HCPCS

## 2018-01-09 PROCEDURE — 63600175 PHARM REV CODE 636 W HCPCS: Performed by: HOSPITALIST

## 2018-01-09 PROCEDURE — 25000003 PHARM REV CODE 250: Performed by: STUDENT IN AN ORGANIZED HEALTH CARE EDUCATION/TRAINING PROGRAM

## 2018-01-09 PROCEDURE — G8987 SELF CARE CURRENT STATUS: HCPCS | Mod: CK

## 2018-01-09 PROCEDURE — G8988 SELF CARE GOAL STATUS: HCPCS | Mod: CI

## 2018-01-09 PROCEDURE — 80053 COMPREHEN METABOLIC PANEL: CPT

## 2018-01-09 PROCEDURE — 20600001 HC STEP DOWN PRIVATE ROOM

## 2018-01-09 PROCEDURE — 63600175 PHARM REV CODE 636 W HCPCS: Mod: JG | Performed by: HOSPITALIST

## 2018-01-09 PROCEDURE — 82105 ALPHA-FETOPROTEIN SERUM: CPT

## 2018-01-09 PROCEDURE — 83735 ASSAY OF MAGNESIUM: CPT

## 2018-01-09 PROCEDURE — 97165 OT EVAL LOW COMPLEX 30 MIN: CPT

## 2018-01-09 PROCEDURE — 25000003 PHARM REV CODE 250: Performed by: INTERNAL MEDICINE

## 2018-01-09 PROCEDURE — 25500020 PHARM REV CODE 255: Performed by: INTERNAL MEDICINE

## 2018-01-09 PROCEDURE — 63600175 PHARM REV CODE 636 W HCPCS: Performed by: INTERNAL MEDICINE

## 2018-01-09 PROCEDURE — 63600175 PHARM REV CODE 636 W HCPCS: Performed by: STUDENT IN AN ORGANIZED HEALTH CARE EDUCATION/TRAINING PROGRAM

## 2018-01-09 PROCEDURE — 84134 ASSAY OF PREALBUMIN: CPT

## 2018-01-09 PROCEDURE — 36430 TRANSFUSION BLD/BLD COMPNT: CPT

## 2018-01-09 PROCEDURE — 97535 SELF CARE MNGMENT TRAINING: CPT

## 2018-01-09 PROCEDURE — 36415 COLL VENOUS BLD VENIPUNCTURE: CPT

## 2018-01-09 PROCEDURE — C9113 INJ PANTOPRAZOLE SODIUM, VIA: HCPCS | Performed by: STUDENT IN AN ORGANIZED HEALTH CARE EDUCATION/TRAINING PROGRAM

## 2018-01-09 PROCEDURE — 97530 THERAPEUTIC ACTIVITIES: CPT

## 2018-01-09 RX ORDER — PANTOPRAZOLE SODIUM 40 MG/10ML
40 INJECTION, POWDER, LYOPHILIZED, FOR SOLUTION INTRAVENOUS 2 TIMES DAILY
Status: DISCONTINUED | OUTPATIENT
Start: 2018-01-09 | End: 2018-01-11

## 2018-01-09 RX ORDER — MAGNESIUM SULFATE HEPTAHYDRATE 40 MG/ML
2 INJECTION, SOLUTION INTRAVENOUS ONCE
Status: COMPLETED | OUTPATIENT
Start: 2018-01-09 | End: 2018-01-09

## 2018-01-09 RX ORDER — CEFTRIAXONE 1 G/1
1 INJECTION, POWDER, FOR SOLUTION INTRAMUSCULAR; INTRAVENOUS
Status: DISCONTINUED | OUTPATIENT
Start: 2018-01-09 | End: 2018-01-09

## 2018-01-09 RX ORDER — SODIUM CHLORIDE 9 MG/ML
INJECTION, SOLUTION INTRAVENOUS CONTINUOUS
Status: DISCONTINUED | OUTPATIENT
Start: 2018-01-09 | End: 2018-01-12 | Stop reason: HOSPADM

## 2018-01-09 RX ORDER — ASPIRIN 81 MG/1
81 TABLET ORAL DAILY
Status: DISCONTINUED | OUTPATIENT
Start: 2018-01-09 | End: 2018-01-09

## 2018-01-09 RX ORDER — CHOLESTYRAMINE 4 G/9G
1 POWDER, FOR SUSPENSION ORAL 3 TIMES DAILY
Status: DISCONTINUED | OUTPATIENT
Start: 2018-01-09 | End: 2018-01-12 | Stop reason: HOSPADM

## 2018-01-09 RX ORDER — HYDROCODONE BITARTRATE AND ACETAMINOPHEN 500; 5 MG/1; MG/1
TABLET ORAL
Status: DISCONTINUED | OUTPATIENT
Start: 2018-01-09 | End: 2018-01-10

## 2018-01-09 RX ORDER — OCTREOTIDE ACETATE 50 UG/ML
50 INJECTION, SOLUTION INTRAVENOUS; SUBCUTANEOUS ONCE
Status: DISCONTINUED | OUTPATIENT
Start: 2018-01-09 | End: 2018-01-09

## 2018-01-09 RX ADMIN — OCTREOTIDE ACETATE 50 MCG/HR: 1000 INJECTION, SOLUTION INTRAVENOUS; SUBCUTANEOUS at 06:01

## 2018-01-09 RX ADMIN — DAPTOMYCIN 500 MG: 500 INJECTION, POWDER, LYOPHILIZED, FOR SOLUTION INTRAVENOUS at 01:01

## 2018-01-09 RX ADMIN — MONTELUKAST SODIUM 10 MG: 10 TABLET, FILM COATED ORAL at 09:01

## 2018-01-09 RX ADMIN — MAGNESIUM SULFATE IN WATER 2 G: 40 INJECTION, SOLUTION INTRAVENOUS at 10:01

## 2018-01-09 RX ADMIN — OCTREOTIDE ACETATE 50 MCG/HR: 1000 INJECTION, SOLUTION INTRAVENOUS; SUBCUTANEOUS at 07:01

## 2018-01-09 RX ADMIN — AMLODIPINE BESYLATE 5 MG: 5 TABLET ORAL at 09:01

## 2018-01-09 RX ADMIN — CEFTRIAXONE SODIUM 1 G: 1 INJECTION, POWDER, FOR SOLUTION INTRAMUSCULAR; INTRAVENOUS at 09:01

## 2018-01-09 RX ADMIN — DIPHENHYDRAMINE HYDROCHLORIDE 25 MG: 25 CAPSULE ORAL at 03:01

## 2018-01-09 RX ADMIN — SODIUM CHLORIDE 500 ML: 0.9 INJECTION, SOLUTION INTRAVENOUS at 12:01

## 2018-01-09 RX ADMIN — PANTOPRAZOLE SODIUM 40 MG: 40 INJECTION, POWDER, FOR SOLUTION INTRAVENOUS at 09:01

## 2018-01-09 RX ADMIN — ALTEPLASE 2 MG: 2.2 INJECTION, POWDER, LYOPHILIZED, FOR SOLUTION INTRAVENOUS at 11:01

## 2018-01-09 RX ADMIN — IOHEXOL 100 ML: 350 INJECTION, SOLUTION INTRAVENOUS at 09:01

## 2018-01-09 RX ADMIN — SODIUM CHLORIDE: 0.9 INJECTION, SOLUTION INTRAVENOUS at 03:01

## 2018-01-09 NOTE — ASSESSMENT & PLAN NOTE
87 year old male with a history of HTN and MDS on who AES is being consulted for obstructive jaundice. His outside Ct, ERCP, and records of failed PTC drained were reviewed.    Recommendations:  -Await path  -Appreciate Surg Onc recommendations.

## 2018-01-09 NOTE — PROGRESS NOTES
Spoke to CT regarding possible time for patient's scan as it had been ordered at 0730. CT informed me that radiologists had not put in a protocol for the patient so he could not be scanned until they returned at 1 pm and reviewed his chart.

## 2018-01-09 NOTE — PLAN OF CARE
Problem: Physical Therapy Goal  Goal: Physical Therapy Goal  Goals to be met by: 18    Patient will increase functional independence with mobility by performin. Sit to stand transfer with Stand-by Assistance  2. Bed to chair transfer with Stand-by Assistance using Rolling Walker  3. Gait  x 100 feet with Stand-by Assistance using Rolling Walker.     Outcome: Ongoing (interventions implemented as appropriate)  Evaluation complete.  Goals established to improve functional mobility.    DC rec's for HHPT    ASHLEY Pal IIIT, PT  2018

## 2018-01-09 NOTE — SUBJECTIVE & OBJECTIVE
Interval History: Patient was transfused 1 unit of pRBCs per AES recs and began to have hematemesis (300 cc total) close to midnight towards end of transfusion.  His SBP was in the 90s/50s, HR 80s.  Patient started on protonix IV and repeat CBC showed a Hb 7.2.  Another unit of pRBCs was given with follow up Hb 7.7. Patient's BP improved, and his mentation was never altered.  Towards the end of the 2nd unit, patient had 2 melenic BMs with no changes in BP. He continues to deny abdominal pain and just reports significant weakness.    Review of Systems   Constitutional: Positive for appetite change, fatigue and unexpected weight change. Negative for activity change, chills, diaphoresis and fever.   HENT: Negative for trouble swallowing.    Eyes: Negative.    Respiratory: Negative.    Cardiovascular: Negative.    Gastrointestinal: Negative for abdominal distention, abdominal pain, anal bleeding, blood in stool, constipation, diarrhea, nausea, rectal pain and vomiting.   Genitourinary: Negative.    Musculoskeletal: Negative.    Skin: Positive for color change.   Neurological: Negative.           Objective:     Vital Signs (Most Recent):  Temp: 98.3 °F (36.8 °C) (01/09/18 1259)  Pulse: 70 (01/09/18 1259)  Resp: 18 (01/09/18 1259)  BP: (!) 113/57 (01/09/18 1259)  SpO2: 98 % (01/09/18 1259) Vital Signs (24h Range):  Temp:  [97.2 °F (36.2 °C)-98.6 °F (37 °C)] 98.3 °F (36.8 °C)  Pulse:  [70-94] 70  Resp:  [15-20] 18  SpO2:  [95 %-98 %] 98 %  BP: ()/(49-74) 113/57     Weight: 66.5 kg (146 lb 9.7 oz)  Body mass index is 21.65 kg/m².    Intake/Output Summary (Last 24 hours) at 01/09/18 1445  Last data filed at 01/09/18 0544   Gross per 24 hour   Intake          1395.92 ml   Output              950 ml   Net           445.92 ml      Physical Exam   Constitutional: He is oriented to person, place, and time. No distress.   Eyes: Scleral icterus is present.   Cardiovascular: Normal rate and regular rhythm.    Pulmonary/Chest:  Effort normal. No respiratory distress.   Mild crackles at lung base b/l   Abdominal: Soft. Bowel sounds are normal. He exhibits no distension and no mass. There is no tenderness. There is no rebound and no guarding.   Musculoskeletal: Normal range of motion. He exhibits no edema.   Neurological: He is alert and oriented to person, place, and time.   Skin: He is not diaphoretic.   jaundiced       Significant Labs:  Recent Results (from the past 24 hour(s))   Prepare RBC 1 Unit    Collection Time: 01/08/18  4:42 PM   Result Value Ref Range    UNIT NUMBER [id number]     PRODUCT CODE N2998G42     DISPENSE STATUS TRANSFUSED     CODING SYSTEM ICFO552     Unit Blood Type Code 6200     Unit Blood Type A POS     Unit Expiration [id number]    Type & Screen    Collection Time: 01/08/18  6:45 PM   Result Value Ref Range    Group & Rh A POS     Indirect Keiko NEG    Prepare RBC 2 Units; Hematemesis    Collection Time: 01/08/18  6:45 PM   Result Value Ref Range    UNIT NUMBER [id number]     PRODUCT CODE C8709X20     DISPENSE STATUS ISSUED     CODING SYSTEM ZXLA970     Unit Blood Type Code 6200     Unit Blood Type A POS     Unit Expiration [id number]     UNIT NUMBER [id number]     PRODUCT CODE S2870V78     DISPENSE STATUS CROSSMATCHED     CODING SYSTEM PHLJ697     Unit Blood Type Code 6200     Unit Blood Type A POS     Unit Expiration [id number]    CBC auto differential    Collection Time: 01/08/18 11:55 PM   Result Value Ref Range    WBC 13.42 (H) 3.90 - 12.70 K/uL    RBC 2.23 (L) 4.60 - 6.20 M/uL    Hemoglobin 7.2 (L) 14.0 - 18.0 g/dL    Hematocrit 21.9 (L) 40.0 - 54.0 %    MCV 98 82 - 98 fL    MCH 32.3 (H) 27.0 - 31.0 pg    MCHC 32.9 32.0 - 36.0 g/dL    RDW 20.6 (H) 11.5 - 14.5 %    Platelets 148 (L) 150 - 350 K/uL    MPV 10.8 9.2 - 12.9 fL    Immature Granulocytes 3.2 (H) 0.0 - 0.5 %    Gran # 10.8 (H) 1.8 - 7.7 K/uL    Immature Grans (Abs) 0.43 (H) 0.00 - 0.04 K/uL    Lymph # 1.3 1.0 - 4.8 K/uL    Mono # 0.9  0.3 - 1.0 K/uL    Eos # 0.1 0.0 - 0.5 K/uL    Baso # 0.02 0.00 - 0.20 K/uL    nRBC 0 0 /100 WBC    Gran% 80.2 (H) 38.0 - 73.0 %    Lymph% 9.5 (L) 18.0 - 48.0 %    Mono% 6.6 4.0 - 15.0 %    Eosinophil% 0.4 0.0 - 8.0 %    Basophil% 0.1 0.0 - 1.9 %    Platelet Estimate Appears normal     Aniso Slight     Poik Slight     Poly Occasional     Hypo Occasional     Target Cells Occasional     Basophilic Stippling Occasional     Large/Giant Platelets Present     Differential Method Automated    Comprehensive Metabolic Panel (CMP)    Collection Time: 01/09/18  7:50 AM   Result Value Ref Range    Sodium 139 136 - 145 mmol/L    Potassium 4.7 3.5 - 5.1 mmol/L    Chloride 111 (H) 95 - 110 mmol/L    CO2 21 (L) 23 - 29 mmol/L    Glucose 131 (H) 70 - 110 mg/dL    BUN, Bld 44 (H) 8 - 23 mg/dL    Creatinine 1.7 (H) 0.5 - 1.4 mg/dL    Calcium 8.0 (L) 8.7 - 10.5 mg/dL    Total Protein 4.5 (L) 6.0 - 8.4 g/dL    Albumin 1.9 (L) 3.5 - 5.2 g/dL    Total Bilirubin 34.6 (H) 0.1 - 1.0 mg/dL    Alkaline Phosphatase 321 (H) 55 - 135 U/L    AST 60 (H) 10 - 40 U/L    ALT 41 10 - 44 U/L    Anion Gap 7 (L) 8 - 16 mmol/L    eGFR if African American 41.0 (A) >60 mL/min/1.73 m^2    eGFR if non African American 35.5 (A) >60 mL/min/1.73 m^2   Magnesium    Collection Time: 01/09/18  7:50 AM   Result Value Ref Range    Magnesium 1.7 1.6 - 2.6 mg/dL   Phosphorus    Collection Time: 01/09/18  7:50 AM   Result Value Ref Range    Phosphorus 2.8 2.7 - 4.5 mg/dL   CBC with Automated Differential    Collection Time: 01/09/18  7:50 AM   Result Value Ref Range    WBC 9.60 3.90 - 12.70 K/uL    RBC 2.43 (L) 4.60 - 6.20 M/uL    Hemoglobin 7.7 (L) 14.0 - 18.0 g/dL    Hematocrit 23.2 (L) 40.0 - 54.0 %    MCV 96 82 - 98 fL    MCH 31.7 (H) 27.0 - 31.0 pg    MCHC 33.2 32.0 - 36.0 g/dL    RDW 20.1 (H) 11.5 - 14.5 %    Platelets 121 (L) 150 - 350 K/uL    MPV 11.3 9.2 - 12.9 fL    Immature Granulocytes 3.0 (H) 0.0 - 0.5 %    Gran # 7.4 1.8 - 7.7 K/uL    Immature Grans (Abs)  0.29 (H) 0.00 - 0.04 K/uL    Lymph # 1.1 1.0 - 4.8 K/uL    Mono # 0.8 0.3 - 1.0 K/uL    Eos # 0.0 0.0 - 0.5 K/uL    Baso # 0.02 0.00 - 0.20 K/uL    nRBC 0 0 /100 WBC    Gran% 77.2 (H) 38.0 - 73.0 %    Lymph% 11.5 (L) 18.0 - 48.0 %    Mono% 7.9 4.0 - 15.0 %    Eosinophil% 0.2 0.0 - 8.0 %    Basophil% 0.2 0.0 - 1.9 %    Differential Method Automated    Prealbumin    Collection Time: 01/09/18  7:50 AM   Result Value Ref Range    Prealbumin 9 (L) 20 - 43 mg/dL   AFP tumor marker    Collection Time: 01/09/18  7:50 AM   Result Value Ref Range    AFP 1.0 0.0 - 8.4 ng/mL

## 2018-01-09 NOTE — PT/OT/SLP EVAL
"Occupational Therapy   Evaluation    Name: Hal Sultana  MRN: [id number]  Admitting Diagnosis:  Obstructive jaundice 1 Day Post-Op    Recommendations:     Discharge Recommendations: home health OT  Discharge Equipment Recommendations:  bath bench, walker, rolling  Barriers to discharge:  None    History:     Occupational Profile:  Living Environment: Pt lives with adult daughter and son in law in [facility] with threshold entrance - pt was driving until recently  Previous level of function: PLOF = independent until a few weeks ago when he began feeling weaker and weaker, needing increased assist at home  Roles and Routines: father - retired   Equipment Owned:  none, raised toilet - ambulated without DME prior to decline  Assistance upon Discharge: 24/7 supervision available at home from daughter and son in law    Past Medical History:   Diagnosis Date    AAA (abdominal aortic aneurysm)     BPH (benign prostatic hyperplasia)     Duodenal stricture     Hypertension        Past Surgical History:   Procedure Laterality Date    ABDOMINAL AORTIC ANEURYSM REPAIR      CORONARY ARTERY BYPASS GRAFT      ERCP      ESOPHAGOGASTRODUODENOSCOPY  12/13/2017       Subjective     Chief Complaint: weakness  "I was doing everything by myself until this"  Patient/Family stated goals: return to PLOF  Communicated with: nurse prior to session.  Pain/Comfort:  · Pain Rating 1: 0/10  · Location - Side 1: Right  · Location 1: flank (drainage site)  · Pain Addressed 1: Distraction, Reposition, Cessation of Activity  · Pain Rating Post-Intervention 1:  (numerical value not given - pain on R side began after sitting EOB)    Objective:     Patient found with: peripheral IV, bed alarm, SCD    General Precautions: Standard, fall   Orthopedic Precautions:N/A   Braces: N/A     Occupational Performance:    Bed Mobility:    · Patient completed Rolling/Turning to Right with modified independence  · Patient completed Scooting/Bridging " with modified independence  · Patient completed Supine to Sit with modified independence  · Patient completed Sit to Supine with modified independence    Functional Mobility/Transfers:  · Patient completed Sit <> Stand Transfer with contact guard assistance  with  rolling walker     Activities of Daily Living:  · Feeding:  set up A to take medications and drink from cup  · Grooming: set up A to wash face with wash cloth while seated EOB  · UB Dressing: set up A to [facility] gown as robe sitting EOB  · LB Dressing: set up A to alem/doff socks sitting EOB - increased time to compelte due to dizziness    Cognitive/Visual Perceptual:  Cognitive/Psychosocial Skills:     -       Oriented to: Person, Place, Time and Situation   -       Follows Commands/attention:Follows two-step commands  -       Communication: clear/fluent  -       Memory: No Deficits noted  -       Safety awareness/insight to disability: intact   -       Mood/Affect/Coping skills/emotional control: Appropriate to situation    Physical Exam:  Balance:    -       Sitting EOB = SBA (static and dynamic); Standing with RW static/dynamic = CGA  Postural examination/scapula alignment:    -       Rounded shoulders  -       Forward head  -       needed cues to raise head   Skin integrity: Visible skin intact  Edema:  None noted  Sensation:    -       Intact  Motor Planning:    -       WFL  Dominant hand:    -       Right  Upper Extremity Range of Motion:     -       Right Upper Extremity: WFL  -       Left Upper Extremity: WFL  Upper Extremity Strength:    -       Right Upper Extremity: WFL  -       Left Upper Extremity: WFL   Strength:    -       Right Upper Extremity: WFL  -       Left Upper Extremity: WFL  Fine Motor Coordination:    -       Intact  Gross motor coordination:   WFL    Patient left HOB elevated with all lines intact, call button in reach and son in law present    AMPA 6 Click:  AMPA Total Score: 17    Treatment & Education:  · Pt  "completed ADLs and func mobility activities for tx session as noted above  · OT discussed safety during ADLs and need for assist/supervision when weak and dizzy - discussed DME needs with pt  · Pt reporting dizziness throughout session once seated up on side of bed - BP taken to be 116/57  · Whiteboard updated  · Pt educated on role of OT and POC    Education:    Assessment:     Hal Sultana is a 87 y.o. male with a medical diagnosis of Obstructive jaundice. Pt was agreeable to evaluation  He presents with the following performance deficits affecting function are weakness, impaired endurance, impaired self care skills, gait instability, impaired functional mobilty, impaired balance, decreased safety awareness, pain, impaired skin.  Pt's dizziness and decreased endurance effected his ability to perform ADLs/mobility independently and safely.  Goals established to assist pt with returning to PLOF regarding ADLs and func mobility.  Pt will benefit from skilled OT services in order to increase his level of safety and independence with ADLs and mobility.  At this time, HHOT is recommended for pt's post acute therapy needs to ensure a safe transition home.  DME recommendations include a RW and tub bench (pt has a raised toilet and is not interested in a TTB).      Rehab Prognosis:  good; patient would benefit from acute skilled OT services to address these deficits and reach maximum level of function.         Clinical Decision Makin.  OT Low:  "Pt evaluation falls under low complexity for evaluation coding due to performance deficits noted in 1-3 areas as stated above and 0 co-morbities affecting current functional status. Data obtained from problem focused assessments. No modifications or assistance was required for completion of evaluation. Only brief occupational profile and history review completed."     Plan:     Patient to be seen 3 x/week to address the above listed problems via self-care/home " management, therapeutic activities, therapeutic exercises  · Plan of Care Expires: 02/07/18  · Plan of Care Reviewed with: patient (son in law)    This Plan of care has been discussed with the patient who was involved in its development and understands and is in agreement with the identified goals and treatment plan    GOALS:    Occupational Therapy Goals        Problem: Occupational Therapy Goal    Goal Priority Disciplines Outcome Interventions   Occupational Therapy Goal     OT, PT/OT Ongoing (interventions implemented as appropriate)    Description:  Goals to be met by: 01/15/18     Patient will increase functional independence with ADLs by performing:    UE Dressing with Modified [place].  LE Dressing with Modified [place].  Grooming while standing with Stand-by Assistance.  Toileting from bedside commode with Stand-by Assistance for hygiene and clothing management.   Toilet transfer to bedside commode with Stand-by Assistance.  Upper extremity exercise program x15 reps per handout, with supervision.                      Time Tracking:     OT Date of Treatment: 01/09/18  OT Start Time: 0853  OT Stop Time: 0915  OT Total Time (min): 22 min    Billable Minutes:Evaluation 12  Self Care/Home Management 10    Juana Jaquez OT  1/9/2018

## 2018-01-09 NOTE — SUBJECTIVE & OBJECTIVE
Subjective:     Interval History: No acute events overnight.  Awaiting path.    Review of Systems   Constitutional: Positive for appetite change, fatigue and unexpected weight change. Negative for activity change, chills, diaphoresis and fever.   HENT: Negative for trouble swallowing.    Eyes: Negative.    Respiratory: Negative.    Cardiovascular: Negative.    Gastrointestinal: Negative for abdominal distention, abdominal pain, anal bleeding, blood in stool, constipation, diarrhea, nausea, rectal pain and vomiting.   Genitourinary: Negative.    Musculoskeletal: Negative.    Skin: Positive for color change.   Neurological: Negative.      Objective:     Vital Signs (Most Recent):  Temp: 97.6 °F (36.4 °C) (01/09/18 1608)  Pulse: 71 (01/09/18 1608)  Resp: 15 (01/09/18 1608)  BP: (!) 126/57 (01/09/18 1608)  SpO2: 95 % (01/09/18 1608) Vital Signs (24h Range):  Temp:  [97.4 °F (36.3 °C)-98.6 °F (37 °C)] 97.6 °F (36.4 °C)  Pulse:  [70-94] 71  Resp:  [15-20] 15  SpO2:  [95 %-98 %] 95 %  BP: ()/(49-74) 126/57     Weight: 66.5 kg (146 lb 9.7 oz) (01/08/18 1115)  Body mass index is 21.65 kg/m².      Intake/Output Summary (Last 24 hours) at 01/09/18 1648  Last data filed at 01/09/18 1400   Gross per 24 hour   Intake          1395.92 ml   Output             1200 ml   Net           195.92 ml       Lines/Drains/Airways     Peripherally Inserted Central Catheter Line                 PICC Double Lumen 12/31/17 right brachial 9 days                Physical Exam   Constitutional: He is oriented to person, place, and time. No distress.   Eyes: Scleral icterus is present.   Cardiovascular: Normal rate and regular rhythm.    Pulmonary/Chest: Effort normal and breath sounds normal.   Abdominal: Soft. Bowel sounds are normal. He exhibits no distension and no mass. There is no tenderness. There is no rebound and no guarding. No hernia.   Musculoskeletal: Normal range of motion. He exhibits no edema.   Neurological: He is alert and  oriented to person, place, and time.   Skin: He is not diaphoretic.       Significant Labs:  CBC:   Recent Labs  Lab 01/08/18  0430 01/08/18  2355 01/09/18  0750   WBC 3.17* 13.42* 9.60   HGB 7.3* 7.2* 7.7*   HCT 21.6* 21.9* 23.2*   PLT 97* 148* 121*     CMP:   Recent Labs  Lab 01/09/18  0750   *   CALCIUM 8.0*   ALBUMIN 1.9*   PROT 4.5*      K 4.7   CO2 21*   *   BUN 44*   CREATININE 1.7*   ALKPHOS 321*   ALT 41   AST 60*   BILITOT 34.6*     Coagulation:   Recent Labs  Lab 01/08/18  0430   INR 1.2         Significant Imaging:  Imaging results within the past 24 hours have been reviewed.

## 2018-01-09 NOTE — PHARMACY MED REC
"Admission Medication Reconciliation - Pharmacy Consult Note    The home medication history was taken by Dana Reynolds Pharmacy Tech.  Based on information gathered and subsequent review by the clinical pharmacist, the items below may need attention.     You may go to "Admission" then "Reconcile Home Medications" tabs to review and/or act upon these items. Based on information gathered and subsequent review by the clinical pharmacist, the items below may need attention.    Potentially problematic discrepancies with current MAR  o Patient IS taking the following which was not ordered upon admit  o Finasteride 5 mg qod  o tamsulosin 0.4 mg qhs  o Spiriva daily    Please address this information as you see fit.  Feel free to contact us if you have any questions or require assistance.    Josiane Buitrago PharmD, [facility]  Internal Medicine Clinical Pharmacy Specialist  Spectra link: [phone]      Patient's prior to admission medication regimen was as follows:  Medication Sig    albuterol 2 mg/5 mL syrup Take 2.5 mg by mouth every 4 (four) hours as needed.    amLODIPine (NORVASC) 5 MG tablet Take 5 mg by mouth every evening.    aspirin 81 MG Chew Take 81 mg by mouth once daily.    finasteride (PROSCAR) 5 mg tablet Take 5 mg by mouth every other day.    fluticasone (FLONASE) 50 mcg/actuation nasal spray 2 sprays by Each Nare route daily as needed for Rhinitis.    loratadine-pseudoephedrine 5-120 mg (CLARITIN-D 12-HOUR) 5-120 mg per tablet Take 1 tablet by mouth 2 (two) times daily as needed for Allergies.    montelukast (SINGULAIR) 10 mg tablet Take 10 mg by mouth every evening.    nitroGLYCERIN 0.4 MG/DOSE TL SPRY (NITROLINGUAL) 400 mcg/spray spray Place 1 spray under the tongue every 5 (five) minutes as needed for Chest pain.    omeprazole (PRILOSEC) 40 MG capsule Take 40 mg by mouth every evening.    polyethylene glycol (GLYCOLAX) 17 gram PwPk Take 17 g by mouth daily as needed (constipation).     pravastatin " (PRAVACHOL) 20 MG tablet Take 20 mg by mouth every evening.    sotalol (BETAPACE) 40 MG tablet Take 40 mg by mouth 2 (two) times daily.    tamsulosin (FLOMAX) 0.4 mg Cp24 Take 0.4 mg by mouth every evening.    tiotropium (SPIRIVA) 18 mcg inhalation capsule Inhale 18 mcg into the lungs once daily. Controller    tolterodine (DETROL LA) 4 MG 24 hr capsule Take 4 mg by mouth once daily.         Please add appropriate    SmartPhrase below:

## 2018-01-09 NOTE — PLAN OF CARE
Spoke with CT who stated they would be sending for patient. Stated that patient had PICC for IV access.

## 2018-01-09 NOTE — PROGRESS NOTES
0530 - pt had incontinent episode of moderate amount dark maroon stool.  Notified Dr. Gallegos. Who stated to continue blood transfusion redraw cbc.  Monitor bp which is currently at his baseline.   0600 - 2nd unit of prbs completed at this time.  got pt up to bedside commode had another large dark maroon loose stool and started to feel lightheaded and dizzy.  Put patient back in bed.  bp normal. Consulted the critical care nurse who came to bedside.  Notified Family Son Pradeep who is staying in the [place] house and he came to room.    0730 - patient had another large maroon liquid stool in bedpan.  Started octreotide gtt and lance labs from picc line.  Updated patient and son pradeep of plan for today including ct of abdomen.

## 2018-01-09 NOTE — SUBJECTIVE & OBJECTIVE
No current facility-administered medications on file prior to encounter.      No current outpatient prescriptions on file prior to encounter.       Review of patient's allergies indicates:   Allergen Reactions    Avelox [moxifloxacin]     Darvocet a500 [propoxyphene n-acetaminophen]     Ibudone [hydrocodone-ibuprofen]     Levaquin [levofloxacin]        Past Medical History:   Diagnosis Date    AAA (abdominal aortic aneurysm)     BPH (benign prostatic hyperplasia)     Duodenal stricture     Hypertension      Past Surgical History:   Procedure Laterality Date    ABDOMINAL AORTIC ANEURYSM REPAIR      CORONARY ARTERY BYPASS GRAFT      ERCP      ESOPHAGOGASTRODUODENOSCOPY  12/13/2017     Family History     Problem Relation (Age of Onset)    No Known Problems Mother, Father        Social History Main Topics    Smoking status: Current Every Day Smoker     Types: Cigarettes    Smokeless tobacco: Not on file    Alcohol use No    Drug use: No    Sexual activity: Not Currently     Review of Systems   Constitutional: Positive for activity change, appetite change, fatigue and fever.   HENT: Negative for congestion and drooling.    Eyes: Negative for pain, redness and itching.   Respiratory: Negative for cough and shortness of breath.    Cardiovascular: Negative for chest pain.   Gastrointestinal: Positive for abdominal distention and nausea. Negative for abdominal pain.   Endocrine: Negative for cold intolerance and heat intolerance.   Genitourinary: Negative for dysuria.   Musculoskeletal: Negative for back pain and gait problem.   Skin: Positive for color change. Negative for wound.   Allergic/Immunologic: Negative for environmental allergies and immunocompromised state.   Neurological: Negative for dizziness.   Hematological: Negative for adenopathy.   Psychiatric/Behavioral: Negative for agitation and confusion.     Objective:     Vital Signs (Most Recent):  Temp: 97.6 °F (36.4 °C) (01/08/18 2058)  Pulse: 70  (01/08/18 2058)  Resp: 18 (01/08/18 2058)  BP: (!) 97/49 (01/08/18 2058)  SpO2: 98 % (01/08/18 1943) Vital Signs (24h Range):  Temp:  [97.2 °F (36.2 °C)-98.5 °F (36.9 °C)] 97.6 °F (36.4 °C)  Pulse:  [] 70  Resp:  [16-18] 18  SpO2:  [96 %-100 %] 98 %  BP: ()/(42-68) 97/49     Weight: 66.5 kg (146 lb 9.7 oz)  Body mass index is 21.65 kg/m².    Physical Exam   Constitutional: He is oriented to person, place, and time. He appears well-developed and well-nourished. No distress.   jaundiced   HENT:   Head: Normocephalic and atraumatic.   Eyes: Conjunctivae are normal. Right eye exhibits no discharge. Left eye exhibits no discharge. Scleral icterus is present.   Neck: Normal range of motion. Neck supple. No JVD present. No tracheal deviation present.   Cardiovascular: Normal rate.    Pulmonary/Chest: Effort normal. No respiratory distress.   Abdominal: Soft. He exhibits no distension. There is no tenderness.   Well healed scar from CABG, open mary, and open AAA repair   Neurological: He is alert and oriented to person, place, and time.   Skin: Skin is warm and dry. No rash noted. He is not diaphoretic. No erythema.       Significant Labs:  CBC:   Recent Labs  Lab 01/08/18 0430   WBC 3.17*   RBC 2.19*   HGB 7.3*   HCT 21.6*   PLT 97*   MCV 99*   MCH 33.3*   MCHC 33.8     CMP:   Recent Labs  Lab 01/08/18 0430   GLU 91   CALCIUM 8.8   ALBUMIN 2.0*   PROT 5.3*      K 3.8   CO2 21*      BUN 18   CREATININE 1.2   ALKPHOS 383*   ALT 49*   AST 56*   BILITOT 29.8*     Coagulation:   Recent Labs  Lab 01/08/18 0430   LABPROT 12.9*   INR 1.2

## 2018-01-09 NOTE — HPI
Mr. Hal Sultana, 87 year old male with PMHx is significant for acquired pancytopenia, aortic aneurysm, stable CAD, esophageal dysmotility, gout, hypertension. Myelodysplastic syndrome. He is a transfer from [place], [place].    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  He returned a few days later with high fevers and was found to have a staff blood stream infection. He was started on antibiotics for this when he first began to notice the onset of jaundice, this was right after ismael. He returned to the hospital was found to have biliary ductal dilation, the ERCP scope was unable to traverse the duodenum, so a PTC was attempted and also unsuccessful in [place]. He was transfered to [facility] for further evaluation,   At [facility] an EUS was preformed and FNA of a mass in the [place]ip[place] duodenal space was done. Dilation of the duodenum was attempted but unsuccessful.

## 2018-01-09 NOTE — ASSESSMENT & PLAN NOTE
Awaiting pathology results  If this is malignancy, will need staging scans,  obtain pancreas protocol  CT to eval for resectability  Will obtain nutrition labs in am, to gauge nutrition levels  May need TPN as part of pre-habbing  Will undergo endoscopic biliary to duodenal stenting, as well as duodenal stenting per GI

## 2018-01-09 NOTE — CONSULTS
[facility]  General Surgery  Consult Note    Patient Name: Hal Sultana  MRN: [id number]  Code Status: Full Code  Admission Date: 1/7/2018  Hospital Length of Stay: 1 days  Attending Physician: Twila Rosado MD  Primary Care Provider: Kaycee Ortega MD    Patient information was obtained from patient, spouse/SO, relative(s), past medical records and ER records.     Inpatient consult to Surgical Oncology  Consult performed by: DALIA REED  Consult ordered by: TWILA ROSADO        Subjective:     Principal Problem: Obstructive jaundice    History of Present Illness: Mr. Hal Sultana, 87 year old male with PMHx is significant for acquired pancytopenia, aortic aneurysm, stable CAD, esophageal dysmotility, gout, hypertension. Myelodysplastic syndrome. He is a transfer from [place], [place].    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  He returned a few days later with high fevers and was found to have a staff blood stream infection. He was started on antibiotics for this when he first began to notice the onset of jaundice, this was right after ismael. He returned to the hospital was found to have biliary ductal dilation, the ERCP scope was unable to traverse the duodenum, so a PTC was attempted and also unsuccessful in [place]. He was transfered to [facility] for further evaluation,   At [facility] an EUS was preformed and FNA of a mass in the [place] duodenal space was done. Dilation of the duodenum was attempted but unsuccessful.     No current facility-administered medications on file prior to encounter.      No current outpatient prescriptions on file prior to encounter.       Review of patient's  allergies indicates:   Allergen Reactions    Avelox [moxifloxacin]     Darvocet a500 [propoxyphene n-acetaminophen]     Ibudone [hydrocodone-ibuprofen]     Levaquin [levofloxacin]        Past Medical History:   Diagnosis Date    AAA (abdominal aortic aneurysm)     BPH (benign prostatic hyperplasia)     Duodenal stricture     Hypertension      Past Surgical History:   Procedure Laterality Date    ABDOMINAL AORTIC ANEURYSM REPAIR      CORONARY ARTERY BYPASS GRAFT      ERCP      ESOPHAGOGASTRODUODENOSCOPY  12/13/2017     Family History     Problem Relation (Age of Onset)    No Known Problems Mother, Father        Social History Main Topics    Smoking status: Current Every Day Smoker     Types: Cigarettes    Smokeless tobacco: Not on file    Alcohol use No    Drug use: No    Sexual activity: Not Currently     Review of Systems   Constitutional: Positive for activity change, appetite change, fatigue and fever.   HENT: Negative for congestion and drooling.    Eyes: Negative for pain, redness and itching.   Respiratory: Negative for cough and shortness of breath.    Cardiovascular: Negative for chest pain.   Gastrointestinal: Positive for abdominal distention and nausea. Negative for abdominal pain.   Endocrine: Negative for cold intolerance and heat intolerance.   Genitourinary: Negative for dysuria.   Musculoskeletal: Negative for back pain and gait problem.   Skin: Positive for color change. Negative for wound.   Allergic/Immunologic: Negative for environmental allergies and immunocompromised state.   Neurological: Negative for dizziness.   Hematological: Negative for adenopathy.   Psychiatric/Behavioral: Negative for agitation and confusion.     Objective:     Vital Signs (Most Recent):  Temp: 97.6 °F (36.4 °C) (01/08/18 2058)  Pulse: 70 (01/08/18 2058)  Resp: 18 (01/08/18 2058)  BP: (!) 97/49 (01/08/18 2058)  SpO2: 98 % (01/08/18 1943) Vital Signs (24h Range):  Temp:  [97.2 °F (36.2 °C)-98.5 °F (36.9  °C)] 97.6 °F (36.4 °C)  Pulse:  [] 70  Resp:  [16-18] 18  SpO2:  [96 %-100 %] 98 %  BP: ()/(42-68) 97/49     Weight: 66.5 kg (146 lb 9.7 oz)  Body mass index is 21.65 kg/m².    Physical Exam   Constitutional: He is oriented to person, place, and time. He appears well-developed and well-nourished. No distress.   jaundiced   HENT:   Head: Normocephalic and atraumatic.   Eyes: Conjunctivae are normal. Right eye exhibits no discharge. Left eye exhibits no discharge. Scleral icterus is present.   Neck: Normal range of motion. Neck supple. No JVD present. No tracheal deviation present.   Cardiovascular: Normal rate.    Pulmonary/Chest: Effort normal. No respiratory distress.   Abdominal: Soft. He exhibits no distension. There is no tenderness.   Well healed scar from CABG, open mary, and open AAA repair   Neurological: He is alert and oriented to person, place, and time.   Skin: Skin is warm and dry. No rash noted. He is not diaphoretic. No erythema.       Significant Labs:  CBC:   Recent Labs  Lab 01/08/18  0430   WBC 3.17*   RBC 2.19*   HGB 7.3*   HCT 21.6*   PLT 97*   MCV 99*   MCH 33.3*   MCHC 33.8     CMP:   Recent Labs  Lab 01/08/18  0430   GLU 91   CALCIUM 8.8   ALBUMIN 2.0*   PROT 5.3*      K 3.8   CO2 21*      BUN 18   CREATININE 1.2   ALKPHOS 383*   ALT 49*   AST 56*   BILITOT 29.8*     Coagulation:   Recent Labs  Lab 01/08/18  0430   LABPROT 12.9*   INR 1.2           Assessment/Plan:     * Obstructive jaundice    Awaiting pathology results  If this is malignancy, will need staging scans,  Would obtain pancreas protocol to eval for resectability  Will obtain nutrition labs in am, to gauge nutrition levels  May need TPN as part of pre-habbing  Would attempt IR PTC in this hospital as ERCP seems unlikely to be able to decompress the biliary system.          VTE Risk Mitigation         Ordered     Medium Risk of VTE  Once      01/06/18 3728     Place SANGITA hose  Until discontinued       01/06/18 2339     Place sequential compression device  Until discontinued      01/06/18 2339          Thank you for your consult. I will follow-up with patient. Please contact us if you have any additional questions.    Dashawn Taylor MD  General Surgery  [facility]

## 2018-01-09 NOTE — PT/OT/SLP EVAL
Physical Therapy Evaluation    Patient Name:  Hal Sultana   MRN:  [id number]    Recommendations:     Discharge Recommendations:  home with home health (24-hr family c are)   Discharge Equipment Recommendations: bedside commode, bath bench, walker, rolling   Barriers to discharge: None    Assessment:     Hal Sultana is a 87 y.o. male admitted with a medical diagnosis of Obstructive jaundice.  He presents with the following impairments/functional limitations:  impaired endurance, impaired functional mobilty, impaired balance, decreased ROM, impaired muscle length. Fair tolerance to PT session. Most limited by dizziness and impaired endurance with sitting EOB and steps EOB. Improved transfers with education for forward lean and appropriate placement. Multiple rest breaks taken secondary to fatigue. Safest to mobilize using rolling walker at this time. To benefit from continued skilled intervention to address deficits. DC rec's for Home with HH therapy services and 24-hr family care.    Rehab Prognosis:  Good; patient would benefit from acute skilled PT services to address these deficits and reach maximum level of function.      Recent Surgery: Procedure(s) (LRB):  ULTRASOUND-ENDOSCOPIC-UPPER (N/A)  ERCP (N/A) 1 Day Post-Op    Plan:     During this hospitalization, patient to be seen 3 x/week to address the above listed problems via gait training, therapeutic activities, therapeutic exercises, neuromuscular re-education  · Plan of Care Expires:  02/09/18   Plan of Care Reviewed with: patient    Subjective     Communicated with RN prior to session.  Patient found supine upon PT entry to room, agreeable to evaluation.      Chief Complaint: impaired endurance; dizziness; R-sided abdominal discomfort  Patient comments/goals: get stronger  Pain/Comfort:  · Pain Rating 1:  (R-sided lower abdominal pain not rated with VAS)  · Pain Rating Post-Intervention 1:  (reports pain at same location; not rated with  VAS)    Patients cultural, spiritual, Alevism conflicts given the current situation: none stated    Living Environment:  Patient lives in 1-story home with 1 threshold step to enter. Regular tub/shower. Prior to 12/1 patient Indep with all mobility including driving; however, has since had functional decline.  walker, standard.  DME owned (not currently used): standard walker.  Upon discharge, patient will have assistance from family with 24-hr care.    Objective:     Patient found with: peripheral IV, bed alarm (family present)     General Precautions: Standard, fall   Orthopedic Precautions:N/A   Braces: N/A     Exams:  · Cognitive Exam:  Patient is oriented to Person, Place, Time and Situation and follows 100% of multi-step commands   · Gross Motor Coordination:  WFL  · Postural Exam:  Patient presented with the following abnormalities: -       Rounded shoulders  · -       Forward head  · Sensation: -       Intact  light/touch BLE  · Skin Integrity/Edema:  -       jaundice  · -       Skin integrity: Visible skin intact  · RLE ROM: WFL except moderatley impaired knee extension ROM  · RLE Strength: WFL  · LLE ROM: WFL except moderately impaired knee extension ROM  · LLE Strength: WFL    Functional Mobility:  · Bed Mobility:  Rolling Left:  modified independence  · Rolling Right: modified independence  · Supine to Sit: modified independence  · Sit to Supine: modified independence  · Transfers:  Sit to Stand:  contact guard assistance with and without rolling walker  · Gait: 2 small steps forward/backward; 3 steps taken lateral towards HOB using rolling walker with CGA  · Balance: no LOB sitting EOB with dynamic movement. No LOB with standing balance using rolling walker    AM-PAC 6 CLICK MOBILITY  Total Score:17       Therapeutic Activities and Exercises:  OT present for co-evaluation.     Patient educated on:   - role of PT/POC    - safety with all functional mobility   - bed mobility training   - transfer  training   - gait training with rolling walker   - importance of participation with therapy services   - safest to transfer with rolling walker and 1 person assist at this time.    Patient and family verbalized understanding of all education provided.     Improved transfers with training and successive attempts.  Vc's for forward lean and BUE hand placement for safety.  Gait training with education for appropriate RW use.    Patient fatigued after transfer/gait training and requested to return to supine position.  Bedside chair not present in room.       Patient left supine with all lines intact, call button in reach, RN notified and OT/family present.    GOALS:    Physical Therapy Goals        Problem: Physical Therapy Goal    Goal Priority Disciplines Outcome Goal Variances Interventions   Physical Therapy Goal     PT/OT, PT Ongoing (interventions implemented as appropriate)     Description:  Goals to be met by: 18    Patient will increase functional independence with mobility by performin. Sit to stand transfer with Stand-by Assistance  2. Bed to chair transfer with Stand-by Assistance using Rolling Walker  3. Gait  x 100 feet with Stand-by Assistance using Rolling Walker.                       History:     Past Medical History:   Diagnosis Date    AAA (abdominal aortic aneurysm)     BPH (benign prostatic hyperplasia)     Duodenal stricture     Hypertension        Past Surgical History:   Procedure Laterality Date    ABDOMINAL AORTIC ANEURYSM REPAIR      CORONARY ARTERY BYPASS GRAFT      ERCP      ESOPHAGOGASTRODUODENOSCOPY  2017       Clinical Decision Making:     History  Co-morbidities and personal factors that may impact the plan of care Examination  Body Structures and Functions, activity limitations and participation restrictions that may impact the plan of care Clinical Presentation   Decision Making/ Complexity Score   Co-morbidities:   [] Time since onset of injury / illness /  exacerbation  [x] Status of current condition  []Patient's cognitive status and safety concerns    [] Multiple Medical Problems (see med hx)  Personal Factors:   [] Patient's age  [] Prior Level of function   [] Patient's home situation (environment and family support)  [] Patient's level of motivation  [] Expected progression of patient      HISTORY:(criteria)    [] 23423 - no personal factors/history    [x] 08574 - has 1-2 personal factor/comorbidity     [] 19389 - has >3 personal factor/comorbidity     Body Regions:  [] Objective examination findings  [] Head     []  Neck  [] Trunk   [] Upper Extremity  [] Lower Extremity    Body Systems:  [] For communication ability, affect, cognition, language, and learning style: the assessment of the ability to make needs known, consciousness, orientation (person, place, and time), expected emotional /behavioral responses, and learning preferences (eg, learning barriers, education  needs)  [x] For the neuromuscular system: a general assessment of gross coordinated movement (eg, balance, gait, locomotion, transfers, and transitions) and motor function  (motor control and motor learning)  [] For the musculoskeletal system: the assessment of gross symmetry, gross range of motion, gross strength, height, and weight  [] For the integumentary system: the assessment of pliability(texture), presence of scar formation, skin color, and skin integrity  [x] For cardiovascular/pulmonary system: the assessment of heart rate, respiratory rate, blood pressure, and edema     Activity limitations:    [] Patient's cognitive status and saf ety concerns          [] Status of current condition      [] Weight bearing restriction  [] Cardiopulmunary Restriction    Participation Restrictions:   [] Goals and goal agreement with the patient     [] Rehab potential (prognosis) and probable outcome      Examination of Body System: (criteria)    [x] 96426 - addressing 1-2 elements    [] 99686 - addressing  a total of 3 or more elements     [] 62978 -  Addressing a total of 4 or more elements         Clinical Presentation: (criteria)  Stable - 97292     On examination of body system using standardized tests and measures patient presents with 1-2 elements from any of the following: body structures and functions, activity limitations, and/or participation restrictions.  Leading to a clinical presentation that is considered stable and/or uncomplicated                              Clinical Decision Making  (Eval Complexity):  Low- 51035     Time Tracking:     PT Received On: 01/09/18  PT Start Time: 0853     PT Stop Time: 0915  PT Total Time (min): 22 min     Billable Minutes: Evaluation 14 and Therapeutic Activity 8      Levi Boone III, PT  01/09/2018

## 2018-01-09 NOTE — PLAN OF CARE
Problem: Patient Care Overview  Goal: Plan of Care Review  Outcome: Ongoing (interventions implemented as appropriate)  Total of 2 units of prbc's given overnight pt tolerated well with no signs of transfusion reaction.  After first unit of prbc's completed called into room pt reported vomiting.  Found approx 100ml of bright red blood with some clots pt stated that it was projectile no nausea states he feels better.  BP 90's/50's hr 80's. notified Dr. pena came to bedside and witnessed another 200ml of hematemesis.  orders to recheck cbc hold scheduled sotalol give 80mg of Protonix iv x1 with 500ml bolus.  CBC drawn post transfusion unchanged 7.2/21.9 ordered then to give  Another 1 unit of prbc's.  He was informed of NPO status.

## 2018-01-09 NOTE — PROGRESS NOTES
Attempted to call CT as it has been an hour and patient is still not in transport command center. CT in ER said they would not be scanning that patient and that I should call [phone] and speak to Saqib. No answer at that extension.

## 2018-01-09 NOTE — PLAN OF CARE
Problem: Occupational Therapy Goal  Goal: Occupational Therapy Goal  Goals to be met by: 01/15/18     Patient will increase functional independence with ADLs by performing:    UE Dressing with Modified [place].  LE Dressing with Modified [place].  Grooming while standing with Stand-by Assistance.  Toileting from bedside commode with Stand-by Assistance for hygiene and clothing management.   Toilet transfer to bedside commode with Stand-by Assistance.  Upper extremity exercise program x15 reps per handout, with supervision.    Outcome: Ongoing (interventions implemented as appropriate)    Pt was agreeable to OT/PT evaluation.  Goals established to assist pt with returning to OF regarding ADLs and func mobility.  Pt will benefit from skilled OT services in order to increase his level of safety and independence with ADLs and mobility.      Juana Jaquez, OT  1/9/2018

## 2018-01-09 NOTE — PROGRESS NOTES
[facility]  Advanced Endoscopy Service  Progress Note    Patient Name: Hal Sultana  MRN: [id number]  Admission Date: 1/7/2018  Hospital Length of Stay: 2 days  Code Status: Full Code   Attending Provider: Cj Orr MD  Consulting Provider: Angus Chairez MD  Primary Care Physician: Kaycee Ortega MD  Principal Problem: Obstructive jaundice      Subjective:     Interval History: No acute events overnight.  Awaiting path.    Review of Systems   Constitutional: Positive for appetite change, fatigue and unexpected weight change. Negative for activity change, chills, diaphoresis and fever.   HENT: Negative for trouble swallowing.    Eyes: Negative.    Respiratory: Negative.    Cardiovascular: Negative.    Gastrointestinal: Negative for abdominal distention, abdominal pain, anal bleeding, blood in stool, constipation, diarrhea, nausea, rectal pain and vomiting.   Genitourinary: Negative.    Musculoskeletal: Negative.    Skin: Positive for color change.   Neurological: Negative.      Objective:     Vital Signs (Most Recent):  Temp: 97.6 °F (36.4 °C) (01/09/18 1608)  Pulse: 71 (01/09/18 1608)  Resp: 15 (01/09/18 1608)  BP: (!) 126/57 (01/09/18 1608)  SpO2: 95 % (01/09/18 1608) Vital Signs (24h Range):  Temp:  [97.4 °F (36.3 °C)-98.6 °F (37 °C)] 97.6 °F (36.4 °C)  Pulse:  [70-94] 71  Resp:  [15-20] 15  SpO2:  [95 %-98 %] 95 %  BP: ()/(49-74) 126/57     Weight: 66.5 kg (146 lb 9.7 oz) (01/08/18 1115)  Body mass index is 21.65 kg/m².      Intake/Output Summary (Last 24 hours) at 01/09/18 1648  Last data filed at 01/09/18 1400   Gross per 24 hour   Intake          1395.92 ml   Output             1200 ml   Net           195.92 ml       Lines/Drains/Airways     Peripherally Inserted Central Catheter Line                 PICC Double Lumen 12/31/17 right brachial 9 days                Physical Exam   Constitutional: He is oriented to person, place, and time. No distress.   Eyes: Scleral  icterus is present.   Cardiovascular: Normal rate and regular rhythm.    Pulmonary/Chest: Effort normal and breath sounds normal.   Abdominal: Soft. Bowel sounds are normal. He exhibits no distension and no mass. There is no tenderness. There is no rebound and no guarding. No hernia.   Musculoskeletal: Normal range of motion. He exhibits no edema.   Neurological: He is alert and oriented to person, place, and time.   Skin: He is not diaphoretic.       Significant Labs:  CBC:   Recent Labs  Lab 01/08/18  0430 01/08/18  2355 01/09/18  0750   WBC 3.17* 13.42* 9.60   HGB 7.3* 7.2* 7.7*   HCT 21.6* 21.9* 23.2*   PLT 97* 148* 121*     CMP:   Recent Labs  Lab 01/09/18  0750   *   CALCIUM 8.0*   ALBUMIN 1.9*   PROT 4.5*      K 4.7   CO2 21*   *   BUN 44*   CREATININE 1.7*   ALKPHOS 321*   ALT 41   AST 60*   BILITOT 34.6*     Coagulation:   Recent Labs  Lab 01/08/18  0430   INR 1.2         Significant Imaging:  Imaging results within the past 24 hours have been reviewed.    Assessment/Plan:     * Obstructive jaundice    87 year old male with a history of HTN and MDS on who AES is being consulted for obstructive jaundice. His outside Ct, ERCP, and records of failed PTC drained were reviewed.    Recommendations:  -Await path  -Appreciate Surg Onc recommendations.                Thank you for your consult. I will follow-up with patient. Please contact us if you have any additional questions.    Angus Ledezma   Gastroenterology Fellow PGY VI  [phone]

## 2018-01-10 ENCOUNTER — SURGERY (OUTPATIENT)
Age: 83
End: 2018-01-10

## 2018-01-10 ENCOUNTER — ANESTHESIA (OUTPATIENT)
Dept: ENDOSCOPY | Facility: HOSPITAL | Age: 83
DRG: 374 | End: 2018-01-10
Payer: MEDICARE

## 2018-01-10 ENCOUNTER — ANESTHESIA EVENT (OUTPATIENT)
Dept: ENDOSCOPY | Facility: HOSPITAL | Age: 83
DRG: 374 | End: 2018-01-10
Payer: MEDICARE

## 2018-01-10 DIAGNOSIS — K83.1 BILIARY OBSTRUCTION: Primary | ICD-10-CM

## 2018-01-10 LAB
ALBUMIN SERPL BCP-MCNC: 2 G/DL
ALP SERPL-CCNC: 371 U/L
ALT SERPL W/O P-5'-P-CCNC: 44 U/L
ANION GAP SERPL CALC-SCNC: 7 MMOL/L
ANISOCYTOSIS BLD QL SMEAR: SLIGHT
AST SERPL-CCNC: 75 U/L
BASOPHILS # BLD AUTO: 0.02 K/UL
BASOPHILS # BLD AUTO: ABNORMAL K/UL
BASOPHILS # BLD AUTO: ABNORMAL K/UL
BASOPHILS NFR BLD: 0.4 %
BASOPHILS NFR BLD: 1 %
BASOPHILS NFR BLD: 1 %
BILIRUB SERPL-MCNC: 33.7 MG/DL
BLD PROD TYP BPU: NORMAL
BLD PROD TYP BPU: NORMAL
BLOOD UNIT EXPIRATION DATE: NORMAL
BLOOD UNIT EXPIRATION DATE: NORMAL
BLOOD UNIT TYPE CODE: 6200
BLOOD UNIT TYPE CODE: 6200
BLOOD UNIT TYPE: NORMAL
BLOOD UNIT TYPE: NORMAL
BUN SERPL-MCNC: 41 MG/DL
CALCIUM SERPL-MCNC: 8 MG/DL
CHLORIDE SERPL-SCNC: 113 MMOL/L
CO2 SERPL-SCNC: 20 MMOL/L
CODING SYSTEM: NORMAL
CODING SYSTEM: NORMAL
CREAT SERPL-MCNC: 1.4 MG/DL
DIFFERENTIAL METHOD: ABNORMAL
DISPENSE STATUS: NORMAL
DISPENSE STATUS: NORMAL
EOSINOPHIL # BLD AUTO: 0.1 K/UL
EOSINOPHIL # BLD AUTO: ABNORMAL K/UL
EOSINOPHIL # BLD AUTO: ABNORMAL K/UL
EOSINOPHIL NFR BLD: 1.8 %
EOSINOPHIL NFR BLD: 2 %
EOSINOPHIL NFR BLD: 2 %
ERYTHROCYTE [DISTWIDTH] IN BLOOD BY AUTOMATED COUNT: 20.8 %
ERYTHROCYTE [DISTWIDTH] IN BLOOD BY AUTOMATED COUNT: 20.8 %
ERYTHROCYTE [DISTWIDTH] IN BLOOD BY AUTOMATED COUNT: 21.3 %
EST. GFR  (AFRICAN AMERICAN): 51.9 ML/MIN/1.73 M^2
EST. GFR  (NON AFRICAN AMERICAN): 44.9 ML/MIN/1.73 M^2
GLUCOSE SERPL-MCNC: 97 MG/DL
HCT VFR BLD AUTO: 20.9 %
HCT VFR BLD AUTO: 24 %
HCT VFR BLD AUTO: 24 %
HGB BLD-MCNC: 7 G/DL
HGB BLD-MCNC: 8 G/DL
HGB BLD-MCNC: 8 G/DL
HYPOCHROMIA BLD QL SMEAR: ABNORMAL
HYPOCHROMIA BLD QL SMEAR: ABNORMAL
IMM GRANULOCYTES # BLD AUTO: 0.24 K/UL
IMM GRANULOCYTES # BLD AUTO: ABNORMAL K/UL
IMM GRANULOCYTES # BLD AUTO: ABNORMAL K/UL
IMM GRANULOCYTES NFR BLD AUTO: 4.8 %
IMM GRANULOCYTES NFR BLD AUTO: ABNORMAL %
IMM GRANULOCYTES NFR BLD AUTO: ABNORMAL %
INR PPP: 1.4
LYMPHOCYTES # BLD AUTO: 0.5 K/UL
LYMPHOCYTES # BLD AUTO: ABNORMAL K/UL
LYMPHOCYTES # BLD AUTO: ABNORMAL K/UL
LYMPHOCYTES NFR BLD: 10 %
LYMPHOCYTES NFR BLD: 10 %
LYMPHOCYTES NFR BLD: 10.3 %
MAGNESIUM SERPL-MCNC: 1.9 MG/DL
MCH RBC QN AUTO: 31.1 PG
MCH RBC QN AUTO: 31.1 PG
MCH RBC QN AUTO: 31.5 PG
MCHC RBC AUTO-ENTMCNC: 33.3 G/DL
MCHC RBC AUTO-ENTMCNC: 33.3 G/DL
MCHC RBC AUTO-ENTMCNC: 33.5 G/DL
MCV RBC AUTO: 93 FL
MCV RBC AUTO: 93 FL
MCV RBC AUTO: 94 FL
MONOCYTES # BLD AUTO: 0.6 K/UL
MONOCYTES # BLD AUTO: ABNORMAL K/UL
MONOCYTES # BLD AUTO: ABNORMAL K/UL
MONOCYTES NFR BLD: 11.3 %
MONOCYTES NFR BLD: 7 %
MONOCYTES NFR BLD: 7 %
NEUTROPHILS # BLD AUTO: 3.6 K/UL
NEUTROPHILS NFR BLD: 71.4 %
NEUTROPHILS NFR BLD: 80 %
NEUTROPHILS NFR BLD: 80 %
NRBC BLD-RTO: 0 /100 WBC
NRBC BLD-RTO: 1 /100 WBC
NRBC BLD-RTO: 1 /100 WBC
OVALOCYTES BLD QL SMEAR: ABNORMAL
OVALOCYTES BLD QL SMEAR: ABNORMAL
PHOSPHATE SERPL-MCNC: 2 MG/DL
PLATELET # BLD AUTO: 85 K/UL
PLATELET # BLD AUTO: 85 K/UL
PLATELET # BLD AUTO: 92 K/UL
PLATELET BLD QL SMEAR: ABNORMAL
PMV BLD AUTO: 10.8 FL
PMV BLD AUTO: 11.4 FL
PMV BLD AUTO: 11.4 FL
POLYCHROMASIA BLD QL SMEAR: ABNORMAL
POTASSIUM SERPL-SCNC: 3.9 MMOL/L
PROT SERPL-MCNC: 4.7 G/DL
PROTHROMBIN TIME: 14.2 SEC
RBC # BLD AUTO: 2.22 M/UL
RBC # BLD AUTO: 2.57 M/UL
RBC # BLD AUTO: 2.57 M/UL
SODIUM SERPL-SCNC: 140 MMOL/L
TRANS ERYTHROCYTES VOL PATIENT: NORMAL ML
TRANS ERYTHROCYTES VOL PATIENT: NORMAL ML
WBC # BLD AUTO: 4.78 K/UL
WBC # BLD AUTO: 4.78 K/UL
WBC # BLD AUTO: 5.05 K/UL

## 2018-01-10 PROCEDURE — 27202123 HC NEEDLE, CORE FNA (ANY): Performed by: INTERNAL MEDICINE

## 2018-01-10 PROCEDURE — 25000003 PHARM REV CODE 250: Performed by: STUDENT IN AN ORGANIZED HEALTH CARE EDUCATION/TRAINING PROGRAM

## 2018-01-10 PROCEDURE — 99233 SBSQ HOSP IP/OBS HIGH 50: CPT | Mod: ,,, | Performed by: INTERNAL MEDICINE

## 2018-01-10 PROCEDURE — 63600175 PHARM REV CODE 636 W HCPCS: Mod: JG | Performed by: STUDENT IN AN ORGANIZED HEALTH CARE EDUCATION/TRAINING PROGRAM

## 2018-01-10 PROCEDURE — 43274 ERCP DUCT STENT PLACEMENT: CPT | Performed by: INTERNAL MEDICINE

## 2018-01-10 PROCEDURE — 37000008 HC ANESTHESIA 1ST 15 MINUTES

## 2018-01-10 PROCEDURE — D9220A PRA ANESTHESIA: Mod: CRNA,,, | Performed by: NURSE ANESTHETIST, CERTIFIED REGISTERED

## 2018-01-10 PROCEDURE — 88342 IMHCHEM/IMCYTCHM 1ST ANTB: CPT | Mod: 26,,, | Performed by: PATHOLOGY

## 2018-01-10 PROCEDURE — 43274 ERCP DUCT STENT PLACEMENT: CPT | Mod: ,,, | Performed by: INTERNAL MEDICINE

## 2018-01-10 PROCEDURE — 83735 ASSAY OF MAGNESIUM: CPT

## 2018-01-10 PROCEDURE — 85610 PROTHROMBIN TIME: CPT

## 2018-01-10 PROCEDURE — 63600175 PHARM REV CODE 636 W HCPCS: Performed by: NURSE ANESTHETIST, CERTIFIED REGISTERED

## 2018-01-10 PROCEDURE — 88173 CYTOPATH EVAL FNA REPORT: CPT | Mod: 26,,, | Performed by: PATHOLOGY

## 2018-01-10 PROCEDURE — 74328 X-RAY BILE DUCT ENDOSCOPY: CPT | Performed by: INTERNAL MEDICINE

## 2018-01-10 PROCEDURE — 74328 X-RAY BILE DUCT ENDOSCOPY: CPT | Mod: ,,, | Performed by: INTERNAL MEDICINE

## 2018-01-10 PROCEDURE — 88172 CYTP DX EVAL FNA 1ST EA SITE: CPT | Mod: 26,,, | Performed by: PATHOLOGY

## 2018-01-10 PROCEDURE — 63600175 PHARM REV CODE 636 W HCPCS: Performed by: INTERNAL MEDICINE

## 2018-01-10 PROCEDURE — 0D798DZ DILATION OF DUODENUM WITH INTRALUMINAL DEVICE, VIA NATURAL OR ARTIFICIAL OPENING ENDOSCOPIC: ICD-10-PCS | Performed by: INTERNAL MEDICINE

## 2018-01-10 PROCEDURE — 43266 EGD ENDOSCOPIC STENT PLACE: CPT | Mod: 51,,, | Performed by: INTERNAL MEDICINE

## 2018-01-10 PROCEDURE — 0DD98ZX EXTRACTION OF DUODENUM, VIA NATURAL OR ARTIFICIAL OPENING ENDOSCOPIC, DIAGNOSTIC: ICD-10-PCS | Performed by: INTERNAL MEDICINE

## 2018-01-10 PROCEDURE — 0F998ZZ DRAINAGE OF COMMON BILE DUCT, VIA NATURAL OR ARTIFICIAL OPENING ENDOSCOPIC: ICD-10-PCS | Performed by: INTERNAL MEDICINE

## 2018-01-10 PROCEDURE — BF101ZZ FLUOROSCOPY OF BILE DUCTS USING LOW OSMOLAR CONTRAST: ICD-10-PCS | Performed by: INTERNAL MEDICINE

## 2018-01-10 PROCEDURE — C9113 INJ PANTOPRAZOLE SODIUM, VIA: HCPCS | Performed by: STUDENT IN AN ORGANIZED HEALTH CARE EDUCATION/TRAINING PROGRAM

## 2018-01-10 PROCEDURE — D9220A PRA ANESTHESIA: Mod: ANES,,, | Performed by: ANESTHESIOLOGY

## 2018-01-10 PROCEDURE — 63600175 PHARM REV CODE 636 W HCPCS: Performed by: STUDENT IN AN ORGANIZED HEALTH CARE EDUCATION/TRAINING PROGRAM

## 2018-01-10 PROCEDURE — P9021 RED BLOOD CELLS UNIT: HCPCS

## 2018-01-10 PROCEDURE — 27202304 HC CANNULA, ERCP: Performed by: INTERNAL MEDICINE

## 2018-01-10 PROCEDURE — 84100 ASSAY OF PHOSPHORUS: CPT

## 2018-01-10 PROCEDURE — 25000003 PHARM REV CODE 250: Performed by: NURSE ANESTHETIST, CERTIFIED REGISTERED

## 2018-01-10 PROCEDURE — 80053 COMPREHEN METABOLIC PANEL: CPT

## 2018-01-10 PROCEDURE — 85025 COMPLETE CBC W/AUTO DIFF WBC: CPT

## 2018-01-10 PROCEDURE — 43266 EGD ENDOSCOPIC STENT PLACE: CPT | Performed by: INTERNAL MEDICINE

## 2018-01-10 PROCEDURE — 43238 EGD US FINE NEEDLE BX/ASPIR: CPT | Performed by: INTERNAL MEDICINE

## 2018-01-10 PROCEDURE — C1769 GUIDE WIRE: HCPCS | Performed by: INTERNAL MEDICINE

## 2018-01-10 PROCEDURE — 74360 X-RAY GUIDE GI DILATION: CPT | Mod: 59,,, | Performed by: INTERNAL MEDICINE

## 2018-01-10 PROCEDURE — 88305 TISSUE EXAM BY PATHOLOGIST: CPT | Performed by: PATHOLOGY

## 2018-01-10 PROCEDURE — 37000009 HC ANESTHESIA EA ADD 15 MINS

## 2018-01-10 PROCEDURE — 0F798DZ DILATION OF COMMON BILE DUCT WITH INTRALUMINAL DEVICE, VIA NATURAL OR ARTIFICIAL OPENING ENDOSCOPIC: ICD-10-PCS | Performed by: INTERNAL MEDICINE

## 2018-01-10 PROCEDURE — 36415 COLL VENOUS BLD VENIPUNCTURE: CPT

## 2018-01-10 PROCEDURE — 25000003 PHARM REV CODE 250: Performed by: INTERNAL MEDICINE

## 2018-01-10 PROCEDURE — C1874 STENT, COATED/COV W/DEL SYS: HCPCS | Performed by: INTERNAL MEDICINE

## 2018-01-10 PROCEDURE — 43238 EGD US FINE NEEDLE BX/ASPIR: CPT | Mod: 59,,, | Performed by: INTERNAL MEDICINE

## 2018-01-10 PROCEDURE — 20600001 HC STEP DOWN PRIVATE ROOM

## 2018-01-10 RX ORDER — DIPHENHYDRAMINE HYDROCHLORIDE 50 MG/ML
25 INJECTION INTRAMUSCULAR; INTRAVENOUS EVERY 6 HOURS PRN
Status: DISCONTINUED | OUTPATIENT
Start: 2018-01-10 | End: 2018-01-10 | Stop reason: HOSPADM

## 2018-01-10 RX ORDER — METOCLOPRAMIDE HYDROCHLORIDE 5 MG/ML
10 INJECTION INTRAMUSCULAR; INTRAVENOUS EVERY 10 MIN PRN
Status: DISCONTINUED | OUTPATIENT
Start: 2018-01-10 | End: 2018-01-10 | Stop reason: HOSPADM

## 2018-01-10 RX ORDER — KETAMINE HCL IN 0.9 % NACL 50 MG/5 ML
SYRINGE (ML) INTRAVENOUS
Status: DISCONTINUED | OUTPATIENT
Start: 2018-01-10 | End: 2018-01-10

## 2018-01-10 RX ORDER — HYDROCODONE BITARTRATE AND ACETAMINOPHEN 500; 5 MG/1; MG/1
TABLET ORAL
Status: DISCONTINUED | OUTPATIENT
Start: 2018-01-10 | End: 2018-01-12 | Stop reason: HOSPADM

## 2018-01-10 RX ORDER — PROPOFOL 10 MG/ML
VIAL (ML) INTRAVENOUS
Status: DISCONTINUED | OUTPATIENT
Start: 2018-01-10 | End: 2018-01-10

## 2018-01-10 RX ORDER — ETOMIDATE 2 MG/ML
INJECTION INTRAVENOUS
Status: DISCONTINUED | OUTPATIENT
Start: 2018-01-10 | End: 2018-01-10

## 2018-01-10 RX ORDER — PHENYLEPHRINE HYDROCHLORIDE 10 MG/ML
INJECTION INTRAVENOUS
Status: DISCONTINUED | OUTPATIENT
Start: 2018-01-10 | End: 2018-01-10

## 2018-01-10 RX ORDER — SODIUM CHLORIDE 0.9 % (FLUSH) 0.9 %
3 SYRINGE (ML) INJECTION
Status: DISCONTINUED | OUTPATIENT
Start: 2018-01-10 | End: 2018-01-12 | Stop reason: HOSPADM

## 2018-01-10 RX ORDER — OXYCODONE AND ACETAMINOPHEN 5; 325 MG/1; MG/1
1 TABLET ORAL
Status: DISCONTINUED | OUTPATIENT
Start: 2018-01-10 | End: 2018-01-10 | Stop reason: HOSPADM

## 2018-01-10 RX ORDER — PROPOFOL 10 MG/ML
VIAL (ML) INTRAVENOUS CONTINUOUS PRN
Status: DISCONTINUED | OUTPATIENT
Start: 2018-01-10 | End: 2018-01-10

## 2018-01-10 RX ORDER — MEPERIDINE HYDROCHLORIDE 50 MG/ML
12.5 INJECTION INTRAMUSCULAR; INTRAVENOUS; SUBCUTANEOUS EVERY 10 MIN PRN
Status: DISCONTINUED | OUTPATIENT
Start: 2018-01-10 | End: 2018-01-10 | Stop reason: HOSPADM

## 2018-01-10 RX ADMIN — PHENYLEPHRINE HYDROCHLORIDE 100 MCG: 10 INJECTION INTRAVENOUS at 04:01

## 2018-01-10 RX ADMIN — PROPOFOL 50 MCG/KG/MIN: 10 INJECTION, EMULSION INTRAVENOUS at 03:01

## 2018-01-10 RX ADMIN — PROPOFOL 60 MG: 10 INJECTION, EMULSION INTRAVENOUS at 03:01

## 2018-01-10 RX ADMIN — Medication 25 MG: at 03:01

## 2018-01-10 RX ADMIN — AMLODIPINE BESYLATE 5 MG: 5 TABLET ORAL at 09:01

## 2018-01-10 RX ADMIN — ETOMIDATE 5 MG: 2 INJECTION, SOLUTION INTRAVENOUS at 03:01

## 2018-01-10 RX ADMIN — PANTOPRAZOLE SODIUM 40 MG: 40 INJECTION, POWDER, FOR SOLUTION INTRAVENOUS at 09:01

## 2018-01-10 RX ADMIN — OCTREOTIDE ACETATE 50 MCG/HR: 1000 INJECTION, SOLUTION INTRAVENOUS; SUBCUTANEOUS at 05:01

## 2018-01-10 RX ADMIN — CHOLESTYRAMINE 4 G: 4 POWDER, FOR SUSPENSION ORAL at 09:01

## 2018-01-10 RX ADMIN — DAPTOMYCIN 500 MG: 500 INJECTION, POWDER, LYOPHILIZED, FOR SOLUTION INTRAVENOUS at 12:01

## 2018-01-10 RX ADMIN — CEFTRIAXONE SODIUM 1 G: 1 INJECTION, POWDER, FOR SOLUTION INTRAMUSCULAR; INTRAVENOUS at 10:01

## 2018-01-10 RX ADMIN — ETOMIDATE 6 MG: 2 INJECTION, SOLUTION INTRAVENOUS at 03:01

## 2018-01-10 RX ADMIN — MONTELUKAST SODIUM 10 MG: 10 TABLET, FILM COATED ORAL at 09:01

## 2018-01-10 RX ADMIN — DIPHENHYDRAMINE HYDROCHLORIDE 25 MG: 25 CAPSULE ORAL at 10:01

## 2018-01-10 RX ADMIN — SODIUM CHLORIDE: 0.9 INJECTION, SOLUTION INTRAVENOUS at 05:01

## 2018-01-10 RX ADMIN — CHOLESTYRAMINE 4 G: 4 POWDER, FOR SUSPENSION ORAL at 05:01

## 2018-01-10 NOTE — PROGRESS NOTES
I spoke to MD on call for interventional radiology resident extension [phone] and notified him of new dressing and incision to right lateral mid chest no LDA charted and no operational note to verify this site. He said he would try to get in touch with IR staff from today to clarify. Floor Rn Rosanne said this was not present prior to departure for procedures today I also asked Dr. Eng if he knew any knowledge of this site and  he did not.

## 2018-01-10 NOTE — PROVATION PATIENT INSTRUCTIONS
Discharge Summary/Instructions after an Endoscopic Procedure  Patient Name: Hal Sultana  Patient MRN: [id number]  Patient YOB: 1930     Wednesday, January 10, 2018  Lance Sanchez MD  RESTRICTIONS:  During your procedure today, you received medications for sedation.  These   medications may affect your judgment, balance and coordination.  Therefore,   for 24 hours, you have the following restrictions:   - DO NOT drive a car, operate machinery, make legal/financial decisions,   sign important papers or drink alcohol.    ACTIVITY:  The following day: return to full activity including work, except no heavy   lifting, straining or running for 3 days if polyps were removed.  DIET:  Eat and drink normally unless instructed otherwise.     TREATMENT FOR COMMON SIDE EFFECTS:  - Mild abdominal pain, belching, bloating or excessive gas: rest, eat   lightly and use a heating pad.  - Sore Throat: treat with throat lozenges and/or gargle with warm salt   water.  SYMPTOMS TO WATCH FOR AND REPORT TO YOUR PHYSICIAN:  1. Abdominal pain or bloating, other than gas cramps.  2. Chest pain.  3. Back pain.  4. Chills or fever occurring within 24 hours after the procedure.  5. Rectal bleeding, which would show as bright red, maroon, or black stools.   (A tablespoon of blood from the rectum is not serious, especially if   hemorrhoids are present.)  6. Vomiting.  7. Weakness or dizziness.  8. Because air was used during the procedure, expelling large amounts of air   from your rectum or belching is normal.  9. If a bowel prep was taken, you may not have a bowel movement for 1-3   days.  This is normal.  GO DIRECTLY TO THE NEAREST EMERGENCY ROOM IF YOU HAVE ANY OF THE FOLLOWING:      Difficulty breathing  Chills and/or fever over 101 F   Persistent vomiting and/or vomiting blood   Severe abdominal pain   Severe chest pain   Black, tarry stools   Bleeding- more than one tablespoon   Any other symptom or condition that you may feel needs  urgent attention  Your doctor recommends these additional instructions:  If any biopsies were taken, your doctor s clinic will contact you in 1 to 2   weeks with any results.  None  For questions, problems or results please call your physician - Lance Sanchez MD at Work:  [phone].  [facility], EMERGENCY ROOM PHONE NUMBER: [phone]  IF A COMPLICATION OR EMERGENCY SITUATION ARISES AND YOU ARE UNABLE TO REACH   YOUR PHYSICIAN - GO DIRECTLY TO THE EMERGENCY ROOM.  Lance Sanchez MD  1/10/2018 5:07:39 PM  This report has been verified and signed electronically.

## 2018-01-10 NOTE — ASSESSMENT & PLAN NOTE
Diagnosed 18 mos ago.  Patient pancytopenic. On Vidaza at home, but hemo onc consulted and recommend holding in setting of additional malignancy workup.   - Will continue to monitor CBCs and transfuse prn  - Will consider marinol 2.5mg BID in future as appetite stimulant

## 2018-01-10 NOTE — ASSESSMENT & PLAN NOTE
- Remote history of CAD and CABG  - Currently stable, no chest pain, on Aspirin and Statin at home  - Held aspirin in setting of GIB

## 2018-01-10 NOTE — PLAN OF CARE
Patient's VSS and in NAD for shift. Patient calm, cooperative, and AAOx4. He is very somnolent this shift. Patient has had two additional liquid bowel movements that are maroon in color. Patient still on ocreotide drip at 10 ml/hr. Started fluids on him just after noon. NS at 100 ml/hr. Patient tried to sit up with OT but he stated he felt faint and dizzy. Patient is to have a CT of abdomen and pelvis today for suspected malignancy. Have contacted CT several times, but they have stated that they had to do stats in front of this patient and will call when they are putting in for transport. IM team aware of bloody stools. Patient free from falls, injury, and skin breakdown. Still NPO waiting for CT and due to vomiting of blood last evening.

## 2018-01-10 NOTE — ANESTHESIA RELEASE NOTE
"Anesthesia Release from PACU Note    Patient: Hal Sultana    Procedure(s) Performed: Procedure(s) (LRB):  DRAINAGE-CATHETER-BILIARY (N/A)    Anesthesia type: GEN    Post pain: Adequate analgesia reported    Post assessment: no apparent anesthetic complications, tolerated procedure well and no evidence of recall    Post vital signs: BP (!) 157/66   Pulse 84   Temp 36.7 °C (98.1 °F) (Temporal)   Resp 16   Ht 5' 9" (1.753 m)   Wt 66.5 kg (146 lb 9.7 oz)   SpO2 100%   BMI 21.65 kg/m²     Level of consciousness: awake, alert and oriented    Nausea/Vomiting: no nausea/no vomiting    Complications: none    Airway Patency: patent    Respiratory: unassisted, spontaneous ventilation, room air    Cardiovascular: stable and blood pressure at baseline    Hydration: euvolemic    "

## 2018-01-10 NOTE — PROGRESS NOTES
[facility]  Advanced Endoscopy Service  Progress Note    Patient Name: Hal Sultana  MRN: [id number]  Admission Date: 1/7/2018  Hospital Length of Stay: 3 days  Code Status: Full Code   Attending Provider: Cj Orr MD  Consulting Provider: Angus Chairez MD  Primary Care Physician: Kaycee Ortega MD  Principal Problem: Obstructive jaundice      Subjective:     Interval History: No acute events overnight.     Review of Systems   Constitutional: Positive for appetite change, fatigue and unexpected weight change. Negative for activity change, chills, diaphoresis and fever.   HENT: Negative for trouble swallowing.    Eyes: Negative.    Respiratory: Negative.    Cardiovascular: Negative.    Gastrointestinal: Negative for abdominal distention, abdominal pain, anal bleeding, blood in stool, constipation, diarrhea, nausea, rectal pain and vomiting.   Genitourinary: Negative.    Musculoskeletal: Negative.    Skin: Positive for color change.   Neurological: Negative.      Objective:     Vital Signs (Most Recent):  Temp: 98 °F (36.7 °C) (01/10/18 1237)  Pulse: 85 (01/10/18 1237)  Resp: 18 (01/10/18 1237)  BP: (!) 183/74 (01/10/18 1237)  SpO2: 96 % (01/10/18 1237) Vital Signs (24h Range):  Temp:  [97.4 °F (36.3 °C)-98.1 °F (36.7 °C)] 98 °F (36.7 °C)  Pulse:  [71-85] 85  Resp:  [14-18] 18  SpO2:  [89 %-97 %] 96 %  BP: (121-183)/(57-74) 183/74     Weight: 66.5 kg (146 lb 9.7 oz) (01/08/18 1115)  Body mass index is 21.65 kg/m².      Intake/Output Summary (Last 24 hours) at 01/10/18 1352  Last data filed at 01/10/18 0344   Gross per 24 hour   Intake           807.16 ml   Output             1275 ml   Net          -467.84 ml       Lines/Drains/Airways     Peripherally Inserted Central Catheter Line                 PICC Double Lumen 12/31/17 right brachial 10 days                Physical Exam   Constitutional: He is oriented to person, place, and time. No distress.   Eyes: Scleral icterus is  present.   Cardiovascular: Normal rate and regular rhythm.    Pulmonary/Chest: Effort normal and breath sounds normal.   Abdominal: Soft. Bowel sounds are normal. He exhibits no distension and no mass. There is no tenderness. There is no rebound and no guarding. No hernia.   Musculoskeletal: Normal range of motion. He exhibits no edema.   Neurological: He is alert and oriented to person, place, and time.   Skin: He is not diaphoretic.       Significant Labs:  CBC:   Recent Labs  Lab 01/09/18  0750 01/09/18  2047 01/10/18  0518   WBC 9.60 6.18 5.05   HGB 7.7* 7.5* 7.0*   HCT 23.2* 22.4* 20.9*   * 97* 92*     CMP:   Recent Labs  Lab 01/10/18  0518   GLU 97   CALCIUM 8.0*   ALBUMIN 2.0*   PROT 4.7*      K 3.9   CO2 20*   *   BUN 41*   CREATININE 1.4   ALKPHOS 371*   ALT 44   AST 75*   BILITOT 33.7*     Coagulation:   Recent Labs  Lab 01/10/18  1023   INR 1.4*         Significant Imaging:  Imaging results within the past 24 hours have been reviewed.    Assessment/Plan:     * Obstructive jaundice    87 year old male with a history of HTN and MDS on who [facility] is being consulted for obstructive jaundice. His outside Ct, ERCP, and records of failed PTC drained were reviewed.    Path: nondiagnostic, however atypical cells present.     Recommendations:  Proceed with duodenal stenting + choledochoduodenostomy placement today.  Plan explained to patient and daughter who are in agreement.      Keep NPO                      Thank you for your consult. I will follow-up with patient. Please contact us if you have any additional questions.    Angus Ledezma   Gastroenterology Fellow PGY VI  [phone]

## 2018-01-10 NOTE — SUBJECTIVE & OBJECTIVE
Subjective:     Interval History: No acute events overnight.     Review of Systems   Constitutional: Positive for appetite change, fatigue and unexpected weight change. Negative for activity change, chills, diaphoresis and fever.   HENT: Negative for trouble swallowing.    Eyes: Negative.    Respiratory: Negative.    Cardiovascular: Negative.    Gastrointestinal: Negative for abdominal distention, abdominal pain, anal bleeding, blood in stool, constipation, diarrhea, nausea, rectal pain and vomiting.   Genitourinary: Negative.    Musculoskeletal: Negative.    Skin: Positive for color change.   Neurological: Negative.      Objective:     Vital Signs (Most Recent):  Temp: 98 °F (36.7 °C) (01/10/18 1237)  Pulse: 85 (01/10/18 1237)  Resp: 18 (01/10/18 1237)  BP: (!) 183/74 (01/10/18 1237)  SpO2: 96 % (01/10/18 1237) Vital Signs (24h Range):  Temp:  [97.4 °F (36.3 °C)-98.1 °F (36.7 °C)] 98 °F (36.7 °C)  Pulse:  [71-85] 85  Resp:  [14-18] 18  SpO2:  [89 %-97 %] 96 %  BP: (121-183)/(57-74) 183/74     Weight: 66.5 kg (146 lb 9.7 oz) (01/08/18 1115)  Body mass index is 21.65 kg/m².      Intake/Output Summary (Last 24 hours) at 01/10/18 1352  Last data filed at 01/10/18 0344   Gross per 24 hour   Intake           807.16 ml   Output             1275 ml   Net          -467.84 ml       Lines/Drains/Airways     Peripherally Inserted Central Catheter Line                 PICC Double Lumen 12/31/17 right brachial 10 days                Physical Exam   Constitutional: He is oriented to person, place, and time. No distress.   Eyes: Scleral icterus is present.   Cardiovascular: Normal rate and regular rhythm.    Pulmonary/Chest: Effort normal and breath sounds normal.   Abdominal: Soft. Bowel sounds are normal. He exhibits no distension and no mass. There is no tenderness. There is no rebound and no guarding. No hernia.   Musculoskeletal: Normal range of motion. He exhibits no edema.   Neurological: He is alert and oriented to  person, place, and time.   Skin: He is not diaphoretic.       Significant Labs:  CBC:   Recent Labs  Lab 01/09/18  0750 01/09/18  2047 01/10/18  0518   WBC 9.60 6.18 5.05   HGB 7.7* 7.5* 7.0*   HCT 23.2* 22.4* 20.9*   * 97* 92*     CMP:   Recent Labs  Lab 01/10/18  0518   GLU 97   CALCIUM 8.0*   ALBUMIN 2.0*   PROT 4.7*      K 3.9   CO2 20*   *   BUN 41*   CREATININE 1.4   ALKPHOS 371*   ALT 44   AST 75*   BILITOT 33.7*     Coagulation:   Recent Labs  Lab 01/10/18  1023   INR 1.4*         Significant Imaging:  Imaging results within the past 24 hours have been reviewed.

## 2018-01-10 NOTE — H&P
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    Admission H&P reviewed.  Past Medical History:   Diagnosis Date    AAA (abdominal aortic aneurysm)     BPH (benign prostatic hyperplasia)     Duodenal stricture     Hypertension      Past Surgical History:   Procedure Laterality Date    ABDOMINAL AORTIC ANEURYSM REPAIR      CORONARY ARTERY BYPASS GRAFT      ERCP      ESOPHAGOGASTRODUODENOSCOPY  12/13/2017       Review of Systems:   As documented in primary team H&P    Home Meds:   Prior to Admission medications    Medication Sig Start Date End Date Taking? Authorizing Provider   albuterol 2 mg/5 mL syrup Take 2.5 mg by mouth every 4 (four) hours as needed.   Yes Historical Provider, MD   amLODIPine (NORVASC) 5 MG tablet Take 5 mg by mouth every evening.   Yes Historical Provider, MD   aspirin 81 MG Chew Take 81 mg by mouth once daily.   Yes Historical Provider, MD   finasteride (PROSCAR) 5 mg tablet Take 5 mg by mouth every other day.   Yes Historical Provider, MD   fluticasone (FLONASE) 50 mcg/actuation nasal spray 2 sprays by Each Nare route daily as needed for Rhinitis.   Yes Historical Provider, MD   loratadine-pseudoephedrine 5-120 mg (CLARITIN-D 12-HOUR) 5-120 mg per tablet Take 1 tablet by mouth 2 (two) times daily as needed for Allergies.   Yes Historical Provider, MD   montelukast (SINGULAIR) 10 mg tablet Take 10 mg by mouth every evening.   Yes Historical Provider, MD   nitroGLYCERIN 0.4 MG/DOSE TL SPRY (NITROLINGUAL) 400 mcg/spray spray Place 1 spray under the tongue every 5 (five) minutes as needed for Chest pain.   Yes Historical Provider, MD   omeprazole (PRILOSEC) 40 MG capsule Take 40 mg by mouth every evening.   Yes Historical Provider, MD   polyethylene glycol (GLYCOLAX) 17 gram PwPk Take 17 g by mouth daily as needed (constipation).    Yes Historical Provider, MD    pravastatin (PRAVACHOL) 20 MG tablet Take 20 mg by mouth every evening.   Yes Historical Provider, MD   sotalol (BETAPACE) 40 MG tablet Take 40 mg by mouth 2 (two) times daily.   Yes Historical Provider, MD   tamsulosin (FLOMAX) 0.4 mg Cp24 Take 0.4 mg by mouth every evening.   Yes Historical Provider, MD   tiotropium (SPIRIVA) 18 mcg inhalation capsule Inhale 18 mcg into the lungs once daily. Controller   Yes Historical Provider, MD   tolterodine (DETROL LA) 4 MG 24 hr capsule Take 4 mg by mouth once daily.   Yes Historical Provider, MD     Scheduled Meds:    amLODIPine  5 mg Oral Daily    cefTRIAXone (ROCEPHIN) IVPB  1 g Intravenous Q24H    cholestyramine  1 packet Oral TID    DAPTOmycin (CUBICIN)  IV  500 mg Intravenous Q24H    montelukast  10 mg Oral Daily    pantoprazole  40 mg Intravenous BID    polyethylene glycol  17 g Oral Daily     Continuous Infusions:    sodium chloride 0.9% 100 mL/hr at 01/09/18 1527    octreotide (SANDOSTATIN) infusion 50 mcg/hr (01/09/18 1830)     PRN Meds:sodium chloride, dextrose 50%, dextrose 50%, diphenhydrAMINE, diphenhydrAMINE-zinc acetate 1-0.1%, glucagon (human recombinant), glucose, glucose, insulin aspart, ramelteon, sodium chloride 0.9%, sodium chloride 0.9%  Anticoagulants/Antiplatelets: aspirin- last dose 1/8/18    Allergies:   Review of patient's allergies indicates:   Allergen Reactions    Avelox [moxifloxacin]     Darvocet a500 [propoxyphene n-acetaminophen]     Ibudone [hydrocodone-ibuprofen]     Levaquin [levofloxacin]      Sedation Hx: have not been any systemic reactions    Labs:    Recent Labs  Lab 01/08/18  0430   INR 1.2       Recent Labs  Lab 01/10/18  0518   WBC 5.05   HGB 7.0*   HCT 20.9*   MCV 94   PLT 92*      Recent Labs  Lab 01/07/18  0053  01/10/18  0518   *  < > 97     < > 140   K 3.5  < > 3.9     < > 113*   CO2 23  < > 20*   BUN 19  < > 41*   CREATININE 1.5*  < > 1.4   CALCIUM 9.0  < > 8.0*   MG  --   < > 1.9   ALT 66*   < > 44   AST 80*  < > 75*   ALBUMIN 2.3*  < > 2.0*   BILITOT 30.8*  < > 33.7*   BILIDIR >14.0*  --   --    < > = values in this interval not displayed.      Vitals:  Temp: 97.8 °F (36.6 °C) (01/10/18 0833)  Pulse: 77 (01/10/18 0833)  Resp: 15 (01/10/18 0833)  BP: (!) 143/62 (01/10/18 0833)  SpO2: 95 % (01/10/18 0833)     Physical Exam:  ASA: 2  Mallampati: 2    General: no acute distress  Mental Status: alert and oriented to person, place and time  HEENT: normocephalic, atraumatic  Chest: unlabored breathing  Heart: regular heart rate  Abdomen: nondistended  Extremity: moves all extremities    Plan: PTC  Sedation Plan: per anesthesia    Lonny Alexander MD  Radiology PGY-2  [phone]

## 2018-01-10 NOTE — SUBJECTIVE & OBJECTIVE
Interval History: NAEON. Patient's vitals were stable, although he did have one melenic BM this morning.  He was given another unit of pRBCs for Hb 7.0.     Review of Systems   Constitutional: Positive for appetite change, fatigue and unexpected weight change. Negative for activity change, chills, diaphoresis and fever.   HENT: Negative for trouble swallowing.    Eyes: Negative.    Respiratory: Negative.    Cardiovascular: Negative.    Gastrointestinal: Negative for abdominal distention, abdominal pain, anal bleeding, blood in stool, constipation, diarrhea, nausea, rectal pain and vomiting.   Genitourinary: Negative.    Musculoskeletal: Negative.    Skin: Positive for color change.   Neurological: Negative.           Objective:     Vital Signs (Most Recent):  Temp: 98.1 °F (36.7 °C) (01/10/18 1406)  Pulse: 76 (01/10/18 1500)  Resp: 16 (01/10/18 1406)  BP: (!) 150/70 (01/10/18 1406)  SpO2: 99 % (01/10/18 1406) Vital Signs (24h Range):  Temp:  [97.4 °F (36.3 °C)-98.1 °F (36.7 °C)] 98.1 °F (36.7 °C)  Pulse:  [73-85] 76  Resp:  [14-18] 16  SpO2:  [89 %-99 %] 99 %  BP: (121-183)/(58-74) 150/70     Weight: 66.5 kg (146 lb 9.7 oz)  Body mass index is 21.65 kg/m².    Intake/Output Summary (Last 24 hours) at 01/10/18 1618  Last data filed at 01/10/18 0344   Gross per 24 hour   Intake           807.16 ml   Output             1025 ml   Net          -217.84 ml      Physical Exam   Constitutional: He is oriented to person, place, and time. No distress.   Eyes: Scleral icterus is present.   Cardiovascular: Normal rate and regular rhythm.    Pulmonary/Chest: Effort normal. No respiratory distress.   Mild crackles at lung base b/l   Abdominal: Soft. Bowel sounds are normal. He exhibits no distension and no mass. There is no tenderness. There is no rebound and no guarding.   Musculoskeletal: Normal range of motion. He exhibits no edema.   Neurological: He is alert and oriented to person, place, and time.   Skin: He is not  diaphoretic.   jaundiced       Significant Labs:  Recent Results (from the past 24 hour(s))   CBC auto differential    Collection Time: 01/09/18  8:47 PM   Result Value Ref Range    WBC 6.18 3.90 - 12.70 K/uL    RBC 2.37 (L) 4.60 - 6.20 M/uL    Hemoglobin 7.5 (L) 14.0 - 18.0 g/dL    Hematocrit 22.4 (L) 40.0 - 54.0 %    MCV 95 82 - 98 fL    MCH 31.6 (H) 27.0 - 31.0 pg    MCHC 33.5 32.0 - 36.0 g/dL    RDW 21.1 (H) 11.5 - 14.5 %    Platelets 97 (L) 150 - 350 K/uL    MPV 10.3 9.2 - 12.9 fL    Immature Granulocytes 4.0 (H) 0.0 - 0.5 %    Gran # 4.2 1.8 - 7.7 K/uL    Immature Grans (Abs) 0.25 (H) 0.00 - 0.04 K/uL    Lymph # 1.0 1.0 - 4.8 K/uL    Mono # 0.7 0.3 - 1.0 K/uL    Eos # 0.1 0.0 - 0.5 K/uL    Baso # 0.02 0.00 - 0.20 K/uL    nRBC 1 (A) 0 /100 WBC    Gran% 68.2 38.0 - 73.0 %    Lymph% 15.4 (L) 18.0 - 48.0 %    Mono% 11.0 4.0 - 15.0 %    Eosinophil% 1.1 0.0 - 8.0 %    Basophil% 0.3 0.0 - 1.9 %    Differential Method Automated    Comprehensive Metabolic Panel (CMP)    Collection Time: 01/10/18  5:18 AM   Result Value Ref Range    Sodium 140 136 - 145 mmol/L    Potassium 3.9 3.5 - 5.1 mmol/L    Chloride 113 (H) 95 - 110 mmol/L    CO2 20 (L) 23 - 29 mmol/L    Glucose 97 70 - 110 mg/dL    BUN, Bld 41 (H) 8 - 23 mg/dL    Creatinine 1.4 0.5 - 1.4 mg/dL    Calcium 8.0 (L) 8.7 - 10.5 mg/dL    Total Protein 4.7 (L) 6.0 - 8.4 g/dL    Albumin 2.0 (L) 3.5 - 5.2 g/dL    Total Bilirubin 33.7 (H) 0.1 - 1.0 mg/dL    Alkaline Phosphatase 371 (H) 55 - 135 U/L    AST 75 (H) 10 - 40 U/L    ALT 44 10 - 44 U/L    Anion Gap 7 (L) 8 - 16 mmol/L    eGFR if African American 51.9 (A) >60 mL/min/1.73 m^2    eGFR if non African American 44.9 (A) >60 mL/min/1.73 m^2   Magnesium    Collection Time: 01/10/18  5:18 AM   Result Value Ref Range    Magnesium 1.9 1.6 - 2.6 mg/dL   Phosphorus    Collection Time: 01/10/18  5:18 AM   Result Value Ref Range    Phosphorus 2.0 (L) 2.7 - 4.5 mg/dL   CBC with Automated Differential    Collection Time: 01/10/18   5:18 AM   Result Value Ref Range    WBC 5.05 3.90 - 12.70 K/uL    RBC 2.22 (L) 4.60 - 6.20 M/uL    Hemoglobin 7.0 (L) 14.0 - 18.0 g/dL    Hematocrit 20.9 (L) 40.0 - 54.0 %    MCV 94 82 - 98 fL    MCH 31.5 (H) 27.0 - 31.0 pg    MCHC 33.5 32.0 - 36.0 g/dL    RDW 21.3 (H) 11.5 - 14.5 %    Platelets 92 (L) 150 - 350 K/uL    MPV 10.8 9.2 - 12.9 fL    Immature Granulocytes 4.8 (H) 0.0 - 0.5 %    Gran # 3.6 1.8 - 7.7 K/uL    Immature Grans (Abs) 0.24 (H) 0.00 - 0.04 K/uL    Lymph # 0.5 (L) 1.0 - 4.8 K/uL    Mono # 0.6 0.3 - 1.0 K/uL    Eos # 0.1 0.0 - 0.5 K/uL    Baso # 0.02 0.00 - 0.20 K/uL    nRBC 0 0 /100 WBC    Gran% 71.4 38.0 - 73.0 %    Lymph% 10.3 (L) 18.0 - 48.0 %    Mono% 11.3 4.0 - 15.0 %    Eosinophil% 1.8 0.0 - 8.0 %    Basophil% 0.4 0.0 - 1.9 %    Platelet Estimate Decreased (A)     Aniso Slight     Poly Occasional     Differential Method Automated    Protime-INR    Collection Time: 01/10/18 10:23 AM   Result Value Ref Range    Prothrombin Time 14.2 (H) 9.0 - 12.5 sec    INR 1.4 (H) 0.8 - 1.2

## 2018-01-10 NOTE — ASSESSMENT & PLAN NOTE
1 episode of hematemesis and 2 melenic BMs in evening of 1/8  EUS performed on 1/8 noted clots in gastric fundus without active bleeding  Per [facility], to be expected given findings of clot and to continue to monitor vitals  Started on IVF, octreotide, protonix IV, and rocephin

## 2018-01-10 NOTE — PROGRESS NOTES
[facility] Hospital Medicine  Progress Note    Patient Name: Hal Sultana  MRN: [id number]  Patient Class: IP- Inpatient   Admission Date: 1/7/2018  Length of Stay: 3 days  Attending Physician: Cj Orr MD  Primary Care Provider: Kaycee Ortega MD    [facility] Medicine Team: [facility]OSP MED 4 Sahara Nazario MD    Subjective:     Principal Problem:Obstructive jaundice    HPI:  This is Mr. Hal Sultana, 87 year old male with PMHx is significant for acquired pancytopenia, aortic aneurysm, stable CAD, esophageal dysmotility, gout, hypertension. Myelodysplastic syndrome. He is a transfer from [place], [place]. He was accepted for transfer by Dr. Sanchez from [facility] for possible intervention for obstructive jaundice.     He presented to ID clinic on January 1st, 2018 with worsening jaundice and T. Bili reached 19. He was discharged from the same hospital on 12/29/2017 after admission for pruritus and jaundice, underwent MRCP was inconclusive because of motion artifact. Plan to do ERCP; however not done because of improvement in his LFTs. During his December stay, where he initially presented with abdominal bloating and weight loss, he was found to have Staphylococcus sciuri bacteremia which was treated with Doxycycline and Daptomycin (with plan to end date on 2/2018 and intention to complete the therapy on with home infusion care). He underwent EGD and biopsy on 12/13/2017 with result showed no evidence of tumor or dysplasia. He also found to have severe duodenal stricture with plan for intervention (AES versus surgically).    For the January 2018 admission, he underwent different investigation for his worsening LFTs. CT abdomen on 1/3/2018 showed interval development of intrahepatic biliary ductal dilation with continued dilatation of CBD, no pancreas lesion. On 1/3/2018 he underwent ERCP which showed Tight duodenal stricture in the post bulbar area with inability to  get the scope to pass the stricture with concern this could be a malignant duodenal lesion. On 1/5/2018, he underwent percutaneous biliary drain despite multiple phases, and possibly due to intrahepatic duct dilation made the procedure difficult. On 1/5/2018 he underwent Based on labs for the last couple of days were T. Bili 19 and Direct Bili 14, Alk Phosph 469 ALT 52 AST 50. Renal function panel is within normal range. I reviewed medical charts from [facility] and summarize his charts.     Hospital Course:  Patient was accepted to hospital medicine as a transfer for AES evaluation for a duodenal stricture.  Upon presentation, patient found to be pancytopenic, although this is chronic due to his diagnosis of MDS.  His TBili was also found to be 27.7, which continues to rise. His vitals were stable, but blood cultures were drawn due to recent history of staph bacteremia.  These have been NGTD.  Patient was on daptomycin at home for the bactermia, which was continued throughout his stay.  Patient underwent EUS and ERCP on 1/8/18, but was unsuccessful as GI was unable to pass the ERCP scope to 2nd portion of the duodenum despite duodenal dilation. A biopsy was taken, and surgical oncology was consulted who recommended a CT with contrast pancreas protocol and a PTC by IR.  They are awaiting biopsy results to determine next step in plan. During the night after the ERCP,  patient was transfused 1 unit of pRBCs per AES recs and began to have hematemesis (300 cc total) close to midnight towards end of transfusion. His SBP was in the 90s/50s, HR 80s.  Patient started on protonix IV and repeat CBC showed a Hb 7.2.  Another unit of pRBCs was given with follow up Hb 7.7. Patient's BP improved, and his mentation was never altered.  Towards the end of the 2nd unit, patient had 2 melenic BMs with no changes in BP.  He continued to deny abdominal pain.  On 1/10/18, patient had another melenic BM and was  transfused 1 unit of pRBCs for Hb 7.0.  He also underwent duodenal stenting and choledochoduodenostomy placement by AES.  Because the pathology result from the initial biopsy did not report malignancy, AES will repeat biopsy.     Interval History: NAEON. Patient's vitals were stable, although he did have one melenic BM this morning.  He was given another unit of pRBCs for Hb 7.0.     Review of Systems   Constitutional: Positive for appetite change, fatigue and unexpected weight change. Negative for activity change, chills, diaphoresis and fever.   HENT: Negative for trouble swallowing.    Eyes: Negative.    Respiratory: Negative.    Cardiovascular: Negative.    Gastrointestinal: Negative for abdominal distention, abdominal pain, anal bleeding, blood in stool, constipation, diarrhea, nausea, rectal pain and vomiting.   Genitourinary: Negative.    Musculoskeletal: Negative.    Skin: Positive for color change.   Neurological: Negative.           Objective:     Vital Signs (Most Recent):  Temp: 98.1 °F (36.7 °C) (01/10/18 1406)  Pulse: 76 (01/10/18 1500)  Resp: 16 (01/10/18 1406)  BP: (!) 150/70 (01/10/18 1406)  SpO2: 99 % (01/10/18 1406) Vital Signs (24h Range):  Temp:  [97.4 °F (36.3 °C)-98.1 °F (36.7 °C)] 98.1 °F (36.7 °C)  Pulse:  [73-85] 76  Resp:  [14-18] 16  SpO2:  [89 %-99 %] 99 %  BP: (121-183)/(58-74) 150/70     Weight: 66.5 kg (146 lb 9.7 oz)  Body mass index is 21.65 kg/m².    Intake/Output Summary (Last 24 hours) at 01/10/18 1618  Last data filed at 01/10/18 0344   Gross per 24 hour   Intake           807.16 ml   Output             1025 ml   Net          -217.84 ml      Physical Exam   Constitutional: He is oriented to person, place, and time. No distress.   Eyes: Scleral icterus is present.   Cardiovascular: Normal rate and regular rhythm.    Pulmonary/Chest: Effort normal. No respiratory distress.   Mild crackles at lung base b/l   Abdominal: Soft. Bowel sounds are normal. He exhibits no distension and no  mass. There is no tenderness. There is no rebound and no guarding.   Musculoskeletal: Normal range of motion. He exhibits no edema.   Neurological: He is alert and oriented to person, place, and time.   Skin: He is not diaphoretic.   jaundiced       Significant Labs:  Recent Results (from the past 24 hour(s))   CBC auto differential    Collection Time: 01/09/18  8:47 PM   Result Value Ref Range    WBC 6.18 3.90 - 12.70 K/uL    RBC 2.37 (L) 4.60 - 6.20 M/uL    Hemoglobin 7.5 (L) 14.0 - 18.0 g/dL    Hematocrit 22.4 (L) 40.0 - 54.0 %    MCV 95 82 - 98 fL    MCH 31.6 (H) 27.0 - 31.0 pg    MCHC 33.5 32.0 - 36.0 g/dL    RDW 21.1 (H) 11.5 - 14.5 %    Platelets 97 (L) 150 - 350 K/uL    MPV 10.3 9.2 - 12.9 fL    Immature Granulocytes 4.0 (H) 0.0 - 0.5 %    Gran # 4.2 1.8 - 7.7 K/uL    Immature Grans (Abs) 0.25 (H) 0.00 - 0.04 K/uL    Lymph # 1.0 1.0 - 4.8 K/uL    Mono # 0.7 0.3 - 1.0 K/uL    Eos # 0.1 0.0 - 0.5 K/uL    Baso # 0.02 0.00 - 0.20 K/uL    nRBC 1 (A) 0 /100 WBC    Gran% 68.2 38.0 - 73.0 %    Lymph% 15.4 (L) 18.0 - 48.0 %    Mono% 11.0 4.0 - 15.0 %    Eosinophil% 1.1 0.0 - 8.0 %    Basophil% 0.3 0.0 - 1.9 %    Differential Method Automated    Comprehensive Metabolic Panel (CMP)    Collection Time: 01/10/18  5:18 AM   Result Value Ref Range    Sodium 140 136 - 145 mmol/L    Potassium 3.9 3.5 - 5.1 mmol/L    Chloride 113 (H) 95 - 110 mmol/L    CO2 20 (L) 23 - 29 mmol/L    Glucose 97 70 - 110 mg/dL    BUN, Bld 41 (H) 8 - 23 mg/dL    Creatinine 1.4 0.5 - 1.4 mg/dL    Calcium 8.0 (L) 8.7 - 10.5 mg/dL    Total Protein 4.7 (L) 6.0 - 8.4 g/dL    Albumin 2.0 (L) 3.5 - 5.2 g/dL    Total Bilirubin 33.7 (H) 0.1 - 1.0 mg/dL    Alkaline Phosphatase 371 (H) 55 - 135 U/L    AST 75 (H) 10 - 40 U/L    ALT 44 10 - 44 U/L    Anion Gap 7 (L) 8 - 16 mmol/L    eGFR if African American 51.9 (A) >60 mL/min/1.73 m^2    eGFR if non African American 44.9 (A) >60 mL/min/1.73 m^2   Magnesium    Collection Time: 01/10/18  5:18 AM   Result Value  Ref Range    Magnesium 1.9 1.6 - 2.6 mg/dL   Phosphorus    Collection Time: 01/10/18  5:18 AM   Result Value Ref Range    Phosphorus 2.0 (L) 2.7 - 4.5 mg/dL   CBC with Automated Differential    Collection Time: 01/10/18  5:18 AM   Result Value Ref Range    WBC 5.05 3.90 - 12.70 K/uL    RBC 2.22 (L) 4.60 - 6.20 M/uL    Hemoglobin 7.0 (L) 14.0 - 18.0 g/dL    Hematocrit 20.9 (L) 40.0 - 54.0 %    MCV 94 82 - 98 fL    MCH 31.5 (H) 27.0 - 31.0 pg    MCHC 33.5 32.0 - 36.0 g/dL    RDW 21.3 (H) 11.5 - 14.5 %    Platelets 92 (L) 150 - 350 K/uL    MPV 10.8 9.2 - 12.9 fL    Immature Granulocytes 4.8 (H) 0.0 - 0.5 %    Gran # 3.6 1.8 - 7.7 K/uL    Immature Grans (Abs) 0.24 (H) 0.00 - 0.04 K/uL    Lymph # 0.5 (L) 1.0 - 4.8 K/uL    Mono # 0.6 0.3 - 1.0 K/uL    Eos # 0.1 0.0 - 0.5 K/uL    Baso # 0.02 0.00 - 0.20 K/uL    nRBC 0 0 /100 WBC    Gran% 71.4 38.0 - 73.0 %    Lymph% 10.3 (L) 18.0 - 48.0 %    Mono% 11.3 4.0 - 15.0 %    Eosinophil% 1.8 0.0 - 8.0 %    Basophil% 0.4 0.0 - 1.9 %    Platelet Estimate Decreased (A)     Aniso Slight     Poly Occasional     Differential Method Automated    Protime-INR    Collection Time: 01/10/18 10:23 AM   Result Value Ref Range    Prothrombin Time 14.2 (H) 9.0 - 12.5 sec    INR 1.4 (H) 0.8 - 1.2           Assessment/Plan:      * Obstructive jaundice    - Extensive workup at [facility] [place] [facility]. Unsuccessful ERCP and Percutaneous biliary drain   - Based on CT abdomen, ERCP at [facility] showed severe duodenal stricture and unable to pass the scope through it   - CA 19-9 >60,000, suggestive of pancreatic or biliary malignancy  - AES consulted and performed EUS & ERCP (1/8/18), which was unsuccessful because were not able to pass the ERCP scope to 2nd portion of the duodenum despite duodenal dilation.  - 1/8 Biopsy was taken and showed atypical cells with mucinous background but no reported malignancy   - Surgical Oncology consulted and awaiting biopsy results before further recs  - CT pancreas  protocol did not show a pancreatic mass but did show wall thickening and dilatation of the 1st part of duodenum and dilatation of the intrahepatic and CBD  - 1/10 AES to perform duodenal stenting + choledochoduodenostomy placement today; will perform another biopsy  - Continue cholestyramine for itching          Melena    - 1 episode of hematemesis and 2 melenic BMs in evening of 1/8  - EUS performed on 1/8 noted clots in gastric fundus without active bleeding  - Per AES, to be expected given findings of clot and to continue to monitor vitals  - Started on IVF, octreotide, protonix IV, and rocephin   - No further episodes of hematemesis but did have a melenic BM this morning 1/10          Hematemesis    See melena            BRAXTON (acute kidney injury)    1/9 Cr increased from 1.2-->1.7 after episodes of hematemesis and melena  Receiving IVF  Cr improved to 1.4          Bacterial infection due to Staphylococcus    - On Decemebr 2017 admission found to have Staphylococcus sciuri bactremia   - Was treated with Doxycycline and Daptomycin (with plan to end date on 2/2018 and intention to complete the therapy on with home infusion care).  - ID consulted and appreciate recs  - Home infusion center called and pt was on daptomycin 430mg IV with end date scheduled for 2/7/18  - Will continue Daptomycin 500mg IV qday with weekly CPK labs        Duodenal stricture    See obstructive jaundice        Benign prostatic hyperplasia with lower urinary tract symptoms    - Stable on Flomax for BPH, will continue         Aortic aneurysm    - Remote history and underwent repair         CAD (coronary artery disease)    - Remote history of CAD and CABG  - Currently stable, no chest pain, on Aspirin and Statin at home  - Held aspirin in setting of GIB        Esophageal dysmotilities    - Underwent EGD, and no anatomic abnormalities in EGD   - Stable on Pantoprazole for symptomatic GERD         MDS (myelodysplastic syndrome)    Diagnosed 18  mos ago.  Patient pancytopenic. On Vidaza at home, but hemo onc consulted and recommend holding in setting of additional malignancy workup.   - Will continue to monitor CBCs and transfuse prn  - Will consider marinol 2.5mg BID in future as appetite stimulant          Essential hypertension    - On Amlodipine 5 mg and Sotalol 40 mg PO BID at home  - Continue amlodipine as BP controlled          VTE Risk Mitigation         Ordered     Medium Risk of VTE  Once      01/06/18 2339     Place SANGITA hose  Until discontinued      01/06/18 2339     Place sequential compression device  Until discontinued      01/06/18 2339              Sahara Nazario MD  Department of Hospital Medicine   [facility]

## 2018-01-10 NOTE — ANESTHESIA POSTPROCEDURE EVALUATION
"Anesthesia Post Evaluation    Patient: Hal Sultana    Procedure(s) Performed: Procedure(s) (LRB):  DRAINAGE-CATHETER-BILIARY (N/A)    Final Anesthesia Type: general  Patient location during evaluation: PACU  Patient participation: Yes- Able to Participate  Level of consciousness: awake and alert and oriented  Post-procedure vital signs: reviewed and stable  Pain management: adequate  Airway patency: patent  PONV status at discharge: No PONV  Anesthetic complications: no      Cardiovascular status: stable  Respiratory status: unassisted, spontaneous ventilation and room air  Hydration status: euvolemic  Follow-up not needed.        Visit Vitals  BP (!) 157/66   Pulse 84   Temp 36.7 °C (98.1 °F) (Temporal)   Resp 16   Ht 5' 9" (1.753 m)   Wt 66.5 kg (146 lb 9.7 oz)   SpO2 100%   BMI 21.65 kg/m²       Pain/Nikhil Score: Pain Assessment Performed: Yes (1/10/2018  5:00 PM)  Presence of Pain: non-verbal indicators absent (1/10/2018  5:00 PM)  Nikhil Score: 5 (1/10/2018  5:00 PM)      "

## 2018-01-10 NOTE — PHYSICIAN QUERY
"PT Name: Hal Sultana  MR #: [id number]    Physician Query Form - Nutrition Clarification     CDS/: Elodia Yost RN                Contact information: [email or web address]    This form is a permanent document in the medical record.     Query Date: January 10, 2018    By submitting this query, we are merely seeking further clarification of documentation.. Please utilize your independent clinical judgment when addressing the question(s) below.    The Medical record contains the following:   Indicators  Supporting Clinical Findings Location in Medical Record    % of Estimated Energy Intake over a time frame from p.o., TF, or TPN     x Weight Status over a time frame Positive for appetite change, fatigue and unexpected weight change    Weight Loss: unintentional  % Weight Change From Usual Weight: -8.02 % Hosp Med PN 1/9        Nutrition CN 1/8    Subcutaneous Fat and/or Muscle Loss      Fluid Accumulation or Edema      Reduced  Strength     x Wt / BMI / Usual Body Weight Height: 5' 9"   Weight: 66.5 kg (146 lb 9.7 oz)  Ideal Body Weight (IBW), Male: 160 lb  BMI (Calculated): 21.7 Nutrition CN 1/8    Delayed Wound Healing / Failure to Thrive     x Acute or Chronic Illness 87 y.o. male with duodenal stenosis and obstructive jaundice who presents for PTC. IR H&P 1/7    Medication     x Treatment  Add Boost Plus ONS (strawberry or vanilla) to aid in caloric intake.  Nutrition PN 1/8   x Other Nutritional support (pre-alb 9)     Path pending, but c/w malinant obstruction of biliary tract and duodenum.  Endoscopic findings noted, d/w Dr. Sanchez.  I would not favor upfront resection for this malnourished octogenarian with refractory jaundice. Hosp Med PN 1/9      Gen Surg CN 1/8     AND / ASPEN Clinical Characteristics (October 2011)  A minimum of two characteristics is recommended for diagnosing either moderate or severe malnutrition   Mild Malnutrition Moderate Malnutrition Severe Malnutrition   Energy Intake " from p.o., TF or TPN. < 75% intake of estimated energy needs for less than 7 days < 75% intake of estimated energy needs for greater than 7 days < 50% intake of estimated energy needs for > 5 days   Weight Loss 1-2% in 1 month  5% in 3 months  7.5% in 6 months  10% in 1 year 1-2 % in 1 week  5% in 1 month  7.5% in 3 months  10% in 6 months  20% in 1 year > 2% in 1 week  > 5% in 1 month  > 7.5% in 3 months  > 10% in 6 months  > 20% in 1 year   Physical Findings     None *Mild subcutaneous fat and/or muscle loss  *Mild fluid accumulation  *Stage II decubitus  *Surgical wound or non-healing wound *Mod/severe subcutaneous fat and/or muscle loss  *Mod/severe fluid accumulation  *Stage III or IV decubitus  *Non-healing surgical wound     Provider, please specify diagnosis or diagnoses associated with above clinical findings.    [ ] Mild Protein-Calorie Malnutrition  [x ] Moderate Protein-Calorie Malnutrition  [ ] Abnormal Weight Loss  [ ] Other Nutritional Diagnosis (please specify): ____________________________________  [ ] Other: ________________________________  [ ] Clinically Undetermined    Please document in your progress notes daily for the duration of treatment until resolved and include in your discharge summary.

## 2018-01-10 NOTE — ASSESSMENT & PLAN NOTE
- Extensive workup at [facility]. Unsuccessful ERCP and Percutaneous biliary drain   - Based on CT abdomen, ERCP at [facility] showed severe duodenal stricture and unable to pass the scope through it   - CA 19-9 >60,000, suggestive of pancreatic or biliary malignancy  - AES consulted and performed EUS & ERCP (1/8/18), which was unsuccessful because were not able to pass the ERCP scope to 2nd portion of the duodenum despite duodenal dilation.  - 1/8 Biopsy was taken and showed atypical cells with mucinous background but no reported malignancy   - Surgical Oncology consulted and awaiting biopsy results before further recs  - CT pancreas protocol did not show a pancreatic mass but did show wall thickening and dilatation of the 1st part of duodenum and dilatation of the intrahepatic and CBD  - 1/10 AES to perform duodenal stenting + choledochoduodenostomy placement today; will perform another biopsy  - Continue cholestyramine for itching

## 2018-01-10 NOTE — PROVATION PATIENT INSTRUCTIONS
Discharge Summary/Instructions after an Endoscopic Procedure  Patient Name: Hal Sultana  Patient MRN: [id number]  Patient YOB: 1930     Wednesday, January 10, 2018  Lance Sanchez MD  RESTRICTIONS:  During your procedure today, you received medications for sedation.  These   medications may affect your judgment, balance and coordination.  Therefore,   for 24 hours, you have the following restrictions:   - DO NOT drive a car, operate machinery, make legal/financial decisions,   sign important papers or drink alcohol.    ACTIVITY:  The following day: return to full activity including work, except no heavy   lifting, straining or running for 3 days if polyps were removed.  DIET:  Eat and drink normally unless instructed otherwise.     TREATMENT FOR COMMON SIDE EFFECTS:  - Mild abdominal pain, belching, bloating or excessive gas: rest, eat   lightly and use a heating pad.  - Sore Throat: treat with throat lozenges and/or gargle with warm salt   water.  SYMPTOMS TO WATCH FOR AND REPORT TO YOUR PHYSICIAN:  1. Abdominal pain or bloating, other than gas cramps.  2. Chest pain.  3. Back pain.  4. Chills or fever occurring within 24 hours after the procedure.  5. Rectal bleeding, which would show as bright red, maroon, or black stools.   (A tablespoon of blood from the rectum is not serious, especially if   hemorrhoids are present.)  6. Vomiting.  7. Weakness or dizziness.  8. Because air was used during the procedure, expelling large amounts of air   from your rectum or belching is normal.  9. If a bowel prep was taken, you may not have a bowel movement for 1-3   days.  This is normal.  GO DIRECTLY TO THE NEAREST EMERGENCY ROOM IF YOU HAVE ANY OF THE FOLLOWING:      Difficulty breathing  Chills and/or fever over 101 F   Persistent vomiting and/or vomiting blood   Severe abdominal pain   Severe chest pain   Black, tarry stools   Bleeding- more than one tablespoon   Any other symptom or condition that you may feel needs  urgent attention  Your doctor recommends these additional instructions:  If any biopsies were taken, your doctor s clinic will contact you in 1 to 2   weeks with any results.  Your physician has recommended an ERCP today.  For questions, problems or results please call your physician - Lance Sanchez MD at Work:  [phone].  [facility], EMERGENCY ROOM PHONE NUMBER: [phone]  IF A COMPLICATION OR EMERGENCY SITUATION ARISES AND YOU ARE UNABLE TO REACH   YOUR PHYSICIAN - GO DIRECTLY TO THE EMERGENCY ROOM.  Lance Sanchez MD  1/10/2018 4:57:50 PM  This report has been verified and signed electronically.

## 2018-01-10 NOTE — ASSESSMENT & PLAN NOTE
1/9 Cr increased from 1.2-->1.7 after episodes of hematemesis and melena  Receiving IVF  Cr improved to 1.4

## 2018-01-10 NOTE — ASSESSMENT & PLAN NOTE
87 year old male with a history of HTN and MDS on who AES is being consulted for obstructive jaundice. His outside Ct, ERCP, and records of failed PTC drained were reviewed.    Path: nondiagnostic, however atypical cells present.     Recommendations:  Proceed with duodenal stenting + choledochoduodenostomy placement today.  Plan explained to patient and daughter who are in agreement.      Keep NPO

## 2018-01-10 NOTE — ANESTHESIA PREPROCEDURE EVALUATION
[facility]  Anesthesia Pre-Operative Evaluation         Patient Name: Hal Sultana  YOB: 1930  MRN: [id number]    SUBJECTIVE:     Pre-operative evaluation for Procedure(s) (LRB):  DRAINAGE-CATHETER-BILIARY (N/A)     01/10/2018    Hal Sultana is a 87 y.o. male w/ a significant PMHx of acquired pancytopenia, aortic aneurysm s/p AAA repair, stable CAD, esophageal dysmotility, gout, hypertension, Myelodysplastic syndrome p/w obstructive jaundice. Patient underwent EUS with FNA biopsy yesterday, and ERCP was attempted but unable to pass through duodenal stricture to cannulate biliary tract for evaluation.    NPO since midnight.    Patient now presents for the above procedure(s).      LDA:   Rt brachial PICC      Prev airway: None documented.    Drips:    sodium chloride 0.9% 100 mL/hr at 01/09/18 1527    octreotide (SANDOSTATIN) infusion 50 mcg/hr (01/09/18 1830)       Patient Active Problem List   Diagnosis    Obstructive jaundice    Essential hypertension    MDS (myelodysplastic syndrome)    Esophageal dysmotilities    Gout    CAD (coronary artery disease)    Aortic aneurysm    Benign prostatic hyperplasia with lower urinary tract symptoms    Duodenal stricture    Bacterial infection due to Staphylococcus    Bacteremia due to Staphylococcus    BRAXTON (acute kidney injury)    Hematemesis    Melena       Review of patient's allergies indicates:   Allergen Reactions    Avelox [moxifloxacin]     Darvocet a500 [propoxyphene n-acetaminophen]     Ibudone [hydrocodone-ibuprofen]     Levaquin [levofloxacin]        Current Inpatient Medications:   amLODIPine  5 mg Oral Daily    cefTRIAXone (ROCEPHIN) IVPB  1 g Intravenous Q24H    cholestyramine  1 packet Oral TID    DAPTOmycin (CUBICIN)  IV  500 mg Intravenous Q24H    montelukast  10 mg Oral Daily    pantoprazole  40 mg Intravenous BID    polyethylene glycol  17 g Oral Daily       No current facility-administered  medications on file prior to encounter.      No current outpatient prescriptions on file prior to encounter.       Past Surgical History:   Procedure Laterality Date    ABDOMINAL AORTIC ANEURYSM REPAIR      CORONARY ARTERY BYPASS GRAFT      ERCP      ESOPHAGOGASTRODUODENOSCOPY  2017       Social History     Social History    Marital status:      Spouse name: N/A    Number of children: N/A    Years of education: N/A     Occupational History    Not on file.     Social History Main Topics    Smoking status: Current Every Day Smoker     Types: Cigarettes    Smokeless tobacco: Not on file    Alcohol use No    Drug use: No    Sexual activity: Not Currently     Other Topics Concern    Not on file     Social History Narrative    No narrative on file       OBJECTIVE:     Vital Signs Range (Last 24H):  Temp:  [36.3 °C (97.4 °F)-36.8 °C (98.3 °F)]   Pulse:  [70-82]   Resp:  [14-18]   BP: (113-143)/(57-67)   SpO2:  [94 %-98 %]       CBC:   Recent Labs      18   2047  01/10/18   0518   WBC  6.18  5.05   RBC  2.37*  2.22*   HGB  7.5*  7.0*   HCT  22.4*  20.9*   PLT  97*  92*   MCV  95  94   MCH  31.6*  31.5*   MCHC  33.5  33.5       CMP:   Recent Labs      18   0750  01/10/18   0518   NA  139  140   K  4.7  3.9   CL  111*  113*   CO2  21*  20*   BUN  44*  41*   CREATININE  1.7*  1.4   GLU  131*  97   MG  1.7  1.9   PHOS  2.8  2.0*   CALCIUM  8.0*  8.0*   ALBUMIN  1.9*  2.0*   PROT  4.5*  4.7*   ALKPHOS  321*  371*   ALT  41  44   AST  60*  75*   BILITOT  34.6*  33.7*       INR:  Recent Labs      18   1210  18   0430   INR  1.2  1.2       Diagnostic Studies: No relevant studies.    EK18  Vent. Rate : 070 BPM     Atrial Rate : 070 BPM     P-R Int : 194 ms          QRS Dur : 170 ms      QT Int : 520 ms       P-R-T Axes : 114 052 148 degrees     QTc Int : 561 ms    Atrial-sensed ventricular-paced rhythm  Abnormal ECG  When compared with ECG of 03-MAR-1999 05:41,  Electronic  ventricular pacemaker has replaced Sinus rhythm    2D ECHO:  No results found for this or any previous visit.      ASSESSMENT/PLAN:         Anesthesia Evaluation    I have reviewed the Patient Summary Reports.    I have reviewed the Nursing Notes.   I have reviewed the Medications.     Review of Systems  Anesthesia Hx:  No problems with previous Anesthesia  History of prior surgery of interest to airway management or planning: Previous anesthesia: General Denies Family Hx of Anesthesia complications.   Denies Personal Hx of Anesthesia complications.   Social:  Former Smoker Quit 1980s   Hematology/Oncology:  Hematology Normal        Cardiovascular:   Exercise tolerance: good Pacemaker Hypertension Past MI CAD  CABG/stent (cabg 1997)   Denies Angina. H/o AAA repair    Walks 3 blocks on occasion   Pulmonary:  Pulmonary Normal    Renal/:  Renal/ Normal     Hepatic/GI:   Obstructive jaundice, duodenal stricture. Denies N/V, tolerating liquids   Neurological:  Neurology Normal    Endocrine:  Endocrine Normal        Physical Exam  General:  Well nourished, Jaundice    Airway/Jaw/Neck:  Airway Findings: Mouth Opening: Normal Tongue: Normal  General Airway Assessment: Adult  Mallampati: II  Improves to I with phonation.  TM Distance: Normal, at least 6 cm  Jaw/Neck Findings:  Neck ROM: Normal ROM      Dental:  Dental Findings: In tact        Mental Status:  Mental Status Findings:  Cooperative, Alert and Oriented         Anesthesia Plan  Type of Anesthesia, risks & benefits discussed:  Anesthesia Type:  general, MAC  Patient's Preference:   Intra-op Monitoring Plan: standard ASA monitors  Intra-op Monitoring Plan Comments:   Post Op Pain Control Plan: per primary service following discharge from PACU  Post Op Pain Control Plan Comments:   Induction:   IV  Beta Blocker:  Patient is not currently on a Beta-Blocker (No further documentation required).       Informed Consent: Patient understands risks and agrees with  Anesthesia plan.  Questions answered. Anesthesia consent signed with patient.  ASA Score: 3     Day of Surgery Review of History & Physical:    H&P update referred to the surgeon.         Ready For Surgery From Anesthesia Perspective.

## 2018-01-10 NOTE — PROGRESS NOTES
[facility] Hospital Medicine  Progress Note    Patient Name: Hal Sultana  MRN: [id number]  Patient Class: IP- Inpatient   Admission Date: 1/7/2018  Length of Stay: 2 days  Attending Physician: Cj Orr MD  Primary Care Provider: Kaycee Ortega MD    [facility] Medicine Team: [facility] HOSP MED 4 Sahara Nazario MD    Subjective:     Principal Problem:Obstructive jaundice    HPI:  This is Mr. Hal Sultana, 87 year old male with PMHx is significant for acquired pancytopenia, aortic aneurysm, stable CAD, esophageal dysmotility, gout, hypertension. Myelodysplastic syndrome. He is a transfer from [place], [place]. He was accepted for transfer by Dr. Sanchez from [facility] ([place] for possible intervention for obstructive jaundice.     He presented to ID clinic on January 1st, 2018 with worsening jaundice and T. Bili reached 19. He was discharged from the same hospital on 12/29/2017 after admission for pruritus and jaundice, underwent MRCP was inconclusive because of motion artifact. Plan to do ERCP; however not done because of improvement in his LFTs. During his December stay, where he initially presented with abdominal bloating and weight loss, he was found to have Staphylococcus sciuri bacteremia which was treated with Doxycycline and Daptomycin (with plan to end date on 2/2018 and intention to complete the therapy on with home infusion care). He underwent EGD and biopsy on 12/13/2017 with result showed no evidence of tumor or dysplasia. He also found to have severe duodenal stricture with plan for intervention (AES versus surgically).    For the January 2018 admission, he underwent different investigation for his worsening LFTs. CT abdomen on 1/3/2018 showed interval development of intrahepatic biliary ductal dilation with continued dilatation of CBD, no pancreas lesion. On 1/3/2018 he underwent ERCP which showed Tight duodenal stricture in the post bulbar area with inability to  get the scope to pass the stricture with concern this could be a malignant duodenal lesion. On 1/5/2018, he underwent percutaneous biliary drain despite multiple phases, and possibly due to intrahepatic duct dilation made the procedure difficult. On 1/5/2018 he underwent Based on labs for the last couple of days were T. Bili 19 and Direct Bili 14, Alk Phosph 469 ALT 52 AST 50. Renal function panel is within normal range. I reviewed medical charts from [facility] and summarize his charts.     Hospital Course:  Patient was accepted to hospital medicine as a transfer for AES evaluation for a duodenal stricture.  Upon presentation, patient found to be pancytopenic, although this is chronic due to his diagnosis of MDS.  His TBili was also found to be 27.7, which continues to rise. His vitals were stable, but blood cultures were drawn due to recent history of staph bacteremia.  These have been NGTD.  Patient was on daptomycin at home for the bactermia, which was continued throughout his stay.  Patient underwent EUS and ERCP on 1/8/18, but was unsuccessful as GI was unable to pass the ERCP scope to 2nd portion of the duodenum despite duodenal dilation. A biopsy was taken, and surgical oncology was consulted who recommended a CT with contrast pancreas protocol and a PTC by IR.  They are awaiting biopsy results to determine next step in plan. During the night after the ERCP,  patient was transfused 1 unit of pRBCs per AES recs and began to have hematemesis (300 cc total) close to midnight towards end of transfusion. His SBP was in the 90s/50s, HR 80s.  Patient started on protonix IV and repeat CBC showed a Hb 7.2.  Another unit of pRBCs was given with follow up Hb 7.7. Patient's BP improved, and his mentation was never altered.  Towards the end of the 2nd unit, patient had 2 melenic BMs with no changes in BP.  He continued to deny abdominal pain.      Interval History: Patient was transfused 1 unit of  pRBCs per AES recs and began to have hematemesis (300 cc total) close to midnight towards end of transfusion.  His SBP was in the 90s/50s, HR 80s.  Patient started on protonix IV and repeat CBC showed a Hb 7.2.  Another unit of pRBCs was given with follow up Hb 7.7. Patient's BP improved, and his mentation was never altered.  Towards the end of the 2nd unit, patient had 2 melenic BMs with no changes in BP. He continues to deny abdominal pain and just reports significant weakness.    Review of Systems   Constitutional: Positive for appetite change, fatigue and unexpected weight change. Negative for activity change, chills, diaphoresis and fever.   HENT: Negative for trouble swallowing.    Eyes: Negative.    Respiratory: Negative.    Cardiovascular: Negative.    Gastrointestinal: Negative for abdominal distention, abdominal pain, anal bleeding, blood in stool, constipation, diarrhea, nausea, rectal pain and vomiting.   Genitourinary: Negative.    Musculoskeletal: Negative.    Skin: Positive for color change.   Neurological: Negative.           Objective:     Vital Signs (Most Recent):  Temp: 98.3 °F (36.8 °C) (01/09/18 1259)  Pulse: 70 (01/09/18 1259)  Resp: 18 (01/09/18 1259)  BP: (!) 113/57 (01/09/18 1259)  SpO2: 98 % (01/09/18 1259) Vital Signs (24h Range):  Temp:  [97.2 °F (36.2 °C)-98.6 °F (37 °C)] 98.3 °F (36.8 °C)  Pulse:  [70-94] 70  Resp:  [15-20] 18  SpO2:  [95 %-98 %] 98 %  BP: ()/(49-74) 113/57     Weight: 66.5 kg (146 lb 9.7 oz)  Body mass index is 21.65 kg/m².    Intake/Output Summary (Last 24 hours) at 01/09/18 1445  Last data filed at 01/09/18 0544   Gross per 24 hour   Intake          1395.92 ml   Output              950 ml   Net           445.92 ml      Physical Exam   Constitutional: He is oriented to person, place, and time. No distress.   Eyes: Scleral icterus is present.   Cardiovascular: Normal rate and regular rhythm.    Pulmonary/Chest: Effort normal. No respiratory distress.   Mild  crackles at lung base b/l   Abdominal: Soft. Bowel sounds are normal. He exhibits no distension and no mass. There is no tenderness. There is no rebound and no guarding.   Musculoskeletal: Normal range of motion. He exhibits no edema.   Neurological: He is alert and oriented to person, place, and time.   Skin: He is not diaphoretic.   jaundiced       Significant Labs:  Recent Results (from the past 24 hour(s))   Prepare RBC 1 Unit    Collection Time: 01/08/18  4:42 PM   Result Value Ref Range    UNIT NUMBER [id number]     PRODUCT CODE F7126V14     DISPENSE STATUS TRANSFUSED     CODING SYSTEM RSYJ066     Unit Blood Type Code 6200     Unit Blood Type A POS     Unit Expiration [id number]    Type & Screen    Collection Time: 01/08/18  6:45 PM   Result Value Ref Range    Group & Rh A POS     Indirect Keiko NEG    Prepare RBC 2 Units; Hematemesis    Collection Time: 01/08/18  6:45 PM   Result Value Ref Range    UNIT NUMBER [id number]     PRODUCT CODE F4546J85     DISPENSE STATUS ISSUED     CODING SYSTEM WRLI378     Unit Blood Type Code 6200     Unit Blood Type A POS     Unit Expiration [id number]     UNIT NUMBER [id number]     PRODUCT CODE L6102A98     DISPENSE STATUS CROSSMATCHED     CODING SYSTEM YHGI825     Unit Blood Type Code 6200     Unit Blood Type A POS     Unit Expiration [id number]    CBC auto differential    Collection Time: 01/08/18 11:55 PM   Result Value Ref Range    WBC 13.42 (H) 3.90 - 12.70 K/uL    RBC 2.23 (L) 4.60 - 6.20 M/uL    Hemoglobin 7.2 (L) 14.0 - 18.0 g/dL    Hematocrit 21.9 (L) 40.0 - 54.0 %    MCV 98 82 - 98 fL    MCH 32.3 (H) 27.0 - 31.0 pg    MCHC 32.9 32.0 - 36.0 g/dL    RDW 20.6 (H) 11.5 - 14.5 %    Platelets 148 (L) 150 - 350 K/uL    MPV 10.8 9.2 - 12.9 fL    Immature Granulocytes 3.2 (H) 0.0 - 0.5 %    Gran # 10.8 (H) 1.8 - 7.7 K/uL    Immature Grans (Abs) 0.43 (H) 0.00 - 0.04 K/uL    Lymph # 1.3 1.0 - 4.8 K/uL    Mono # 0.9 0.3 - 1.0 K/uL    Eos # 0.1 0.0 - 0.5 K/uL     Baso # 0.02 0.00 - 0.20 K/uL    nRBC 0 0 /100 WBC    Gran% 80.2 (H) 38.0 - 73.0 %    Lymph% 9.5 (L) 18.0 - 48.0 %    Mono% 6.6 4.0 - 15.0 %    Eosinophil% 0.4 0.0 - 8.0 %    Basophil% 0.1 0.0 - 1.9 %    Platelet Estimate Appears normal     Aniso Slight     Poik Slight     Poly Occasional     Hypo Occasional     Target Cells Occasional     Basophilic Stippling Occasional     Large/Giant Platelets Present     Differential Method Automated    Comprehensive Metabolic Panel (CMP)    Collection Time: 01/09/18  7:50 AM   Result Value Ref Range    Sodium 139 136 - 145 mmol/L    Potassium 4.7 3.5 - 5.1 mmol/L    Chloride 111 (H) 95 - 110 mmol/L    CO2 21 (L) 23 - 29 mmol/L    Glucose 131 (H) 70 - 110 mg/dL    BUN, Bld 44 (H) 8 - 23 mg/dL    Creatinine 1.7 (H) 0.5 - 1.4 mg/dL    Calcium 8.0 (L) 8.7 - 10.5 mg/dL    Total Protein 4.5 (L) 6.0 - 8.4 g/dL    Albumin 1.9 (L) 3.5 - 5.2 g/dL    Total Bilirubin 34.6 (H) 0.1 - 1.0 mg/dL    Alkaline Phosphatase 321 (H) 55 - 135 U/L    AST 60 (H) 10 - 40 U/L    ALT 41 10 - 44 U/L    Anion Gap 7 (L) 8 - 16 mmol/L    eGFR if African American 41.0 (A) >60 mL/min/1.73 m^2    eGFR if non African American 35.5 (A) >60 mL/min/1.73 m^2   Magnesium    Collection Time: 01/09/18  7:50 AM   Result Value Ref Range    Magnesium 1.7 1.6 - 2.6 mg/dL   Phosphorus    Collection Time: 01/09/18  7:50 AM   Result Value Ref Range    Phosphorus 2.8 2.7 - 4.5 mg/dL   CBC with Automated Differential    Collection Time: 01/09/18  7:50 AM   Result Value Ref Range    WBC 9.60 3.90 - 12.70 K/uL    RBC 2.43 (L) 4.60 - 6.20 M/uL    Hemoglobin 7.7 (L) 14.0 - 18.0 g/dL    Hematocrit 23.2 (L) 40.0 - 54.0 %    MCV 96 82 - 98 fL    MCH 31.7 (H) 27.0 - 31.0 pg    MCHC 33.2 32.0 - 36.0 g/dL    RDW 20.1 (H) 11.5 - 14.5 %    Platelets 121 (L) 150 - 350 K/uL    MPV 11.3 9.2 - 12.9 fL    Immature Granulocytes 3.0 (H) 0.0 - 0.5 %    Gran # 7.4 1.8 - 7.7 K/uL    Immature Grans (Abs) 0.29 (H) 0.00 - 0.04 K/uL    Lymph # 1.1 1.0 -  4.8 K/uL    Mono # 0.8 0.3 - 1.0 K/uL    Eos # 0.0 0.0 - 0.5 K/uL    Baso # 0.02 0.00 - 0.20 K/uL    nRBC 0 0 /100 WBC    Gran% 77.2 (H) 38.0 - 73.0 %    Lymph% 11.5 (L) 18.0 - 48.0 %    Mono% 7.9 4.0 - 15.0 %    Eosinophil% 0.2 0.0 - 8.0 %    Basophil% 0.2 0.0 - 1.9 %    Differential Method Automated    Prealbumin    Collection Time: 01/09/18  7:50 AM   Result Value Ref Range    Prealbumin 9 (L) 20 - 43 mg/dL   AFP tumor marker    Collection Time: 01/09/18  7:50 AM   Result Value Ref Range    AFP 1.0 0.0 - 8.4 ng/mL           Assessment/Plan:      * Obstructive jaundice    - Extensive workup at [facility]. Unsuccessful ERCP and Percutaneous biliary drain   - Based on CT abdomen, ERCP at [facility] showed severe duodenal stricture and unable to pass the scope through it   - CA 19-9 >60,000, suggestive of pancreatic or biliary malignancy  - AES consulted and performed EUS & ERCP (1/8/18), which was unsuccessful because were not able to pass the ERCP scope to 2nd portion of the duodenum despite duodenal dilation.  - Biopsy was taken, f/u  - Surgical Oncology consulted and awaiting biopsy results; if malignancy, will needs staging scans  - Recommended CT pancreas protocol and IR PTC for biliary drainage  - IR would like CT scan prior to potential PTC placement, awaiting CT results  - Added cholestyramine for itching          Melena    See hematemesis          Hematemesis    1 episode of hematemesis and 2 melenic BMs in evening of 1/8  EUS performed on 1/8 noted clots in gastric fundus without active bleeding  Per AES, to be expected given findings of clot and to continue to monitor vitals  Started on IVF, octreotide, protonix IV, and rocephin           BRAXTON (acute kidney injury)    1/9 Cr increased from 1.2-->1.7 after episodes of hematemesis and melena  Will give IVF          Bacterial infection due to Staphylococcus    - On Decemebr 2017 admission found to have Staphylococcus sciuri bactremia   - Was  treated with Doxycycline and Daptomycin (with plan to end date on 2/2018 and intention to complete the therapy on with home infusion care).  - ID consulted and appreciate recs  - Home infusion center called and pt was on daptomycin 430mg IV with end date scheduled for 2/7/18  - Will continue Daptomycin 500mg IV qday with weekly CPK labs     CAD (coronary artery disease)    - Remote history of CAD and CABG  - Currently stable, no chest pain, on Aspirin and Statin at home  - Held aspirin in setting of GIB        Esophageal dysmotilities    - Underwent EGD, and no anatomic abnormalities in EGD   - Stable on Pantoprazole for symptomatic GERD         MDS (myelodysplastic syndrome)    Diagnosed 18 mos ago.  Patient pancytopenic. On Vidaza at home, but hemo onc consulted and recommend holding in setting of additional malignancy workup.   - Will continue to monitor CBCs and transfuse prn          Essential hypertension    - On Amlodipine 5 mg and Sotalol 40 mg PO BID at home  - Pt hypotensive today, BP meds held          VTE Risk Mitigation         Ordered     Medium Risk of VTE  Once      01/06/18 2339     Place SANGITA hose  Until discontinued      01/06/18 2339     Place sequential compression device  Until discontinued      01/06/18 2339              Sahara Nazario MD  Department of Hospital Medicine   [facility]

## 2018-01-10 NOTE — TRANSFER OF CARE
"Anesthesia Transfer of Care Note    Patient: Hal Sultana    Procedure(s) Performed: Procedure(s) (LRB):  DRAINAGE-CATHETER-BILIARY (N/A)    Patient location: [facility]    Anesthesia Type: general    Transport from OR: Transported from OR on 2-3 L/min O2 by NC with adequate spontaneous ventilation    Post pain: adequate analgesia    Post assessment: tolerated procedure well and no apparent anesthetic complications    Post vital signs: stable    Level of consciousness: responds to stimulation and sedated    Nausea/Vomiting: no nausea/vomiting    Complications: none    Transfer of care protocol was followed      Last vitals:   Visit Vitals  /49 (BP Location: Left arm, Patient Position: Lying)   Pulse 70   Temp 36.7 °C (98.1 °F) (Temporal)   Resp 16   Ht 5' 9" (1.753 m)   Wt 66.5 kg (146 lb 9.7 oz)   SpO2 100%   BMI 21.65 kg/m²     "

## 2018-01-10 NOTE — ASSESSMENT & PLAN NOTE
- Extensive workup at [facility]. Unsuccessful ERCP and Percutaneous biliary drain   - Based on CT abdomen, ERCP at [facility] showed severe duodenal stricture and unable to pass the scope through it   - CA 19-9 >60,000, suggestive of pancreatic or biliary malignancy  - AES consulted and performed EUS & ERCP (1/8/18), which was unsuccessful because were not able to pass the ERCP scope to 2nd portion of the duodenum despite duodenal dilation.  - Biopsy was taken, f/u  - Surgical Oncology consulted and awaiting biopsy results; if malignancy, will needs staging scans  - Recommended CT pancreas protocol and IR PTC for biliary drainage  - IR would like CT scan prior to potential PTC placement, awaiting CT results  - Added cholestyramine for itching

## 2018-01-10 NOTE — PROGRESS NOTES
Apparent incision is present on right upper lateral chest. Steristrips, gauze, tegaderm dressing no drainage present but there is no LDA documented for this site and I was told in report that the IR procedure was cancelled so I am unsure of what this dressing is. I will clarify with the nurse on the 10th floor if this was present prior to his procedure today.

## 2018-01-10 NOTE — ASSESSMENT & PLAN NOTE
- 1 episode of hematemesis and 2 melenic BMs in evening of 1/8  - EUS performed on 1/8 noted clots in gastric fundus without active bleeding  - Per AES, to be expected given findings of clot and to continue to monitor vitals  - Started on IVF, octreotide, protonix IV, and rocephin   - No further episodes of hematemesis but did have a melenic BM this morning 1/10

## 2018-01-10 NOTE — PHYSICIAN QUERY
"PT Name: Hal Sultana  MR #: [id number]    Physician Query Form - Hematology Clarification      CDS/: Elodia Yost RN                Contact information:[email or web address]    This form is a permanent document in the medical record.      Query Date: January 10, 2018    By submitting this query, we are merely seeking further clarification of documentation. Please utilize your independent clinical judgment when addressing the question(s) below.    The Medical record contains the following:   Indicators  Supporting Clinical Findings Location in Medical Record    "Anemia" documented     x H & H = Hgb 8.0>>7.0  Hct 24.4>>20.9 Lab 1/7-1/10    BP =                     HR=     x "GI bleeding" documented 1 episode of hematemesis and 2 melenic BMs in evening of 1/8  EUS performed on 1/8 noted clots in gastric fundus without active bleeding  Per AES, to be expected given findings of clot and to continue to monitor vitals Hosp Med PN 1/9    Acute bleeding (Non GI site)     x Transfusion(s) 2 units prbc Blood Bank 1/8   x Treatment:  f/u Hct after 2u pRBCs    Patient started on protonix IV and repeat CBC showed a Hb 7.2.    Started on IVF, octreotide, protonix IV, and rocephin  Hosp Med PN 1/9    Other:        Provider, please specify diagnosis or diagnoses associated with above clinical findings.    [  ] Acute blood loss anemia expected post-operatively  [x  ] Acute blood loss anemia  [  ] Iron deficiency anemia  [  ] Chronic blood loss anemia  [  ] Anemia of chronic disease ( Specify chronic disease)    [  ] Other (Specify) _______________________________     [  ] Clinically Undetermined     [  ] Other Hematological Diagnosis (please specify): _________________________________    [  ] Clinically Undetermined       Please document in your progress notes daily for the duration of treatment, until resolved, and include in your discharge summary.                                                                              "

## 2018-01-11 PROBLEM — N17.9 AKI (ACUTE KIDNEY INJURY): Status: RESOLVED | Noted: 2018-01-09 | Resolved: 2018-01-11

## 2018-01-11 LAB
ALBUMIN SERPL BCP-MCNC: 1.6 G/DL
ALBUMIN SERPL BCP-MCNC: 1.9 G/DL
ALP SERPL-CCNC: 337 U/L
ALP SERPL-CCNC: 348 U/L
ALT SERPL W/O P-5'-P-CCNC: 35 U/L
ALT SERPL W/O P-5'-P-CCNC: 36 U/L
ANION GAP SERPL CALC-SCNC: 8 MMOL/L
ANION GAP SERPL CALC-SCNC: 9 MMOL/L
AST SERPL-CCNC: 49 U/L
AST SERPL-CCNC: 53 U/L
BASOPHILS # BLD AUTO: 0.02 K/UL
BASOPHILS NFR BLD: 0.3 %
BASOPHILS NFR BLD: 0.3 %
BASOPHILS NFR BLD: 0.4 %
BILIRUB SERPL-MCNC: 27.1 MG/DL
BILIRUB SERPL-MCNC: 28.7 MG/DL
BUN SERPL-MCNC: 23 MG/DL
BUN SERPL-MCNC: 24 MG/DL
CALCIUM SERPL-MCNC: 7 MG/DL
CALCIUM SERPL-MCNC: 8 MG/DL
CHLORIDE SERPL-SCNC: 108 MMOL/L
CHLORIDE SERPL-SCNC: 118 MMOL/L
CO2 SERPL-SCNC: 17 MMOL/L
CO2 SERPL-SCNC: 19 MMOL/L
CREAT SERPL-MCNC: 1.2 MG/DL
CREAT SERPL-MCNC: 1.2 MG/DL
DIFFERENTIAL METHOD: ABNORMAL
EOSINOPHIL # BLD AUTO: 0.1 K/UL
EOSINOPHIL # BLD AUTO: 0.2 K/UL
EOSINOPHIL # BLD AUTO: 0.2 K/UL
EOSINOPHIL NFR BLD: 2.2 %
EOSINOPHIL NFR BLD: 2.3 %
EOSINOPHIL NFR BLD: 3.2 %
ERYTHROCYTE [DISTWIDTH] IN BLOOD BY AUTOMATED COUNT: 21.3 %
ERYTHROCYTE [DISTWIDTH] IN BLOOD BY AUTOMATED COUNT: 21.6 %
ERYTHROCYTE [DISTWIDTH] IN BLOOD BY AUTOMATED COUNT: 21.8 %
EST. GFR  (AFRICAN AMERICAN): >60 ML/MIN/1.73 M^2
EST. GFR  (AFRICAN AMERICAN): >60 ML/MIN/1.73 M^2
EST. GFR  (NON AFRICAN AMERICAN): 54 ML/MIN/1.73 M^2
EST. GFR  (NON AFRICAN AMERICAN): 54 ML/MIN/1.73 M^2
GLUCOSE SERPL-MCNC: 101 MG/DL
GLUCOSE SERPL-MCNC: 63 MG/DL
HCT VFR BLD AUTO: 23.5 %
HCT VFR BLD AUTO: 23.9 %
HCT VFR BLD AUTO: 26.2 %
HGB BLD-MCNC: 7.8 G/DL
HGB BLD-MCNC: 8.1 G/DL
HGB BLD-MCNC: 8.9 G/DL
IMM GRANULOCYTES # BLD AUTO: 0.23 K/UL
IMM GRANULOCYTES # BLD AUTO: 0.25 K/UL
IMM GRANULOCYTES # BLD AUTO: 0.29 K/UL
IMM GRANULOCYTES NFR BLD AUTO: 3.7 %
IMM GRANULOCYTES NFR BLD AUTO: 4.2 %
IMM GRANULOCYTES NFR BLD AUTO: 4.5 %
LYMPHOCYTES # BLD AUTO: 0.6 K/UL
LYMPHOCYTES # BLD AUTO: 0.7 K/UL
LYMPHOCYTES # BLD AUTO: 0.8 K/UL
LYMPHOCYTES NFR BLD: 10 %
LYMPHOCYTES NFR BLD: 11.1 %
LYMPHOCYTES NFR BLD: 11.8 %
MAGNESIUM SERPL-MCNC: 1.2 MG/DL
MAGNESIUM SERPL-MCNC: 2.1 MG/DL
MAGNESIUM SERPL-MCNC: 9.9 MG/DL
MCH RBC QN AUTO: 31 PG
MCH RBC QN AUTO: 31.5 PG
MCH RBC QN AUTO: 31.8 PG
MCHC RBC AUTO-ENTMCNC: 33.2 G/DL
MCHC RBC AUTO-ENTMCNC: 33.9 G/DL
MCHC RBC AUTO-ENTMCNC: 34 G/DL
MCV RBC AUTO: 93 FL
MCV RBC AUTO: 93 FL
MCV RBC AUTO: 94 FL
MONOCYTES # BLD AUTO: 0.7 K/UL
MONOCYTES # BLD AUTO: 0.7 K/UL
MONOCYTES # BLD AUTO: 0.8 K/UL
MONOCYTES NFR BLD: 11.6 %
MONOCYTES NFR BLD: 11.7 %
MONOCYTES NFR BLD: 11.8 %
NEUTROPHILS # BLD AUTO: 3.8 K/UL
NEUTROPHILS # BLD AUTO: 4.5 K/UL
NEUTROPHILS # BLD AUTO: 4.8 K/UL
NEUTROPHILS NFR BLD: 68.5 %
NEUTROPHILS NFR BLD: 70.5 %
NEUTROPHILS NFR BLD: 71.9 %
NRBC BLD-RTO: 0 /100 WBC
NRBC BLD-RTO: 0 /100 WBC
NRBC BLD-RTO: 1 /100 WBC
PHOSPHATE SERPL-MCNC: 1.2 MG/DL
PHOSPHATE SERPL-MCNC: 3 MG/DL
PLATELET # BLD AUTO: 83 K/UL
PLATELET # BLD AUTO: 88 K/UL
PLATELET # BLD AUTO: 99 K/UL
PMV BLD AUTO: 10.5 FL
PMV BLD AUTO: 10.6 FL
PMV BLD AUTO: 10.9 FL
POCT GLUCOSE: 95 MG/DL (ref 70–110)
POTASSIUM SERPL-SCNC: 2.6 MMOL/L
POTASSIUM SERPL-SCNC: 3.3 MMOL/L
PROT SERPL-MCNC: 4.2 G/DL
PROT SERPL-MCNC: 4.8 G/DL
RBC # BLD AUTO: 2.52 M/UL
RBC # BLD AUTO: 2.57 M/UL
RBC # BLD AUTO: 2.8 M/UL
SODIUM SERPL-SCNC: 136 MMOL/L
SODIUM SERPL-SCNC: 143 MMOL/L
WBC # BLD AUTO: 5.58 K/UL
WBC # BLD AUTO: 6.21 K/UL
WBC # BLD AUTO: 6.86 K/UL

## 2018-01-11 PROCEDURE — 36415 COLL VENOUS BLD VENIPUNCTURE: CPT

## 2018-01-11 PROCEDURE — 63600175 PHARM REV CODE 636 W HCPCS: Performed by: STUDENT IN AN ORGANIZED HEALTH CARE EDUCATION/TRAINING PROGRAM

## 2018-01-11 PROCEDURE — 80053 COMPREHEN METABOLIC PANEL: CPT

## 2018-01-11 PROCEDURE — 25000003 PHARM REV CODE 250: Performed by: STUDENT IN AN ORGANIZED HEALTH CARE EDUCATION/TRAINING PROGRAM

## 2018-01-11 PROCEDURE — 97530 THERAPEUTIC ACTIVITIES: CPT

## 2018-01-11 PROCEDURE — C9113 INJ PANTOPRAZOLE SODIUM, VIA: HCPCS | Performed by: STUDENT IN AN ORGANIZED HEALTH CARE EDUCATION/TRAINING PROGRAM

## 2018-01-11 PROCEDURE — 84100 ASSAY OF PHOSPHORUS: CPT

## 2018-01-11 PROCEDURE — 20600001 HC STEP DOWN PRIVATE ROOM

## 2018-01-11 PROCEDURE — 83735 ASSAY OF MAGNESIUM: CPT | Mod: 91

## 2018-01-11 PROCEDURE — 84100 ASSAY OF PHOSPHORUS: CPT | Mod: 91

## 2018-01-11 PROCEDURE — 63600175 PHARM REV CODE 636 W HCPCS: Mod: JG | Performed by: STUDENT IN AN ORGANIZED HEALTH CARE EDUCATION/TRAINING PROGRAM

## 2018-01-11 PROCEDURE — 83735 ASSAY OF MAGNESIUM: CPT

## 2018-01-11 PROCEDURE — 99233 SBSQ HOSP IP/OBS HIGH 50: CPT | Mod: ,,, | Performed by: INTERNAL MEDICINE

## 2018-01-11 PROCEDURE — 85025 COMPLETE CBC W/AUTO DIFF WBC: CPT | Mod: 91

## 2018-01-11 PROCEDURE — 63600175 PHARM REV CODE 636 W HCPCS: Performed by: HOSPITALIST

## 2018-01-11 PROCEDURE — 80053 COMPREHEN METABOLIC PANEL: CPT | Mod: 91

## 2018-01-11 PROCEDURE — 25000003 PHARM REV CODE 250: Performed by: HOSPITALIST

## 2018-01-11 RX ORDER — DRONABINOL 2.5 MG/1
2.5 CAPSULE ORAL 2 TIMES DAILY
Status: DISCONTINUED | OUTPATIENT
Start: 2018-01-11 | End: 2018-01-12 | Stop reason: HOSPADM

## 2018-01-11 RX ORDER — DRONABINOL 2.5 MG/1
2.5 CAPSULE ORAL
Qty: 60 CAPSULE | Refills: 2 | Status: SHIPPED | OUTPATIENT
Start: 2018-01-11 | End: 2018-01-12

## 2018-01-11 RX ORDER — PANTOPRAZOLE SODIUM 40 MG/1
40 TABLET, DELAYED RELEASE ORAL
Status: DISCONTINUED | OUTPATIENT
Start: 2018-01-11 | End: 2018-01-12 | Stop reason: HOSPADM

## 2018-01-11 RX ORDER — DIPHENHYDRAMINE HCL 25 MG
25 CAPSULE ORAL EVERY 6 HOURS PRN
Refills: 0 | COMMUNITY
Start: 2018-01-11

## 2018-01-11 RX ORDER — CHOLESTYRAMINE 4 G/9G
1 POWDER, FOR SUSPENSION ORAL 3 TIMES DAILY
Qty: 270 PACKET | Refills: 0 | Status: SHIPPED | OUTPATIENT
Start: 2018-01-11 | End: 2018-01-22

## 2018-01-11 RX ORDER — MAGNESIUM SULFATE HEPTAHYDRATE 40 MG/ML
2 INJECTION, SOLUTION INTRAVENOUS
Status: COMPLETED | OUTPATIENT
Start: 2018-01-11 | End: 2018-01-11

## 2018-01-11 RX ORDER — POTASSIUM CHLORIDE 20 MEQ/15ML
40 SOLUTION ORAL ONCE
Status: DISCONTINUED | OUTPATIENT
Start: 2018-01-11 | End: 2018-01-11

## 2018-01-11 RX ORDER — NAPROXEN SODIUM 220 MG/1
81 TABLET, FILM COATED ORAL DAILY
Refills: 0 | COMMUNITY
Start: 2018-01-11

## 2018-01-11 RX ORDER — SOTALOL HYDROCHLORIDE 80 MG/1
40 TABLET ORAL 2 TIMES DAILY
Status: DISCONTINUED | OUTPATIENT
Start: 2018-01-11 | End: 2018-01-12 | Stop reason: HOSPADM

## 2018-01-11 RX ADMIN — MONTELUKAST SODIUM 10 MG: 10 TABLET, FILM COATED ORAL at 10:01

## 2018-01-11 RX ADMIN — ALTEPLASE 2 MG: 2.2 INJECTION, POWDER, LYOPHILIZED, FOR SOLUTION INTRAVENOUS at 10:01

## 2018-01-11 RX ADMIN — DRONABINOL 2.5 MG: 2.5 CAPSULE ORAL at 09:01

## 2018-01-11 RX ADMIN — SOTALOL HYDROCHLORIDE 40 MG: 80 TABLET ORAL at 09:01

## 2018-01-11 RX ADMIN — OCTREOTIDE ACETATE 50 MCG/HR: 1000 INJECTION, SOLUTION INTRAVENOUS; SUBCUTANEOUS at 05:01

## 2018-01-11 RX ADMIN — CHOLESTYRAMINE 4 G: 4 POWDER, FOR SUSPENSION ORAL at 01:01

## 2018-01-11 RX ADMIN — CHOLESTYRAMINE 4 G: 4 POWDER, FOR SUSPENSION ORAL at 09:01

## 2018-01-11 RX ADMIN — MAGNESIUM SULFATE IN WATER 2 G: 40 INJECTION, SOLUTION INTRAVENOUS at 01:01

## 2018-01-11 RX ADMIN — PANTOPRAZOLE SODIUM 40 MG: 40 INJECTION, POWDER, FOR SOLUTION INTRAVENOUS at 10:01

## 2018-01-11 RX ADMIN — PANTOPRAZOLE SODIUM 40 MG: 40 TABLET, DELAYED RELEASE ORAL at 04:01

## 2018-01-11 RX ADMIN — AMLODIPINE BESYLATE 5 MG: 5 TABLET ORAL at 10:01

## 2018-01-11 RX ADMIN — MAGNESIUM SULFATE IN WATER 2 G: 40 INJECTION, SOLUTION INTRAVENOUS at 11:01

## 2018-01-11 RX ADMIN — CHOLESTYRAMINE 4 G: 4 POWDER, FOR SUSPENSION ORAL at 05:01

## 2018-01-11 RX ADMIN — POTASSIUM PHOSPHATE, MONOBASIC AND POTASSIUM PHOSPHATE, DIBASIC 40 MMOL: 224; 236 INJECTION, SOLUTION INTRAVENOUS at 10:01

## 2018-01-11 RX ADMIN — DAPTOMYCIN 500 MG: 500 INJECTION, POWDER, LYOPHILIZED, FOR SOLUTION INTRAVENOUS at 12:01

## 2018-01-11 NOTE — PT/OT/SLP PROGRESS
Physical Therapy Treatment    Patient Name:  Hal Sultana   MRN:  [id number]    Recommendations:     Discharge Recommendations:  nursing facility, skilled   Discharge Equipment Recommendations: walker, rolling, bath bench   Barriers to discharge: significant impaired tolerance to activity    Assessment:     Hal Sultana is a 87 y.o. male admitted with a medical diagnosis of Obstructive jaundice.  He presents with the following impairments/functional limitations:  weakness, impaired endurance, impaired self care skills, impaired functional mobilty, gait instability, impaired balance, decreased lower extremity function, decreased upper extremity function, pain. Fair tolerance to PT session. Able to ambulate within room with CGA using rolling walker; however, after a few steps requested to return to bed secondary to significant fatigue. Declined further participation with therapy services. Educated on and demonstrated understanding of seated LE HEP to be performed. Patient's son verbalized understanding. To benefit from continued skilled intervention to address deficits. DC rec's changed to SNF secondary to poor endurance and tolerance to activity compared to self-reported Indep PTA.    Rehab Prognosis:  Good; patient would benefit from acute skilled PT services to address these deficits and reach maximum level of function.      Recent Surgery: Procedure(s) (LRB):  ESOPHAGOGASTRODUODENOSCOPY (EGD) (N/A)  ULTRASOUND-ENDOSCOPIC-UPPER (N/A) 1 Day Post-Op    Plan:     During this hospitalization, patient to be seen 3 x/week to address the above listed problems via gait training, therapeutic activities, neuromuscular re-education, therapeutic exercises  · Plan of Care Expires:  02/09/18   Plan of Care Reviewed with: patient, family    Subjective     Communicated with RN prior to session.  Patient found supine with family present upon PT entry to room, agreeable to treatment.      Chief Complaint: Fatigue  Patient  comments/goals: slight dizziness when standing EOB  Pain/Comfort:  · Pain Rating 1:  (reports R-sided abdominal discomfort not rated with VAS)    Patients cultural, spiritual, Latter day conflicts given the current situation: none stated    Objective:     Patient found with: peripheral IV     General Precautions: Standard, fall   Orthopedic Precautions:N/A   Braces: N/A     Functional Mobility:  · Bed Mobility:  Rolling Left:  modified independence  · Scooting: modified independence  · Supine to Sit: modified independence  · Sit to Supine: modified independence  · Transfers:  Sit to Stand:  contact guard assistance with rolling walker  · Gait: 14ftx1 CGA using rolling walker. No marked gait deviations; however, limited by significant fatigue  · Balance: no LOB with ambulation      AM-PAC 6 CLICK MOBILITY  Turning over in bed (including adjusting bedclothes, sheets and blankets)?: 4  Sitting down on and standing up from a chair with arms (e.g., wheelchair, bedside commode, etc.): 3  Moving from lying on back to sitting on the side of the bed?: 4  Moving to and from a bed to a chair (including a wheelchair)?: 3  Need to walk in hospital room?: 3  Climbing 3-5 steps with a railing?: 1  Total Score: 18       Therapeutic Activities and Exercises:   Patient educated on:   - role of PT/POC    - safety with all functional mobility   - bed mobility training   - transfer training   - gait training with rolling walker   - deep breathing techniques   - demonstration of seated LE therex   - importance of participation with therapy services   - safest to transfer with rolling walker and 1 person assist at this time.    Patient and son erbalized understanding of all education provided.    Declined further participation after ambulation secondary to fatigue and returned to supine position with Mod I.     Per brother will perform LE therex this afternoon. LE exercise recall to be assessed next session.      Patient left supine with  all lines intact, call button in reach, RN notified and brother present..    GOALS:    Physical Therapy Goals        Problem: Physical Therapy Goal    Goal Priority Disciplines Outcome Goal Variances Interventions   Physical Therapy Goal     PT/OT, PT Ongoing (interventions implemented as appropriate)     Description:  Goals to be met by: 18    Patient will increase functional independence with mobility by performin. Sit to stand transfer with Stand-by Assistance using rolling walker  2. Bed to chair transfer with Stand-by Assistance using Rolling Walker  3. Gait  x 100 feet with Stand-by Assistance using Rolling Walker.                        Time Tracking:     PT Received On: 18  PT Start Time: 1200     PT Stop Time: 1209  PT Total Time (min): 9 min     Billable Minutes: Therapeutic Activity 9    Treatment Type: Treatment  PT/PTA: PT           Levi Boone III, PT  2018

## 2018-01-11 NOTE — PROGRESS NOTES
Patient's HR sustaining 110's to low 120's. Dr. Bob notified. Order to check BG. BG was 95. Patient resting comfortably. WCTM.

## 2018-01-11 NOTE — PLAN OF CARE
[facility]    HOME HEALTH ORDERS  FACE TO FACE ENCOUNTER    Patient Name: Hal Sultana  YOB: 1930    PCP: Kaycee Ortega MD   PCP Address: [address] [place] [address] [facility] / [facility] [address]  PCP Phone Number: [phone]  PCP Fax: [phone]    Encounter Date: 01/11/2018    Admit to Home Health    Diagnoses:  Active Hospital Problems    Diagnosis  POA    *Obstructive jaundice [K83.8]  Yes     Priority: 1 - High    Benign prostatic hyperplasia with lower urinary tract symptoms [N40.1]  Yes     Priority: 4      Chronic    BRAXTON (acute kidney injury) [N17.9]  Yes    Hematemesis [K92.0]  Yes    Melena [K92.1]  Yes    Duodenal stricture [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       No future appointments.  Follow-up Information     [facility]. Schedule an appointment as soon as possible for a visit in 1 week.    Specialty:  Hematology and Oncology  Contact information:  [facility] [id number] [phone]  Additional information:  [facility] - 3rd Floor                   I have seen and examined this patient face to face today. My clinical findings that support the need for the home health skilled services and home bound status are the following:  Weakness/numbness causing balance and gait disturbance due to Malignancy/Cancer making it taxing to leave home.    Allergies:  Review of patient's allergies indicates:   Allergen Reactions    Avelox [moxifloxacin]     Darvocet a500 [propoxyphene n-acetaminophen]     Ibudone [hydrocodone-ibuprofen]      Levaquin [levofloxacin]        Diet: regular diet    Activities: activity as tolerated    Nursing:   SN to complete comprehensive assessment including routine vital signs. Instruct on disease process and s/s of complications to report to MD. Review/verify medication list sent home with the patient at time of discharge  and instruct patient/caregiver as needed. Frequency may be adjusted depending on start of care date.    Notify MD if SBP > 160 or < 90; DBP > 90 or < 50; HR > 120 or < 50; Temp > 101; Other:         CONSULTS:    Physical Therapy to evaluate and treat. Evaluate for home safety and equipment needs; Establish/upgrade home exercise program. Perform / instruct on therapeutic exercises, gait training, transfer training, and Range of Motion.  Occupational Therapy to evaluate and treat. Evaluate home environment for safety and equipment needs. Perform/Instruct on transfers, ADL training, ROM, and therapeutic exercises.    MISCELLANEOUS CARE:  N/A    WOUND CARE ORDERS   n/a      Medications: Review discharge medications with patient and family and provide education.      Current Discharge Medication List      START taking these medications    Details   cholestyramine (QUESTRAN) 4 gram packet Take 1 packet (4 g total) by mouth 3 (three) times daily.  Qty: 270 packet, Refills: 0      diphenhydrAMINE (BENADRYL) 25 mg capsule Take 1 each (25 mg total) by mouth every 6 (six) hours as needed for Itching.  Refills: 0      dronabinol (MARINOL) 2.5 MG capsule Take 1 capsule (2.5 mg total) by mouth 2 (two) times daily before meals.  Qty: 60 capsule, Refills: 2         CONTINUE these medications which have CHANGED    Details   aspirin 81 MG Chew Take 1 tablet (81 mg total) by mouth once daily. HOLD UNTIL INSTRUCTED BY PCP  Refills: 0         CONTINUE these medications which have NOT CHANGED    Details   albuterol 2 mg/5 mL syrup Take 2.5 mg by mouth every 4 (four) hours as needed.      amLODIPine (NORVASC) 5 MG tablet  Take 5 mg by mouth every evening.      finasteride (PROSCAR) 5 mg tablet Take 5 mg by mouth every other day.      fluticasone (FLONASE) 50 mcg/actuation nasal spray 2 sprays by Each Nare route daily as needed for Rhinitis.      montelukast (SINGULAIR) 10 mg tablet Take 10 mg by mouth every evening.      nitroGLYCERIN 0.4 MG/DOSE TL SPRY (NITROLINGUAL) 400 mcg/spray spray Place 1 spray under the tongue every 5 (five) minutes as needed for Chest pain.      omeprazole (PRILOSEC) 40 MG capsule Take 40 mg by mouth every evening.      polyethylene glycol (GLYCOLAX) 17 gram PwPk Take 17 g by mouth daily as needed (constipation).       sotalol (BETAPACE) 40 MG tablet Take 40 mg by mouth 2 (two) times daily.      tamsulosin (FLOMAX) 0.4 mg Cp24 Take 0.4 mg by mouth every evening.      tiotropium (SPIRIVA) 18 mcg inhalation capsule Inhale 18 mcg into the lungs once daily. Controller         STOP taking these medications       loratadine-pseudoephedrine 5-120 mg (CLARITIN-D 12-HOUR) 5-120 mg per tablet Comments:   Reason for Stopping:         pravastatin (PRAVACHOL) 20 MG tablet Comments:   Reason for Stopping:         tolterodine (DETROL LA) 4 MG 24 hr capsule Comments:   Reason for Stopping:             I certify that this patient is confined to his home and needs intermittent skilled nursing care, physical therapy and occupational therapy.

## 2018-01-11 NOTE — ASSESSMENT & PLAN NOTE
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

## 2018-01-11 NOTE — SUBJECTIVE & OBJECTIVE
Interval history: s/p placement of biliary stent and duodenal stent yesterday 1/10/18.   No acute events overnight. Labs not reported yet. Continues to be jaundice. NPO. Octreotide gtt.   Review of patient's allergies indicates:   Allergen Reactions    Avelox [moxifloxacin]     Darvocet a500 [propoxyphene n-acetaminophen]     Ibudone [hydrocodone-ibuprofen]     Levaquin [levofloxacin]        Past Medical History:   Diagnosis Date    AAA (abdominal aortic aneurysm)     BPH (benign prostatic hyperplasia)     Duodenal stricture     Hypertension      Past Surgical History:   Procedure Laterality Date    ABDOMINAL AORTIC ANEURYSM REPAIR      CORONARY ARTERY BYPASS GRAFT      ERCP      ESOPHAGOGASTRODUODENOSCOPY  12/13/2017     Family History     Problem Relation (Age of Onset)    No Known Problems Mother, Father        Social History Main Topics    Smoking status: Current Every Day Smoker     Types: Cigarettes    Smokeless tobacco: Never Used    Alcohol use No    Drug use: No    Sexual activity: Not Currently     Review of Systems   Constitutional: Positive for activity change, appetite change, fatigue and fever.   HENT: Negative for congestion and drooling.    Eyes: Negative for pain, redness and itching.   Respiratory: Negative for cough and shortness of breath.    Cardiovascular: Negative for chest pain.   Gastrointestinal: Positive for abdominal distention and nausea. Negative for abdominal pain.   Endocrine: Negative for cold intolerance and heat intolerance.   Genitourinary: Negative for dysuria.   Musculoskeletal: Negative for back pain and gait problem.   Skin: Positive for color change. Negative for wound.   Allergic/Immunologic: Negative for environmental allergies and immunocompromised state.   Neurological: Negative for dizziness.   Hematological: Negative for adenopathy.   Psychiatric/Behavioral: Negative for agitation and confusion.     Objective:     Vital Signs (Most Recent):  Temp: 99.5  °F (37.5 °C) (01/11/18 0804)  Pulse: (!) 112 (01/11/18 0804)  Resp: 18 (01/11/18 0804)  BP: 131/70 (01/11/18 0804)  SpO2: 98 % (01/11/18 0804) Vital Signs (24h Range):  Temp:  [97.6 °F (36.4 °C)-100.2 °F (37.9 °C)] 99.5 °F (37.5 °C)  Pulse:  [] 112  Resp:  [15-20] 18  SpO2:  [89 %-100 %] 98 %  BP: (122-183)/(41-74) 131/70     Weight: 66.5 kg (146 lb 9.7 oz)  Body mass index is 21.65 kg/m².    Physical Exam   Constitutional: He is oriented to person, place, and time. He appears well-developed and well-nourished. No distress.   jaundiced   HENT:   Head: Normocephalic and atraumatic.   Eyes: Conjunctivae are normal. Right eye exhibits no discharge. Left eye exhibits no discharge. Scleral icterus is present.   Neck: Normal range of motion. Neck supple. No JVD present. No tracheal deviation present.   Cardiovascular: Normal rate.    Pulmonary/Chest: Effort normal. No respiratory distress.   Abdominal: Soft. He exhibits no distension. There is no tenderness.   Well healed scar from CABG, open mary, and open AAA repair   Neurological: He is alert and oriented to person, place, and time.   Skin: Skin is warm and dry. No rash noted. He is not diaphoretic. No erythema.       Significant Labs:  CBC:     Recent Labs  Lab 01/10/18  2137   WBC 4.78  4.78   RBC 2.57*  2.57*   HGB 8.0*  8.0*   HCT 24.0*  24.0*   PLT 85*  85*   MCV 93  93   MCH 31.1*  31.1*   MCHC 33.3  33.3     CMP:     Recent Labs  Lab 01/10/18  0518   GLU 97   CALCIUM 8.0*   ALBUMIN 2.0*   PROT 4.7*      K 3.9   CO2 20*   *   BUN 41*   CREATININE 1.4   ALKPHOS 371*   ALT 44   AST 75*   BILITOT 33.7*     Coagulation:     Recent Labs  Lab 01/10/18  1023   LABPROT 14.2*   INR 1.4*

## 2018-01-11 NOTE — ASSESSMENT & PLAN NOTE
- On Decemebr 2017 admission found to have Staphylococcus sciuri bactremia   - Was treated with Doxycycline and Daptomycin (with plan to end date on 2/2018 and intention to complete the therapy on with home infusion care).  - ID consulted and appreciate recs  - Home infusion center called and pt was on daptomycin 430mg IV with end date scheduled for 2/7/18  - Will continue Daptomycin 500mg IV qday with weekly CPK labs  - Per ID dapto stop date 1/12, may have been contaminant as was only 1/4 Cx   - Will need repeat blood cultures as outpatient

## 2018-01-11 NOTE — SUBJECTIVE & OBJECTIVE
Interval History: NAEON. Stent placed for obstruction. Reports doing well but has had significant weakness since admission     Review of Systems   Constitutional: Positive for fatigue. Negative for chills and fever.   HENT: Negative for congestion and rhinorrhea.    Eyes: Negative for discharge and redness.   Respiratory: Negative for cough and shortness of breath.    Cardiovascular: Negative for chest pain and palpitations.   Gastrointestinal: Negative for diarrhea, nausea and vomiting.   Genitourinary: Negative for dysuria and frequency.   Musculoskeletal: Negative for arthralgias and myalgias.   Skin: Positive for color change. Negative for rash.   Neurological: Positive for weakness. Negative for headaches.      Objective:     Vital Signs (Most Recent):  Temp: 98.1 °F (36.7 °C) (01/11/18 1547)  Pulse: (!) 115 (01/11/18 1547)  Resp: 18 (01/11/18 1547)  BP: 127/70 (01/11/18 1547)  SpO2: 98 % (01/11/18 1547) Vital Signs (24h Range):  Temp:  [97.8 °F (36.6 °C)-100.2 °F (37.9 °C)] 98.1 °F (36.7 °C)  Pulse:  [] 115  Resp:  [16-20] 18  SpO2:  [96 %-100 %] 98 %  BP: (122-167)/(41-73) 127/70     Weight: 66.5 kg (146 lb 9.7 oz)  Body mass index is 21.65 kg/m².    Intake/Output Summary (Last 24 hours) at 01/11/18 1650  Last data filed at 01/11/18 0354   Gross per 24 hour   Intake               50 ml   Output               80 ml   Net              -30 ml      Physical Exam   Constitutional: He is oriented to person, place, and time. No distress.   HENT:   Head: Normocephalic and atraumatic.   Eyes: EOM are normal. Scleral icterus is present.   Cardiovascular: Normal rate and regular rhythm.    Pulmonary/Chest: Effort normal. No respiratory distress.   Abdominal: Soft. Bowel sounds are normal. He exhibits no distension. There is no tenderness.   Musculoskeletal: Normal range of motion. He exhibits no edema.   Neurological: He is alert and oriented to person, place, and time.   Skin: Skin is warm and dry. He is not  diaphoretic.   jaundiced       Significant Labs:    Recent Labs  Lab 01/10/18  2137 01/11/18  0537 01/11/18  1330   WBC 4.78  4.78 5.58 6.86   HGB 8.0*  8.0* 7.8* 8.9*   HCT 24.0*  24.0* 23.5* 26.2*   PLT 85*  85* 88* 99*   MONO 7.0  7.0  CANCELED  CANCELED 11.6  0.7 11.7  0.8       Recent Labs  Lab 01/10/18  0518 01/11/18  0537 01/11/18  1330    143 136   K 3.9 2.6* 3.3*   * 118* 108   CO2 20* 17* 19*   BUN 41* 23 24*   CREATININE 1.4 1.2 1.2   CALCIUM 8.0* 7.0* 8.0*   PROT 4.7* 4.2* 4.8*   BILITOT 33.7* 27.1* 28.7*   ALKPHOS 371* 337* 348*   ALT 44 35 36   AST 75* 53* 49*

## 2018-01-11 NOTE — PLAN OF CARE
Problem: Physical Therapy Goal  Goal: Physical Therapy Goal  Goals to be met by: 18    Patient will increase functional independence with mobility by performin. Sit to stand transfer with Stand-by Assistance using rolling walker  2. Bed to chair transfer with Stand-by Assistance using Rolling Walker  3. Gait  x 100 feet with Stand-by Assistance using Rolling Walker.      Outcome: Ongoing (interventions implemented as appropriate)  Goals remain appropriate to improve functional mobility.    DC recs changed to SNF secondary to poor activity tolerance.    Levi Boone III, DPT, PT  2018

## 2018-01-11 NOTE — ASSESSMENT & PLAN NOTE
- Extensive workup at [facility]. Unsuccessful ERCP and Percutaneous biliary drain   - Based on CT abdomen, ERCP at [facility] showed severe duodenal stricture and unable to pass the scope through it   - CA 19-9 >60,000, suggestive of pancreatic or biliary malignancy  - [facility] consulted and performed EUS & ERCP (1/8/18), which was unsuccessful because were not able to pass the ERCP scope to 2nd portion of the duodenum despite duodenal dilation.  - 1/8 Biopsy was taken and showed atypical cells with mucinous background but no reported malignancy   - Surgical Oncology consulted and awaiting biopsy results before further recs  - CT pancreas protocol did not show a pancreatic mass but did show wall thickening and dilatation of the 1st part of duodenum and dilatation of the intrahepatic and CBD  - 1/10 [facility] to perform duodenal stenting + choledochoduodenostomy placement; repeat bx  - Continue cholestyramine for itching  - Bili downtrending  - D/c plan is home health with referrals to Oncology here to establish care

## 2018-01-11 NOTE — PROGRESS NOTES
[facility]  General Surgery  Progress Note    Subjective:     History of Present Illness:  Mr. Hal Sultana, 87 year old male with PMHx is significant for acquired pancytopenia, aortic aneurysm, stable CAD, esophageal dysmotility, gout, hypertension. Myelodysplastic syndrome. He is a transfer from [place], [place].    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  He returned a few days later with high fevers and was found to have a staff blood stream infection. He was started on antibiotics for this when he first began to notice the onset of jaundice, this was right after ismael. He returned to the hospital was found to have biliary ductal dilation, the ERCP scope was unable to traverse the duodenum, so a PTC was attempted and also unsuccessful in [place]. [place] was transfered [place] [facility] [place],   [place] [facility] [place] and FNA of a mass in the [place] duodenal space was done. Dilation of the duodenum was attempted but unsuccessful.     Post-Op Info:  Procedure(s) (LRB):  ESOPHAGOGASTRODUODENOSCOPY (EGD) (N/A)  ULTRASOUND-ENDOSCOPIC-UPPER (N/A)   1 Day Post-Op     Interval history: s/p placement of biliary stent and duodenal stent yesterday 1/10/18.   No acute events overnight. Labs not reported yet. Continues to be jaundice. NPO. Octreotide gtt.   Review of patient's allergies indicates:   Allergen Reactions    Avelox [moxifloxacin]     Darvocet a500 [propoxyphene n-acetaminophen]     Ibudone [hydrocodone-ibuprofen]     Levaquin [levofloxacin]        Past Medical History:   Diagnosis Date    AAA (abdominal aortic aneurysm)     BPH (benign prostatic hyperplasia)     Duodenal  stricture     Hypertension      Past Surgical History:   Procedure Laterality Date    ABDOMINAL AORTIC ANEURYSM REPAIR      CORONARY ARTERY BYPASS GRAFT      ERCP      ESOPHAGOGASTRODUODENOSCOPY  12/13/2017     Family History     Problem Relation (Age of Onset)    No Known Problems Mother, Father        Social History Main Topics    Smoking status: Current Every Day Smoker     Types: Cigarettes    Smokeless tobacco: Never Used    Alcohol use No    Drug use: No    Sexual activity: Not Currently     Review of Systems   Constitutional: Positive for activity change, appetite change, fatigue and fever.   HENT: Negative for congestion and drooling.    Eyes: Negative for pain, redness and itching.   Respiratory: Negative for cough and shortness of breath.    Cardiovascular: Negative for chest pain.   Gastrointestinal: Positive for abdominal distention and nausea. Negative for abdominal pain.   Endocrine: Negative for cold intolerance and heat intolerance.   Genitourinary: Negative for dysuria.   Musculoskeletal: Negative for back pain and gait problem.   Skin: Positive for color change. Negative for wound.   Allergic/Immunologic: Negative for environmental allergies and immunocompromised state.   Neurological: Negative for dizziness.   Hematological: Negative for adenopathy.   Psychiatric/Behavioral: Negative for agitation and confusion.     Objective:     Vital Signs (Most Recent):  Temp: 99.5 °F (37.5 °C) (01/11/18 0804)  Pulse: (!) 112 (01/11/18 0804)  Resp: 18 (01/11/18 0804)  BP: 131/70 (01/11/18 0804)  SpO2: 98 % (01/11/18 0804) Vital Signs (24h Range):  Temp:  [97.6 °F (36.4 °C)-100.2 °F (37.9 °C)] 99.5 °F (37.5 °C)  Pulse:  [] 112  Resp:  [15-20] 18  SpO2:  [89 %-100 %] 98 %  BP: (122-183)/(41-74) 131/70     Weight: 66.5 kg (146 lb 9.7 oz)  Body mass index is 21.65 kg/m².    Physical Exam   Constitutional: He is oriented to person, place, and time. He appears well-developed and well-nourished. No  distress.   jaundiced   HENT:   Head: Normocephalic and atraumatic.   Eyes: Conjunctivae are normal. Right eye exhibits no discharge. Left eye exhibits no discharge. Scleral icterus is present.   Neck: Normal range of motion. Neck supple. No JVD present. No tracheal deviation present.   Cardiovascular: Normal rate.    Pulmonary/Chest: Effort normal. No respiratory distress.   Abdominal: Soft. He exhibits no distension. There is no tenderness.   Well healed scar from CABG, open mary, and open AAA repair   Neurological: He is alert and oriented to person, place, and time.   Skin: Skin is warm and dry. No rash noted. He is not diaphoretic. No erythema.       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

## 2018-01-11 NOTE — PLAN OF CARE
Pt had PTC 1/10 to establish biliary drainage; CM was updated by MD that d/c plans were pending recs from specialty services.      01/11/18 1348   Discharge Reassessment   Assessment Type Discharge Planning Reassessment   Do you have any problems affording any of your prescribed medications? No   Discharge Plan A Home with family;Home Health   Discharge Plan B Skilled Nursing Facility   Patient choice form signed by patient/caregiver N/A   Can the patient answer the patient profile reliably? Yes, cognitively intact   How does the patient rate their overall health at the present time? Fair   Describe the patient's ability to walk at the present time. Major restrictions/daily assistance from another person   How often would a person be available to care for the patient? Often   Number of comorbid conditions (as recorded on the chart) Three   During the past month, has the patient often been bothered by feeling down, depressed or hopeless? Yes   During the past month, has the patient often been bothered by little interest or pleasure in doing things? Yes

## 2018-01-11 NOTE — PLAN OF CARE
Problem: Patient Care Overview  Goal: Plan of Care Review  Outcome: Ongoing (interventions implemented as appropriate)  Pt VSS And Aox4. Pt very lethargic after procedure and denies pain. Pt had increased frequency of urine and urine is dark and concentrated. Pt's family is concerned about discharge b/c of his declining condition. Pt had elevated temp during shift of 100.2. No PRN meds ordered for fever. Pt is NSR on tele. Safety precautions maintained and WCTM

## 2018-01-11 NOTE — ASSESSMENT & PLAN NOTE
Diagnosed 18 mos ago.  Patient pancytopenic. On Vidaza at home, but hemo onc consulted and recommend holding in setting of additional malignancy workup.   - Will continue to monitor CBCs and transfuse prn  -  marinol 2.5mg BID in future as appetite stimulant

## 2018-01-11 NOTE — NURSING TRANSFER
Nursing Transfer Note      1/10/2018     Transfer to:1044A from: [facility]    Transfer via stretcher    Transfer with cardiac monitoring    Transported by [facility]    Medicines sent: IV meds    Chart send with patient: Yes        Patient reassessed at: 1/10/18 1630    Upon arrival to floor: Rosanne PARK

## 2018-01-12 VITALS
OXYGEN SATURATION: 97 % | HEIGHT: 69 IN | RESPIRATION RATE: 18 BRPM | SYSTOLIC BLOOD PRESSURE: 131 MMHG | HEART RATE: 107 BPM | BODY MASS INDEX: 21.72 KG/M2 | TEMPERATURE: 99 F | WEIGHT: 146.63 LBS | DIASTOLIC BLOOD PRESSURE: 73 MMHG

## 2018-01-12 LAB
ALBUMIN SERPL BCP-MCNC: 1.8 G/DL
ALP SERPL-CCNC: 293 U/L
ALT SERPL W/O P-5'-P-CCNC: 30 U/L
ANION GAP SERPL CALC-SCNC: 10 MMOL/L
AST SERPL-CCNC: 37 U/L
BACTERIA BLD CULT: NORMAL
BACTERIA BLD CULT: NORMAL
BASOPHILS # BLD AUTO: 0.03 K/UL
BASOPHILS NFR BLD: 0.4 %
BILIRUB SERPL-MCNC: 26.4 MG/DL
BLD PROD TYP BPU: NORMAL
BLD PROD TYP BPU: NORMAL
BLOOD UNIT EXPIRATION DATE: NORMAL
BLOOD UNIT EXPIRATION DATE: NORMAL
BLOOD UNIT TYPE CODE: 6200
BLOOD UNIT TYPE CODE: 6200
BLOOD UNIT TYPE: NORMAL
BLOOD UNIT TYPE: NORMAL
BUN SERPL-MCNC: 21 MG/DL
CALCIUM SERPL-MCNC: 7.7 MG/DL
CHLORIDE SERPL-SCNC: 110 MMOL/L
CO2 SERPL-SCNC: 19 MMOL/L
CODING SYSTEM: NORMAL
CODING SYSTEM: NORMAL
CREAT SERPL-MCNC: 1.1 MG/DL
DIFFERENTIAL METHOD: ABNORMAL
DISPENSE STATUS: NORMAL
DISPENSE STATUS: NORMAL
EOSINOPHIL # BLD AUTO: 0.2 K/UL
EOSINOPHIL NFR BLD: 2.7 %
ERYTHROCYTE [DISTWIDTH] IN BLOOD BY AUTOMATED COUNT: 22.1 %
EST. GFR  (AFRICAN AMERICAN): >60 ML/MIN/1.73 M^2
EST. GFR  (NON AFRICAN AMERICAN): >60 ML/MIN/1.73 M^2
GLUCOSE SERPL-MCNC: 87 MG/DL
HCT VFR BLD AUTO: 25.5 %
HGB BLD-MCNC: 8.6 G/DL
IMM GRANULOCYTES # BLD AUTO: 0.21 K/UL
IMM GRANULOCYTES NFR BLD AUTO: 2.6 %
LYMPHOCYTES # BLD AUTO: 0.7 K/UL
LYMPHOCYTES NFR BLD: 8.8 %
MAGNESIUM SERPL-MCNC: 1.8 MG/DL
MCH RBC QN AUTO: 31.6 PG
MCHC RBC AUTO-ENTMCNC: 33.7 G/DL
MCV RBC AUTO: 94 FL
MONOCYTES # BLD AUTO: 0.9 K/UL
MONOCYTES NFR BLD: 10.9 %
NEUTROPHILS # BLD AUTO: 6.1 K/UL
NEUTROPHILS NFR BLD: 74.6 %
NRBC BLD-RTO: 0 /100 WBC
PHOSPHATE SERPL-MCNC: 2.4 MG/DL
PLATELET # BLD AUTO: 94 K/UL
PMV BLD AUTO: 10.3 FL
POTASSIUM SERPL-SCNC: 3.2 MMOL/L
PROT SERPL-MCNC: 4.7 G/DL
RBC # BLD AUTO: 2.72 M/UL
SODIUM SERPL-SCNC: 139 MMOL/L
TRANS ERYTHROCYTES VOL PATIENT: NORMAL ML
TRANS ERYTHROCYTES VOL PATIENT: NORMAL ML
WBC # BLD AUTO: 8.16 K/UL

## 2018-01-12 PROCEDURE — 85025 COMPLETE CBC W/AUTO DIFF WBC: CPT

## 2018-01-12 PROCEDURE — 25000003 PHARM REV CODE 250: Performed by: STUDENT IN AN ORGANIZED HEALTH CARE EDUCATION/TRAINING PROGRAM

## 2018-01-12 PROCEDURE — 63600175 PHARM REV CODE 636 W HCPCS: Performed by: HOSPITALIST

## 2018-01-12 PROCEDURE — 25000003 PHARM REV CODE 250: Performed by: HOSPITALIST

## 2018-01-12 PROCEDURE — 97803 MED NUTRITION INDIV SUBSEQ: CPT

## 2018-01-12 PROCEDURE — 63600175 PHARM REV CODE 636 W HCPCS: Mod: JG | Performed by: STUDENT IN AN ORGANIZED HEALTH CARE EDUCATION/TRAINING PROGRAM

## 2018-01-12 PROCEDURE — 80053 COMPREHEN METABOLIC PANEL: CPT

## 2018-01-12 PROCEDURE — 84100 ASSAY OF PHOSPHORUS: CPT

## 2018-01-12 PROCEDURE — 83735 ASSAY OF MAGNESIUM: CPT

## 2018-01-12 PROCEDURE — 99239 HOSP IP/OBS DSCHRG MGMT >30: CPT | Mod: ,,, | Performed by: INTERNAL MEDICINE

## 2018-01-12 RX ORDER — DRONABINOL 2.5 MG/1
2.5 CAPSULE ORAL
Qty: 60 CAPSULE | Refills: 2 | Status: SHIPPED | OUTPATIENT
Start: 2018-01-12

## 2018-01-12 RX ORDER — POTASSIUM CHLORIDE 20 MEQ/1
60 TABLET, EXTENDED RELEASE ORAL ONCE
Status: COMPLETED | OUTPATIENT
Start: 2018-01-12 | End: 2018-01-12

## 2018-01-12 RX ADMIN — DIPHENHYDRAMINE HYDROCHLORIDE 25 MG: 25 CAPSULE ORAL at 12:01

## 2018-01-12 RX ADMIN — PANTOPRAZOLE SODIUM 40 MG: 40 TABLET, DELAYED RELEASE ORAL at 05:01

## 2018-01-12 RX ADMIN — AMLODIPINE BESYLATE 5 MG: 5 TABLET ORAL at 09:01

## 2018-01-12 RX ADMIN — MONTELUKAST SODIUM 10 MG: 10 TABLET, FILM COATED ORAL at 09:01

## 2018-01-12 RX ADMIN — SOTALOL HYDROCHLORIDE 40 MG: 80 TABLET ORAL at 09:01

## 2018-01-12 RX ADMIN — POTASSIUM CHLORIDE 60 MEQ: 1500 TABLET, EXTENDED RELEASE ORAL at 10:01

## 2018-01-12 RX ADMIN — CHOLESTYRAMINE 4 G: 4 POWDER, FOR SUSPENSION ORAL at 05:01

## 2018-01-12 RX ADMIN — DRONABINOL 2.5 MG: 2.5 CAPSULE ORAL at 09:01

## 2018-01-12 RX ADMIN — DAPTOMYCIN 500 MG: 500 INJECTION, POWDER, LYOPHILIZED, FOR SOLUTION INTRAVENOUS at 01:01

## 2018-01-12 RX ADMIN — SODIUM CHLORIDE: 0.9 INJECTION, SOLUTION INTRAVENOUS at 07:01

## 2018-01-12 NOTE — NURSING
Patient's family requested to speak to Dr. Orr prior to discharge, team notified. Also awaiting Marinol prescription prior to discharge.

## 2018-01-12 NOTE — PLAN OF CARE
D/C noted with resumption orders for HH. Spoke with MARYURI Bolaños, advised her pt's HH orders are in and need to be faxed over for resumption. She stated she would complete.     1005- Spoke with Dr. Richardson, confirmed pt has completed his course of IV Dapto and will only need HH resumed no home IV ABX.

## 2018-01-12 NOTE — DISCHARGE SUMMARY
[facility] Hospital Medicine  Discharge Summary      Patient Name: Hal Sultana  MRN: [id number]  Admission Date: 1/7/2018  Hospital Length of Stay: 5 days  Discharge Date and Time: No discharge date for patient encounter.  Attending Physician: Cj Orr MD   Discharging Provider: Sahara Nazario MD  Primary Care Provider: Kaycee Ortega MD  [facility] Medicine Team: [facility] 4 Sahara Nazario MD    HPI:   This is Mr. Hal Sultana, 87 year old male with PMHx is significant for acquired pancytopenia, aortic aneurysm, stable CAD, esophageal dysmotility, gout, hypertension. Myelodysplastic syndrome. He is a transfer from [place], [place]. He was accepted for transfer by Dr. Sanchez from [facility] for possible intervention for obstructive jaundice.     He presented to ID clinic on January 1st, 2018 with worsening jaundice and T. Bili reached 19. He was discharged from the same hospital on 12/29/2017 after admission for pruritus and jaundice, underwent MRCP was inconclusive because of motion artifact. Plan to do ERCP; however not done because of improvement in his LFTs. During his December stay, where he initially presented with abdominal bloating and weight loss, he was found to have Staphylococcus sciuri bacteremia which was treated with Doxycycline and Daptomycin (with plan to end date on 2/2018 and intention to complete the therapy on with home infusion care). He underwent EGD and biopsy on 12/13/2017 with result showed no evidence of tumor or dysplasia. He also found to have severe duodenal stricture with plan for intervention (AES versus surgically).    For the January 2018 admission, he underwent different investigation for his worsening LFTs. CT abdomen on 1/3/2018 showed interval development of intrahepatic biliary ductal dilation with continued dilatation of CBD, no pancreas lesion. On 1/3/2018 he underwent ERCP which showed Tight duodenal stricture  in the post bulbar area with inability to get the scope to pass the stricture with concern this could be a malignant duodenal lesion. On 1/5/2018, he underwent percutaneous biliary drain despite multiple phases, and possibly due to intrahepatic duct dilation made the procedure difficult. On 1/5/2018 he underwent Based on labs for the last couple of days were T. Bili 19 and Direct Bili 14, Alk Phosph 469 ALT 52 AST 50. Renal function panel is within normal range. I reviewed medical charts from [facility] and summarize his charts.     Procedure(s) (LRB):  ESOPHAGOGASTRODUODENOSCOPY (EGD) (N/A)  ULTRASOUND-ENDOSCOPIC-UPPER (N/A)      Hospital Course:   Patient was accepted to hospital medicine as a transfer for AES evaluation for a duodenal stricture.  Upon presentation, patient found to be pancytopenic, although this is chronic due to his diagnosis of MDS.  His TBili was also found to be 27.7, which continues to rise. His vitals were stable, but blood cultures were drawn due to recent history of staph bacteremia.  These have been NGTD.  Patient was on daptomycin at home for the bactermia, which was continued throughout his stay.  Patient underwent EUS and ERCP on 1/8/18, but was unsuccessful as GI was unable to pass the ERCP scope to 2nd portion of the duodenum despite duodenal dilation. A biopsy was taken, and surgical oncology was consulted who recommended a CT with contrast pancreas protocol and a PTC by IR.  They are awaiting biopsy results to determine next step in plan. During the night after the ERCP,  patient was transfused 1 unit of pRBCs per AES recs and began to have hematemesis (300 cc total) close to midnight towards end of transfusion. His SBP was in the 90s/50s, HR 80s.  Patient started on protonix IV and repeat CBC showed a Hb 7.2.  Another unit of pRBCs was given with follow up Hb 7.7. Patient's BP improved, and his mentation was never altered.  Towards the end of the 2nd unit,  patient had 2 melenic BMs with no changes in BP.  He continued to deny abdominal pain.  On 1/10/18, patient had another melenic BM and was transfused 1 unit of pRBCs for Hb 7.0.  He also underwent duodenal stenting and choledochoduodenostomy placement by AES.  Because the pathology result from the initial biopsy did not report malignancy, AES will repeat biopsy. On 1/11/18, patient's bilirubin started improving, and he reported feeling a little better.  Marinol was started to help with appetite.  On 1/12/18, patient was in better spirits and felt like he had more energy.  He was stable for discharge home and will follow up with heme-onc for pathology results and further management.      Patient was seen and examined on day of discharge.  Vital signs reviewed and exam as follows:  Physical Exam   Constitutional: He is oriented to person, place, and time. No distress.   HENT:   Head: Normocephalic and atraumatic.   Eyes: EOM are normal. Scleral icterus is present.   Cardiovascular: Normal rate and regular rhythm.    Pulmonary/Chest: Effort normal. No respiratory distress.   Abdominal: Soft. Bowel sounds are normal. He exhibits no distension. There is no tenderness.   Musculoskeletal: Normal range of motion. He exhibits no edema.   Neurological: He is alert and oriented to person, place, and time.   Skin: Skin is warm and dry. He is not diaphoretic.   jaundiced     Consults:   Consults         Status Ordering Provider     Inpatient consult to [facility] (AES)  Once     Provider:  (Not yet assigned)    Completed KENNETH OROZCO     Inpatient consult to Hematology/Oncology  Once     Provider:  (Not yet assigned)    Completed KAY MCNULTY IV     Inpatient consult to Infectious Diseases  Once     Provider:  (Not yet assigned)    Completed KENNETH OROZCO     Inpatient consult to Registered Dietitian/Nutritionist  Once     Provider:  (Not yet assigned)    Completed MAGDA GRAVES      Inpatient consult to Surgical Oncology  Once     Provider:  (Not yet assigned)    Completed TWILA MCRAE          * Obstructive jaundice    - Extensive workup at [facility]. Unsuccessful ERCP and Percutaneous biliary drain   - Based on CT abdomen, ERCP at [facility] showed severe duodenal stricture and unable to pass the scope through it   - CA 19-9 >60,000, suggestive of pancreatic or biliary malignancy  - AES consulted and performed EUS & ERCP (1/8/18), which was unsuccessful because were not able to pass the ERCP scope to 2nd portion of the duodenum despite duodenal dilation.  - 1/8 Biopsy was taken and showed atypical cells with mucinous background but no reported malignancy   - Surgical Oncology consulted and awaiting biopsy results before further recs  - CT pancreas protocol did not show a pancreatic mass but did show wall thickening and dilatation of the 1st part of duodenum and dilatation of the intrahepatic and CBD  - 1/10 AES to perform duodenal stenting + choledochoduodenostomy placement with improvement in TBili ; repeat bx  - Cholestyramine has improved itching   - Bili continues downtrending and patient feels better  - D/c plan is home health with referrals to Oncology here to establish care  - given prescriptions for marinol and cholestyramine          Melena    - 1 episode of hematemesis and 2 melenic BMs in evening of 1/8  - EUS performed on 1/8 noted clots in gastric fundus without active bleeding  - Per AES, to be expected given findings of clot and to continue to monitor vitals  - Started on IVF, octreotide, protonix IV, and rocephin, which were discontinued prior to discharge  - No further episodes of hematemesis but did have a melenic BM this morning 1/10  - Hb stable upon discharge          Hematemesis    See melena            Bacteremia due to Staphylococcus    -Per ID dapto stop date 1/12, may have been contaminant as was only 1/4 Cx         Bacterial infection due to  Staphylococcus    - On Decemebr 2017 admission found to have Staphylococcus sciuri bactremia   - Was treated with Doxycycline and Daptomycin (with plan to end date on 2/2018 and intention to complete the therapy on with home infusion care).  - ID consulted and appreciate recs  - Home infusion center called and pt was on daptomycin 430mg IV with end date scheduled for 2/7/18  - Will continue Daptomycin 500mg IV qday with weekly CPK labs  - Per ID dapto stop date 1/12, may have been contaminant as was only 1/4 Cx   - Will need repeat blood cultures as outpatient        Duodenal stricture    See obstructive jaundice        Benign prostatic hyperplasia with lower urinary tract symptoms    - Continue flomax        CAD (coronary artery disease)    - Remote history of CAD and CABG  - Currently stable, no chest pain, on Aspirin and Statin at home  - Held aspirin in setting of GIB        Esophageal dysmotilities    - Underwent EGD, and no anatomic abnormalities in EGD   - Stable on Pantoprazole for symptomatic GERD         MDS (myelodysplastic syndrome)    Diagnosed 18 mos ago.  Patient pancytopenic. On Vidaza at home, but hemo onc consulted and recommend holding in setting of additional malignancy workup.   -  marinol 2.5mg BID as appetite stimulant  - Continue mgmt per patient's oncologist back at home          Essential hypertension    - On Amlodipine 5 mg and Sotalol 40 mg PO BID at home  Continue          Final Active Diagnoses:    Diagnosis Date Noted POA    PRINCIPAL PROBLEM:  Obstructive jaundice [K83.8] 01/05/2018 Yes    Hematemesis [K92.0] 01/09/2018 Yes    Melena [K92.1] 01/09/2018 Yes    Duodenal stricture [K31.5] 01/07/2018 Yes    Bacterial infection due to Staphylococcus [B95.8] 01/07/2018 Yes    Bacteremia due to Staphylococcus [R78.81] 01/07/2018 Yes    CAD (coronary artery disease) [I25.10] 01/06/2018 Yes    Essential hypertension [I10] 01/06/2018 Yes    Esophageal dysmotilities [K22.4] 01/06/2018  "Yes    Benign prostatic hyperplasia with lower urinary tract symptoms [N40.1] 01/06/2018 Yes     Chronic    MDS (myelodysplastic syndrome) [D46.9] 01/06/2018 Yes      Problems Resolved During this Admission:    Diagnosis Date Noted Date Resolved POA    BRAXTON (acute kidney injury) [N17.9] 01/09/2018 01/11/2018 Yes       Discharged Condition: stable    Disposition: Home or Self Care    Follow Up:  Follow-up Information     [facility]. Schedule an appointment as soon as possible for a visit in 1 week.    Specialty:  Hematology and Oncology  Contact information:  Nena Lema  [facility] [address] [phone]  Additional information:  [facility] - 3rd Floor           Dr. Ortega.    Why:  This appt has been scheduled for you.   Contact information:  Please follow up with Dr. Ortega on   Tuesday 1/16/2018 @ 0900  [phone]               Patient Instructions:     WALKER FOR HOME USE   Order Specific Question Answer Comments   Type of Walker: Adult (5'4"-6'6")    With wheels? Yes    Height: 5' 9" (1.753 m)    Weight: 66.5 kg (146 lb 9.7 oz)    Length of need (1-99 months): 99    Does patient have medical equipment at home? none    Does patient have medical equipment at home? raised toilet    Please check all that apply: Patient's condition impairs ambulation.      TRANSFER TUB BENCH FOR HOME USE   Order Specific Question Answer Comments   Type of Transfer Tub Bench: Unpadded    Height: 5' 9" (1.753 m)    Weight: 66.5 kg (146 lb 9.7 oz)    Does patient have medical equipment at home? none    Does patient have medical equipment at home? raised toilet    Length of need (1-99 months): 99      COMMODE FOR HOME USE   Order Specific Question Answer Comments   Type: Standard    Height: 5' 9" (1.753 m)    Weight: 66.5 kg (146 lb 9.7 oz)    Does patient have medical equipment at home? none    Does patient have medical equipment at home? raised toilet    Length of need (1-99 " "months): 99      WHEELCHAIR FOR HOME USE   Order Specific Question Answer Comments   Hours in W/C per day: 6    Type of Wheelchair: Standard    Size(Width): 18"(STD adult)    Leg Support: STD footrests    Lap Belt: Buckle    Cushion: Basic    Height: 5' 9" (1.753 m)    Weight: 66.5 kg (146 lb 9.7 oz)    Does patient have medical equipment at home? raised toilet    Length of need (1-99 months): 99    Please check all that apply: Caregiver is capable and willing to operate wheelchair safely.      Ambulatory Referral to Hematology / Oncology   Referral Priority: Routine Referral Type: Consultation   Referral Reason: Specialty Services Required    Referred to Provider: SUSIE KINCAID Specialty: Hematology and Oncology   Number of Visits Requested: 1      Diet Adult Regular     Notify your health care provider if you experience any of the following:  temperature >100.4     Notify your health care provider if you experience any of the following:  persistent nausea and vomiting or diarrhea     Notify your health care provider if you experience any of the following:  severe uncontrolled pain     Notify your health care provider if you experience any of the following:  difficulty breathing or increased cough     Notify your health care provider if you experience any of the following:  persistent dizziness, light-headedness, or visual disturbances     Notify your health care provider if you experience any of the following:  increased confusion or weakness         Significant Diagnostic Studies: Labs:   CMP   Recent Labs  Lab 01/11/18  0537 01/11/18  1330 01/12/18  0554    136 139   K 2.6* 3.3* 3.2*   * 108 110   CO2 17* 19* 19*   GLU 63* 101 87   BUN 23 24* 21   CREATININE 1.2 1.2 1.1   CALCIUM 7.0* 8.0* 7.7*   PROT 4.2* 4.8* 4.7*   ALBUMIN 1.6* 1.9* 1.8*   BILITOT 27.1* 28.7* 26.4*   ALKPHOS 337* 348* 293*   AST 53* 49* 37   ALT 35 36 30   ANIONGAP 8 9 10   ESTGFRAFRICA >60.0 >60.0 >60.0   EGFRNONAA " 54.0* 54.0* >60.0   , CBC   Recent Labs  Lab 01/11/18  1330 01/11/18  2203 01/12/18  0554   WBC 6.86 6.21 8.16   HGB 8.9* 8.1* 8.6*   HCT 26.2* 23.9* 25.5*   PLT 99* 83* 94*    and INR   Lab Results   Component Value Date    INR 1.4 (H) 01/10/2018    INR 1.2 01/08/2018    INR 1.2 01/07/2018       Pending Diagnostic Studies:     Procedure Component Value Units Date/Time    FL ERCP Biliary And Pancreatic [[id number]] Resulted:  01/10/18 1511    Order Status:  Sent Lab Status:  In process Updated:  01/10/18 1705    FL ERCP Biliary And Pancreatic [[id number]] Resulted:  01/08/18 0951    Order Status:  Sent Lab Status:  In process Updated:  01/08/18 1123    IR Biliary Transhepatic Cholangiogram [[id number]]     Order Status:  Sent Lab Status:  No result     US Endoscopic Ultrasound [[id number]] Resulted:  01/10/18 1512    Order Status:  Sent Lab Status:  In process Updated:  01/10/18 1512    US Endoscopic Ultrasound [[id number]] Resulted:  01/08/18 0951    Order Status:  Sent Lab Status:  In process Updated:  01/08/18 1124         Medications:  Reconciled Home Medications:   Current Discharge Medication List      START taking these medications    Details   cholestyramine (QUESTRAN) 4 gram packet Take 1 packet (4 g total) by mouth 3 (three) times daily.  Qty: 270 packet, Refills: 0      diphenhydrAMINE (BENADRYL) 25 mg capsule Take 1 each (25 mg total) by mouth every 6 (six) hours as needed for Itching.  Refills: 0      dronabinol (MARINOL) 2.5 MG capsule Take 1 capsule (2.5 mg total) by mouth 2 (two) times daily before meals.  Qty: 60 capsule, Refills: 2         CONTINUE these medications which have CHANGED    Details   aspirin 81 MG Chew Take 1 tablet (81 mg total) by mouth once daily. HOLD UNTIL INSTRUCTED BY PCP  Refills: 0         CONTINUE these medications which have NOT CHANGED    Details   albuterol 2 mg/5 mL syrup Take 2.5 mg by mouth every 4 (four) hours as needed.      amLODIPine (NORVASC) 5 MG tablet Take 5 mg by  mouth every evening.      finasteride (PROSCAR) 5 mg tablet Take 5 mg by mouth every other day.      fluticasone (FLONASE) 50 mcg/actuation nasal spray 2 sprays by Each Nare route daily as needed for Rhinitis.      montelukast (SINGULAIR) 10 mg tablet Take 10 mg by mouth every evening.      nitroGLYCERIN 0.4 MG/DOSE TL SPRY (NITROLINGUAL) 400 mcg/spray spray Place 1 spray under the tongue every 5 (five) minutes as needed for Chest pain.      omeprazole (PRILOSEC) 40 MG capsule Take 40 mg by mouth every evening.      polyethylene glycol (GLYCOLAX) 17 gram PwPk Take 17 g by mouth daily as needed (constipation).       sotalol (BETAPACE) 40 MG tablet Take 40 mg by mouth 2 (two) times daily.      tamsulosin (FLOMAX) 0.4 mg Cp24 Take 0.4 mg by mouth every evening.      tiotropium (SPIRIVA) 18 mcg inhalation capsule Inhale 18 mcg into the lungs once daily. Controller         STOP taking these medications       loratadine-pseudoephedrine 5-120 mg (CLARITIN-D 12-HOUR) 5-120 mg per tablet Comments:   Reason for Stopping:         pravastatin (PRAVACHOL) 20 MG tablet Comments:   Reason for Stopping:         tolterodine (DETROL LA) 4 MG 24 hr capsule Comments:   Reason for Stopping:               Indwelling Lines/Drains at time of discharge:   Lines/Drains/Airways          No matching active lines, drains, or airways          Time spent on the discharge of patient: >30 minutes  Patient was seen and examined on the date of discharge and determined to be suitable for discharge.         Sahara Nazario MD  Department of Hospital Medicine  [facility]

## 2018-01-12 NOTE — PLAN OF CARE
Problem: Patient Care Overview  Goal: Plan of Care Review  Outcome: Ongoing (interventions implemented as appropriate)  POC reviewed with patient. VSS. SR-ST on telemetry (MD aware). Ambulating to bedside commode with assistance. PICC clean, dry intact. Patient remained free from falls and trauma. Call light in reach. WCTM.

## 2018-01-12 NOTE — ASSESSMENT & PLAN NOTE
- Extensive workup at [facility]. Unsuccessful ERCP and Percutaneous biliary drain   - Based on CT abdomen, ERCP at [facility] showed severe duodenal stricture and unable to pass the scope through it   - CA 19-9 >60,000, suggestive of pancreatic or biliary malignancy  - AES consulted and performed EUS & ERCP (1/8/18), which was unsuccessful because were not able to pass the ERCP scope to 2nd portion of the duodenum despite duodenal dilation.  - 1/8 Biopsy was taken and showed atypical cells with mucinous background but no reported malignancy   - Surgical Oncology consulted and awaiting biopsy results before further recs  - CT pancreas protocol did not show a pancreatic mass but did show wall thickening and dilatation of the 1st part of duodenum and dilatation of the intrahepatic and CBD  - 1/10 AES to perform duodenal stenting + choledochoduodenostomy placement with improvement in TBili ; repeat bx  - Cholestyramine has improved itching   - Bili continues downtrending and patient feels better  - D/c plan is home health with referrals to Oncology here to establish care  - given prescriptions for marinol and cholestyramine

## 2018-01-12 NOTE — PROGRESS NOTES
"  [facility]  Adult Nutrition  Consult Note    SUMMARY     Recommendations    1. Continue dental soft, low fiber diet per SLP.  2. Encourage pt to consume 50% PO.   3. RD to monitor and follow-up.    Goals: PO intake >50%  Nutrition Goal Status: new  Communication of RD Recs: reviewed with RN    Reason for Assessment    Reason for Assessment: RD follow-up  Diagnosis: other (see comments) (Obstructive jaundice)  Relevent Medical History: HTN, Duodenal stricture    Interdisciplinary Rounds: did not attend    General Information Comments: ST recommends dental soft diet low fiber, Boost Plus ONS. Pt reports PO 25% of meals.     Nutrition Discharge Planning: PO >75%    Nutrition Prescription Ordered    Current Diet Order: Dental soft diet low fiber  Oral Nutrition Supplement: Boost Plus     Nutrition Risk Screen     Nutrition Risk Screen: dysphagia or difficulty swallowing    Nutrition/Diet History    Patient Reported Diet/Restrictions/Preferences: general    Supplemental Drinks or Food Habits: Ensure  Factors Affecting Nutritional Intake: NPO, early satiety, decreased appetite    Labs/Tests/Procedures/Meds    Pertinent Labs Reviewed: reviewed, pertinent  Pertinent Labs Comments: K 3.3, BUN 24, TBili 28.7   Pertinent Medications Reviewed: reviewed, pertinent   Medications: Mg sulfate    Physical Findings    Overall Physical Appearance: underweight  Skin: intact    Anthropometrics  Height: 5' 9" (175.3 cm)  Weight Method: Bed Scale  Weight: 66.5 kg (146 lb 9.7 oz)  Ideal Body Weight (IBW), Male: 160 lb    % Ideal Body Weight, Male (lb): 91.63 lb    BMI (Calculated): 21.7  BMI Grade: 18.5-24.9 - normal     Weight Loss: unintentional  Usual Body Weight (UBW), k.3 kg     % Usual Body Weight: 92.17  % Weight Change From Usual Weight: -8.02 %    Estimated/Assessed Needs    Weight Used For Calorie Calculations: 66.5 kg (146 lb 9.7 oz)     Energy Calorie Requirements (kcal): 1662 kcal/d  Energy Need " Method: [place]-St Jeor (x1.25 PAL)    RMR ([place]-St. Jeor Equation): 1330.38     Weight Used For Protein Calculations: 66.5 kg (146 lb 9.7 oz)  Protein Requirements: 66-80 g/d (1.0-1.2 g/kg)    Fluid Need Method: RDA Method  RDA Method (mL): 1662    Assessment and Plan    Inadequate oral intake r/t poor appetite as evidenced by poor PO intake.   Status: Continues    Monitor and Evaluation    Food and Nutrient Intake: energy intake, food and beverage intake  Food and Nutrient Adminstration: diet order     Physical Activity and Function: nutrition-related ADLs and IADLs  Anthropometric Measurements: height/length, weight, weight change, body mass index  Biochemical Data, Medical Tests and Procedures: gastrointestinal profile, electrolyte and renal panel, glucose/endocrine profile, inflammatory profile, lipid profile  Nutrition-Focused Physical Findings: overall appearance    Nutrition Risk    Level of Risk: other (see comments) (1x/week)    Nutrition Follow-Up    RD Follow-up?: Yes     I certify that I directed the dietetic intern in service delivery and guided them using my skilled judgment. As the cosigning dietitian, I have reviewed the dietetic interns documentation and am responsible for the treatment, assessment, and plan.  Lorenzo Cleveland

## 2018-01-12 NOTE — ASSESSMENT & PLAN NOTE
- 1 episode of hematemesis and 2 melenic BMs in evening of 1/8  - EUS performed on 1/8 noted clots in gastric fundus without active bleeding  - Per AES, to be expected given findings of clot and to continue to monitor vitals  - Started on IVF, octreotide, protonix IV, and rocephin, which were discontinued prior to discharge  - No further episodes of hematemesis but did have a melenic BM this morning 1/10  - Hb stable upon discharge

## 2018-01-12 NOTE — ASSESSMENT & PLAN NOTE
Diagnosed 18 mos ago.  Patient pancytopenic. On Vidaza at home, but hemo onc consulted and recommend holding in setting of additional malignancy workup.   -  marinol 2.5mg BID as appetite stimulant  - Continue mgmt per patient's oncologist back at home

## 2018-01-12 NOTE — PLAN OF CARE
01/12/18 1119   Final Note   Assessment Type Final Discharge Note   Discharge Disposition Home-[facility]   Hospital Follow Up  Appt(s) scheduled? Yes   Right Care Referral Info   Post Acute Recommendation Home-care   Facility Name ([facility])

## 2018-01-12 NOTE — NURSING
Pt AAOx4, family at bedside. PICC line discontinued, catheter intact, no complications during removal. Discharge instructions and prescription explained and given to patient. Family waiting to see MD then will escort patient out via wheel. Denies other questions or needs at this time.

## 2018-01-15 ENCOUNTER — PATIENT OUTREACH (OUTPATIENT)
Dept: ADMINISTRATIVE | Facility: CLINIC | Age: 83
End: 2018-01-15

## 2018-01-15 RX ORDER — DOCUSATE SODIUM 100 MG/1
100 CAPSULE, LIQUID FILLED ORAL 3 TIMES DAILY PRN
COMMUNITY

## 2018-01-15 NOTE — PATIENT INSTRUCTIONS
Understanding Cirrhosis    Cirrhosis is a chronic (lifelong) liver problem. It results from damaged and scarred liver tissue. Cirrhosis cant be cured. But it can be treated. Your healthcare provider can tell you more.  The liver  The liver is a large organ in the upper right part of the belly. A healthy liver metabolizes proteins, carbohydrates, and fats. It makes a digestive fluid called bile and removes toxins from the blood. The liver is also involved in the blood-clotting process.  When you have cirrhosis  When you have cirrhosis, your liver becomes damaged and scarred. The liver doesnt function as it should. In some cases, cirrhosis can lead to liver failure. If it does, your healthcare provider will tell you whether you may need a liver transplant. You can slow down the progression of cirrhosis if you stop all alcohol use. Also, if you have metabolic problems like being overweight, diabetes, high blood pressure, or high cholesterol and triglycerides, you can also slow down the progression of cirrhosis by trying to improve those diseases.   Causes of cirrhosis  Cirrhosis causes include the following:  · Alcohol use  · Viral liver infections, such as hepatitis  · Chronic bile duct blockage  · Certain inherited diseases that can result in too much copper or iron being stored in the liver  · Certain medicines  · Nonalcoholic fatty liver disease  · Autoimmune disease  Common signs and symptoms  Typical cirrhosis signs and symptoms include the following:  · Fatigue, weakness, and lack of appetite  · Vomiting with or without blood  · Weight loss or weight gain  · Yellowish skin and eyes (jaundice)  · Itching  · Swollen belly and legs  · Intestinal bleeding  · Easy bruising of the skin  · Dilated veins in the esophagus and stomach  · Poor mental function  Date Last Reviewed: 6/1/2016 © 2000-2017 [employer]. [address] [place] [address], [place] [address]. All rights reserved. This information is not  intended as a substitute for professional medical care. Always follow your healthcare professional's instructions.

## 2018-01-15 NOTE — PT/OT/SLP DISCHARGE
Occupational Therapy Discharge Summary    Hal Sultana  MRN: [id number]   Principal Problem: Obstructive jaundice      Patient Discharged from acute Occupational Therapy on 01/12/18.  Please refer to prior OT note dated 01/09/18 for functional status.    Assessment:      Patient was discharged unexpectedly.  Information required to complete an accurate discharge summary is unknown.  Refer to therapy initial evaluation and last progress note for initial and most recent functional status and goal achievement.  Recommendations made may be found in medical record.    Objective:     GOALS:    Occupational Therapy Goals        Problem: Occupational Therapy Goal    Goal Priority Disciplines Outcome Interventions   Occupational Therapy Goal     OT, PT/OT Ongoing (interventions implemented as appropriate)    Description:  Goals to be met by: 01/15/18     Patient will increase functional independence with ADLs by performing:    UE Dressing with Modified [employer].  LE Dressing with Modified [employer].  Grooming while standing with Stand-by Assistance.  Toileting from bedside commode with Stand-by Assistance for hygiene and clothing management.   Toilet transfer to bedside commode with Stand-by Assistance.  Upper extremity exercise program x15 reps per handout, with supervision.                      Reasons for Discontinuation of Therapy Services  Transfer to alternate level of care.      Plan:     Patient Discharged to: Home with Home Health Service    Juana Jaquez, OT  1/15/2018

## 2018-01-16 NOTE — PT/OT/SLP DISCHARGE
Physical Therapy Discharge Summary    Name: Hal Sultana  MRN: [id number]   Principal Problem: Obstructive jaundice     Patient Discharged from acute Physical Therapy on 18.  Please refer to prior PT noted date on 18 for functional status.     Assessment:     Patient appropriate for care in another setting.    Objective:     GOALS:    Physical Therapy Goals        Problem: Physical Therapy Goal    Goal Priority Disciplines Outcome Goal Variances Interventions   Physical Therapy Goal     PT/OT, PT Ongoing (interventions implemented as appropriate)     Description:  Goals to be met by: 18    Patient will increase functional independence with mobility by performin. Sit to stand transfer with Stand-by Assistance using rolling walker  2. Bed to chair transfer with Stand-by Assistance using Rolling Walker  3. Gait  x 100 feet with Stand-by Assistance using Rolling Walker.                        Reasons for Discontinuation of Therapy Services  Transfer to alternate level of care.      Plan:     Patient Discharged to: PT recommends SNF; however, patient DC'd home with  services.    Levi Boone III, PT  2018

## 2018-01-17 ENCOUNTER — TELEPHONE (OUTPATIENT)
Dept: HEMATOLOGY/ONCOLOGY | Facility: CLINIC | Age: 83
End: 2018-01-17

## 2018-01-17 ENCOUNTER — TELEPHONE (OUTPATIENT)
Dept: GASTROENTEROLOGY | Facility: CLINIC | Age: 83
End: 2018-01-17

## 2018-01-17 NOTE — TELEPHONE ENCOUNTER
----- Message from Lance Sanchez MD sent at 1/17/2018  8:36 AM CST -----  Marcie- I spoke to pt's daughter and let her know of the results. They are interested in meeting with an oncologist at [facility]. Please arrange for an appt for them here. Also, please make sure Dr. Brandon Harkins gets copies of all the endo procedure reports, and path (2 separate reports).

## 2018-01-22 ENCOUNTER — INITIAL CONSULT (OUTPATIENT)
Dept: HEMATOLOGY/ONCOLOGY | Facility: CLINIC | Age: 83
End: 2018-01-22
Payer: MEDICARE

## 2018-01-22 VITALS
RESPIRATION RATE: 20 BRPM | TEMPERATURE: 98 F | SYSTOLIC BLOOD PRESSURE: 117 MMHG | DIASTOLIC BLOOD PRESSURE: 59 MMHG | HEART RATE: 75 BPM | BODY MASS INDEX: 21.25 KG/M2 | HEIGHT: 69 IN | WEIGHT: 143.5 LBS | OXYGEN SATURATION: 99 %

## 2018-01-22 DIAGNOSIS — C80.1 OBSTRUCTIVE JAUNDICE DUE TO CANCER: ICD-10-CM

## 2018-01-22 DIAGNOSIS — D46.9 MDS (MYELODYSPLASTIC SYNDROME): ICD-10-CM

## 2018-01-22 DIAGNOSIS — D75.89 BICYTOPENIA: ICD-10-CM

## 2018-01-22 DIAGNOSIS — K83.1 OBSTRUCTIVE JAUNDICE DUE TO CANCER: ICD-10-CM

## 2018-01-22 DIAGNOSIS — C17.0 DUODENAL ADENOCARCINOMA: Primary | ICD-10-CM

## 2018-01-22 PROCEDURE — 99214 OFFICE O/P EST MOD 30 MIN: CPT | Mod: PBBFAC | Performed by: INTERNAL MEDICINE

## 2018-01-22 PROCEDURE — 99999 PR PBB SHADOW E&M-EST. PATIENT-LVL IV: CPT | Mod: PBBFAC,GC,, | Performed by: INTERNAL MEDICINE

## 2018-01-22 RX ORDER — LORATADINE 10 MG/1
10 TABLET ORAL DAILY
COMMUNITY

## 2018-01-22 NOTE — PROGRESS NOTES
HEMATOLOGY/ONCOLOGY CONSULT    Requesting physician: Dr. Sanchez    Reason for consult: Newly Diagnosed GI Adenocarcinoma     HPI:   Mr. Sultana is a 88 y/o M with PMHx of Pancytopenia/MDS, Aortic Aneurysm, CAD s/p CABG, esophageal dysmotility, gout who is referred by GI for newly diagnosed adenocarcinoma. 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  He returned a few days later with high fevers and was found to have a Staph Sciuri Bacteremia. He was treated with Doxycycline and Daptomycin (with plan to end date on 2/2018 and intention to complete the therapy on with home infusion care). He was started on antibiotics for this when he first began to notice the onset of jaundice, this was right after ismael. He returned to the hospital was found to have biliary ductal dilation, the ERCP scope was unable to traverse the duodenum, so a PTC was attempted and also unsuccessful in [place]. He was transfered to [facility] for further evaluation. Upon presentation, patient found to be pancytopenic, although this is chronic due to his diagnosis of MDS.  His TBili was also found to be 27.7, which continues to rise. His vitals were stable, but blood cultures were drawn due to recent history of staph bacteremia.  These have been NGTD. At [facility] an EUS was preformed  On 1/8/18 which showed no gross lesions in esophagus, hematin (altered blood/coffee-ground-like material) in the gastric fundus. Acquired duodenal stenosis. There was dilation in the middle third of the main bile duct, in the upper third of the main bile duct and in the intrahepatic bile duct(s) which measured up to 16 mm. There was evidence for intraductal filling defects (suspect clots).  Dilation of the duodenum was attempted but unsuccessful.  A mass was found in the apex of the duodenal bulb (in periduodenal area). Fine needle aspiration performed. ERCP was performed on same day an acquired extrinsic severe stenosis was found in the first portion of the duodenum and was non-traversed. A TTS dilator was passed through the scope. Dilation with a 12-13.5-15 mm balloon dilator was performed in the duodenum (to max size 13.5mm). Despite dilation, ERCP scope would not pass. Pathology from FNA was consistent with Clusters of atypical glandular cells in a mucinous background, not diagnostic of malignancy. A tissue biopsy was indicated for further evaluation. Surgical oncology was consulted who recommended a CT with contrast pancreas protocol and a PTC by IR.  A repeat EUS was done on 1/10/18, No gross lesions were noted in the entire esophagus. No gross lesions were noted in the entire examined stomach. Previously seen blood clots in the stomach were no longer seen. No underlying high risk bleeding lesions were seen. A hypoechoic mass was identified endosonographically in the apex of the duodenal bulb. The mass measured 25 mm by 15 mm in maximal cross-sectional diameter. The endosonographic borders were poorly-defined. There was sonographic evidence suggesting invasion into the pancreas (invasion was directly visualized endosonographically). Fine needle aspiration for cytology was performed. Preliminary cytology is suspicious for adenocarcinoma which was consistent with final pathology eventually. An ERCP was performed on same day as well, Biliary drainage procedure performed (EUS-guided choledochoduodenostomy). Lumen-apposing metal stent and plastic stent (to maintain axis of LAMS and for drainage) placed.  Acquired duodenal stenosis s/p Duodenal stent placed.  Patient was on daptomycin at home for the bactermia, which was continued throughout his hospital stay. On 1/11/18, patient's bilirubin started  improving, and he reported feeling a little better.  Marinol was started to help with appetite.  On 1/12/18, patient was in better spirits and felt like he had more energy.  He was stable for discharge home and will follow up with heme-onc for further management. A CT of Abdomen/Pelvis with Pancreas Protocol on 1/9/18 showed no focal hepatic lesions. Status post cholecystectomy with surgical clips in the gallbladder fossa. Dilatation of the intrahepatic bile ducts in addition to the common bile duct. No mass or peripancreatic fat stranding. There is dilatation and wall thickening of the first part of the duodenum with associated air-fluid level, duodenal stenosis.  Distal loops of bowels are collapsed.    Today, he is accompained by his daughter and wife. Needs wheelchair when walking for long distance. Appetite gradually improving since discharge. Still has jaundice, but reports improvement since December. Denies any fever/chills. Denies any significant itching. No abdominal pain.     Past Medical History:   Diagnosis Date    AAA (abdominal aortic aneurysm)     BPH (benign prostatic hyperplasia)     Duodenal stricture     Hypertension      Past Surgical History:   Procedure Laterality Date    ABDOMINAL AORTIC ANEURYSM REPAIR      CORONARY ARTERY BYPASS GRAFT      ERCP      ESOPHAGOGASTRODUODENOSCOPY  12/13/2017       Allergies:   Avelox [moxifloxacin]; Darvocet a500 [propoxyphene n-acetaminophen]; Ibudone [hydrocodone-ibuprofen]; and Levaquin [levofloxacin]      Medications:     Current Outpatient Prescriptions   Medication Sig Dispense Refill    albuterol 2 mg/5 mL syrup Take 2.5 mg by mouth every 4 (four) hours as needed.      amLODIPine (NORVASC) 5 MG tablet Take 5 mg by mouth every evening.      aspirin 81 MG Chew Take 1 tablet (81 mg total) by mouth once daily. HOLD UNTIL INSTRUCTED BY PCP  0    cholestyramine (QUESTRAN) 4 gram packet Take 1 packet (4 g total) by mouth 3 (three) times daily. 270  packet 0    diphenhydrAMINE (BENADRYL) 25 mg capsule Take 1 each (25 mg total) by mouth every 6 (six) hours as needed for Itching.  0    docusate sodium (COLACE) 100 MG capsule Take 100 mg by mouth 3 (three) times daily as needed for Constipation.      dronabinol (MARINOL) 2.5 MG capsule Take 1 capsule (2.5 mg total) by mouth 2 (two) times daily before meals. 60 capsule 2    finasteride (PROSCAR) 5 mg tablet Take 5 mg by mouth every other day.      fluticasone (FLONASE) 50 mcg/actuation nasal spray 2 sprays by Each Nare route daily as needed for Rhinitis.      montelukast (SINGULAIR) 10 mg tablet Take 10 mg by mouth every evening.      nitroGLYCERIN 0.4 MG/DOSE TL SPRY (NITROLINGUAL) 400 mcg/spray spray Place 1 spray under the tongue every 5 (five) minutes as needed for Chest pain.      omeprazole (PRILOSEC) 40 MG capsule Take 40 mg by mouth every evening.      polyethylene glycol (GLYCOLAX) 17 gram PwPk Take 17 g by mouth daily as needed (constipation).       sotalol (BETAPACE) 40 MG tablet Take 40 mg by mouth 2 (two) times daily.      tamsulosin (FLOMAX) 0.4 mg Cp24 Take 0.4 mg by mouth every evening.      tiotropium (SPIRIVA) 18 mcg inhalation capsule Inhale 18 mcg into the lungs once daily. Controller       No current facility-administered medications for this visit.      Social History     Social History    Marital status:      Spouse name: N/A    Number of children: N/A    Years of education: N/A     Occupational History    Not on file.     Social History Main Topics    Smoking status: Current Every Day Smoker     Types: Cigarettes    Smokeless tobacco: Never Used    Alcohol use No    Drug use: No    Sexual activity: Not Currently     Other Topics Concern    Not on file     Social History Narrative    No narrative on file     Family History   Problem Relation Age of Onset    No Known Problems Mother     No Known Problems Father        Review Of Systems:   Gen: fatigue and weight  loss  HEENT: negaitve  Pulm: cough  CV: negative  GI: (loss of appetite   : dark urine  Skin: puritis  MS/Neuro: weakness  Psych: negative    Physical Exam:   Performance Status: 3  Constitutional: He is oriented to person, place, and time. He appears well-developed and well-nourished. No distress.   jaundiced   HENT:   Head: Normocephalic and atraumatic.   Eyes: Conjunctivae are normal. Right eye exhibits no discharge. Left eye exhibits no discharge. Scleral icterus is present.   Neck: Normal range of motion. Neck supple. No JVD present. No tracheal deviation present.   Cardiovascular: Normal rate.    Pulmonary/Chest: Effort normal. No respiratory distress.   Abdominal: Soft. He exhibits no distension. There is no tenderness.   Well healed scar from CABG, open mary, and open AAA repair   Neurological: He is alert and oriented to person, place, and time.   Skin: Skin is warm and dry. No rash noted. He is not diaphoretic. No erythema.      Diagnostic Tests:     CT of A/P with Pancreas Protocol 1/9/18:     - Wall thickening and dilatation of the 1st part of duodenum, consistent with duodenal stenosis at the junction of 1st and 2nd part of duodenum.  Dilatation of the intrahepatic and common bile duct. LEFT kidney atrophy. RIGHT adrenal is not visualized with surgical clips in the adrenal region, presumptively from an old adrenalectomy. Prostatomegaly protruding into the base of the urinary bladder. Ill-defined opacity within the posterior margin of the RIGHT lung base.  Mass cannot be ruled out.      FINAL PATHOLOGIC DIAGNOSIS 1/10/18:    Periduodenal mass, EUS guided fine-needle aspiration:  - Positive for malignant cells, adenocarcinoma.    COMMENT: The smears show clusters of malignant epithelial cells consistent with adenocarcinoma. The cell block  shows crushed glands present in a fibrotic stroma which are highlighted on cytokeratin immunostain. Appropriate  positive and negative controls are examined.      Assessment:     1. Duodenal adenocarcinoma  CBC auto differential    Comprehensive metabolic panel    CT Chest With Contrast   2. Obstructive jaundice due to cancer     3. MDS (myelodysplastic syndrome)     4. Bicytopenia         Recommendations:     1, 2. Duodenal Adenocarcinoma  - newly diagnosed duodenal adenocarinoma with associated duodenal stenosis s/p stent and biliary stricture s/p choledochoduodenostomy and stent placement.   - bilirubin trending downward based on lab work today  - appears to have 2.5 cm x 1.5 cm duodenal mass with endoscopic findings of pancreatic invasion. No reports of abnormal lymphadenopathy in the abdomen.   - CT of Pancreas Protocol consistent with duodenal stenosis and dilatation of intrahepatic and CBD without mention of any clear mass or lymphadenopathy.   - awaiting CT Chest to complete staging.   - based on findings, appears to have Stage IIB - T4 disease, Five-year disease-specific survival is about 48 %.  - ECOG: 3, Albumin: 2.2, Bilirubin: 8.6   - treatment options that could be considered are surgery +/- adjuvant chemotherapy, or if felt to be locally advanced unresectable or metastatic disease who are able to tolerate it, can suggest systemic chemotherapy.   - however, given his age, poor performance status, malnutrition, and significant comorbidites e.g. CAD and MDS, he remains a poor candidate for surgery or chemotherapy unless there is significant improvement in his PS, nutritional status, and bilirubin.   - patient was seen by Dr. Shepherd in Surgical Oncology during this hospital stay and he also felt that patient needed to get stronger before even considering surgery.  - after extensive discussion with patient and family, quality of life is of utmost importance to them and would like to consider supportive care if no significant clinical improvement in the upcoming few weeks. They will follow up with local oncologist, Dr. Morales, in [place] for supportive care  meanwhile.   - will inform Dr. Morales about today's discussion and provide him with all pertinent medical records.      3, 4. MDS  - diagnosed 18 mos ago. Previously on Vidaza at home, last infusion in December.  - counts stable today, no transfusions needed.     Follow up with local oncologist, Dr. Morales in [place].   RTC as PRN    Case discussed with Dr. Trinity Arriaga MD  Hematology/Oncology Fellow      Distress Screening Results: Psychosocial Distress screening score of Distress Score: 2 noted and reviewed. No intervention indicated.